# Patient Record
Sex: FEMALE | Race: WHITE
[De-identification: names, ages, dates, MRNs, and addresses within clinical notes are randomized per-mention and may not be internally consistent; named-entity substitution may affect disease eponyms.]

---

## 2017-02-06 ENCOUNTER — HOSPITAL ENCOUNTER (INPATIENT)
Dept: HOSPITAL 75 - ER | Age: 82
LOS: 4 days | Discharge: NURSING, REHAB OR LONG-TERM CARE | DRG: 690 | End: 2017-02-10
Attending: FAMILY MEDICINE | Admitting: FAMILY MEDICINE
Payer: MEDICARE

## 2017-02-06 VITALS — HEIGHT: 63 IN | WEIGHT: 112.03 LBS | BODY MASS INDEX: 19.85 KG/M2

## 2017-02-06 VITALS — SYSTOLIC BLOOD PRESSURE: 153 MMHG | DIASTOLIC BLOOD PRESSURE: 86 MMHG

## 2017-02-06 VITALS — DIASTOLIC BLOOD PRESSURE: 70 MMHG | SYSTOLIC BLOOD PRESSURE: 125 MMHG

## 2017-02-06 DIAGNOSIS — E83.42: ICD-10-CM

## 2017-02-06 DIAGNOSIS — I25.10: ICD-10-CM

## 2017-02-06 DIAGNOSIS — K21.9: ICD-10-CM

## 2017-02-06 DIAGNOSIS — I10: ICD-10-CM

## 2017-02-06 DIAGNOSIS — E03.9: ICD-10-CM

## 2017-02-06 DIAGNOSIS — Z99.81: ICD-10-CM

## 2017-02-06 DIAGNOSIS — N39.0: Primary | ICD-10-CM

## 2017-02-06 DIAGNOSIS — E87.6: ICD-10-CM

## 2017-02-06 DIAGNOSIS — G47.36: ICD-10-CM

## 2017-02-06 DIAGNOSIS — Z66: ICD-10-CM

## 2017-02-06 DIAGNOSIS — K52.9: ICD-10-CM

## 2017-02-06 DIAGNOSIS — M19.91: ICD-10-CM

## 2017-02-06 DIAGNOSIS — E78.00: ICD-10-CM

## 2017-02-06 DIAGNOSIS — R42: ICD-10-CM

## 2017-02-06 DIAGNOSIS — J44.9: ICD-10-CM

## 2017-02-06 DIAGNOSIS — Z95.5: ICD-10-CM

## 2017-02-06 DIAGNOSIS — Z87.891: ICD-10-CM

## 2017-02-06 LAB
ALBUMIN SERPL-MCNC: 3.1 G/DL (ref 3.2–4.5)
ALT SERPL-CCNC: 10 U/L (ref 0–55)
ANION GAP SERPL CALC-SCNC: 12 MMOL/L (ref 5–14)
AST SERPL-CCNC: 19 U/L (ref 5–34)
BASOPHILS # BLD AUTO: 0 10^3/UL (ref 0–0.1)
BASOPHILS NFR BLD AUTO: 0 % (ref 0–10)
BILIRUB SERPL-MCNC: 0.3 MG/DL (ref 0.1–1)
BILIRUB UR QL STRIP: NEGATIVE
BUN SERPL-MCNC: 14 MG/DL (ref 7–18)
BUN/CREAT SERPL: 22
CALCIUM SERPL-MCNC: 6.7 MG/DL (ref 8.5–10.1)
CHLORIDE SERPL-SCNC: 105 MMOL/L (ref 98–107)
CO2 SERPL-SCNC: 20 MMOL/L (ref 21–32)
CREAT SERPL-MCNC: 0.65 MG/DL (ref 0.6–1.3)
EOSINOPHIL # BLD AUTO: 0 10^3/UL (ref 0–0.3)
EOSINOPHIL NFR BLD AUTO: 0 % (ref 0–10)
ERYTHROCYTE [DISTWIDTH] IN BLOOD BY AUTOMATED COUNT: 13.1 % (ref 10–14.5)
GFR SERPLBLD BASED ON 1.73 SQ M-ARVRAT: > 60 ML/MIN
GLUCOSE SERPL-MCNC: 88 MG/DL (ref 70–105)
HYALINE CASTS #/AREA URNS LPF: (no result) /LPF
KETONES UR QL STRIP: (no result)
LEUKOCYTE ESTERASE UR QL STRIP: NEGATIVE
LYMPHOCYTES # BLD AUTO: 0.7 X 10^3 (ref 1–4)
LYMPHOCYTES NFR BLD AUTO: 13 % (ref 12–44)
MAGNESIUM SERPL-MCNC: 1.3 MG/DL (ref 1.8–2.4)
MCH RBC QN AUTO: 33 PG (ref 25–34)
MCHC RBC AUTO-ENTMCNC: 35 G/DL (ref 32–36)
MCV RBC AUTO: 93 FL (ref 80–99)
MONOCYTES # BLD AUTO: 0.9 X 10^3 (ref 0–1)
MONOCYTES NFR BLD AUTO: 16 % (ref 0–12)
NEUTROPHILS # BLD AUTO: 4 X 10^3 (ref 1.8–7.8)
NEUTROPHILS NFR BLD AUTO: 71 % (ref 42–75)
NITRITE UR QL STRIP: NEGATIVE
PH UR STRIP: 6 [PH] (ref 5–9)
PLATELET # BLD: 158 10^3/UL (ref 130–400)
PMV BLD AUTO: 11.7 FL (ref 7.4–10.4)
POTASSIUM SERPL-SCNC: 2.7 MMOL/L (ref 3.6–5)
PROT SERPL-MCNC: 5.4 G/DL (ref 6.4–8.2)
PROT UR QL STRIP: (no result)
RBC # BLD AUTO: 4.43 10^6/UL (ref 4.35–5.85)
SODIUM SERPL-SCNC: 137 MMOL/L (ref 135–145)
SP GR UR STRIP: 1.02 (ref 1.02–1.02)
TSH SERPL-ACNC: 0.03 UIU/ML (ref 0.35–4.94)
UROBILINOGEN UR-MCNC: NORMAL MG/DL
WBC # BLD AUTO: 5.5 10^3/UL (ref 4.3–11)

## 2017-02-06 PROCEDURE — 96375 TX/PRO/DX INJ NEW DRUG ADDON: CPT

## 2017-02-06 PROCEDURE — 84439 ASSAY OF FREE THYROXINE: CPT

## 2017-02-06 PROCEDURE — 87077 CULTURE AEROBIC IDENTIFY: CPT

## 2017-02-06 PROCEDURE — 85007 BL SMEAR W/DIFF WBC COUNT: CPT

## 2017-02-06 PROCEDURE — 87088 URINE BACTERIA CULTURE: CPT

## 2017-02-06 PROCEDURE — 96365 THER/PROPH/DIAG IV INF INIT: CPT

## 2017-02-06 PROCEDURE — 80053 COMPREHEN METABOLIC PANEL: CPT

## 2017-02-06 PROCEDURE — 81000 URINALYSIS NONAUTO W/SCOPE: CPT

## 2017-02-06 PROCEDURE — 36415 COLL VENOUS BLD VENIPUNCTURE: CPT

## 2017-02-06 PROCEDURE — 82607 VITAMIN B-12: CPT

## 2017-02-06 PROCEDURE — 94760 N-INVAS EAR/PLS OXIMETRY 1: CPT

## 2017-02-06 PROCEDURE — 85027 COMPLETE CBC AUTOMATED: CPT

## 2017-02-06 PROCEDURE — 96361 HYDRATE IV INFUSION ADD-ON: CPT

## 2017-02-06 PROCEDURE — 84443 ASSAY THYROID STIM HORMONE: CPT

## 2017-02-06 PROCEDURE — 80048 BASIC METABOLIC PNL TOTAL CA: CPT

## 2017-02-06 PROCEDURE — 83735 ASSAY OF MAGNESIUM: CPT

## 2017-02-06 PROCEDURE — 85025 COMPLETE CBC W/AUTO DIFF WBC: CPT

## 2017-02-06 PROCEDURE — 94640 AIRWAY INHALATION TREATMENT: CPT

## 2017-02-06 PROCEDURE — 93005 ELECTROCARDIOGRAM TRACING: CPT

## 2017-02-06 RX ADMIN — MAGNESIUM SULFATE IN DEXTROSE SCH MLS/HR: 10 INJECTION, SOLUTION INTRAVENOUS at 19:35

## 2017-02-06 RX ADMIN — MAGNESIUM SULFATE IN DEXTROSE SCH MLS/HR: 10 INJECTION, SOLUTION INTRAVENOUS at 21:29

## 2017-02-06 RX ADMIN — SODIUM CHLORIDE AND POTASSIUM CHLORIDE SCH MLS/HR: 9; 2.98 INJECTION, SOLUTION INTRAVENOUS at 19:35

## 2017-02-06 NOTE — ED GENERAL
General


Chief Complaint:  Abdominal/GI Problems


Stated Complaint:  NAUSEA


Nursing Triage Note:  


pt c/o of nausea and dizziness starting yesterday afternoon. reports nothing to 


eat or drink since then.  pt denies diarrhea, voming, or pain.  denies any 


respiratory symptoms.  brought in per ems


Nursing Sepsis Screen:  No Definite Risk


Source of Information:  Patient, EMS, Old Records


Exam Limitations:  No Limitations





History of Present Illness


Time Seen by Provider:  13:26


Initial Comments


This 85-year-old woman presents to emergency room via EMS with complaints of 

intense nausea and dizziness.  Symptoms have been present since yesterday 

afternoon.  EMS administered Zofran 8 mg but she complains of persistent 

nausea.  She has not been vomiting and denies diarrhea.  Her dizziness is 

exacerbated when standing.  Family reports that she had similar symptoms in the 

past during urinary tract infection.  She is oxygen dependent with COPD.  She 

denies abdominal pain.





Allergies and Home Medications


Allergies


Coded Allergies:  


     prednisone (Verified  Allergy, Unknown, 9/18/15)


     codeine (Verified  Adverse Reaction, Mild, N/V, SWEATS, 9/18/15)


Uncoded Allergies:  


     NARCOTICS (Adverse Reaction, Intermediate, 2/1/13)





Home Medications


Acetaminophen 650 Mg Tablet.er 650 MG PO DAILY PRN PRN PAIN (Reported) 


Albuterol Sulfate 8.5 Gm Hfa.aer.ad 2 PUFF IH Q4H PRN PRN SHORTNESS OF BREATH (

Reported) 


Atorvastatin Calcium 10 Mg Tablet 10 MG PO DAILY (Reported) 


Clopidogrel Bisulfate 75 Mg Tablet 75 MG PO DAILY (Reported) 


Enalapril Maleate 10 Mg Tablet 10 MG PO BID (Reported) 


Levothyroxine Sodium 125 Mcg Tablet 125 MG PO DAILY (Reported) 


Meclizine HCl 25 Mg Tablet 25 MG PO DAILY PRN PRN VERTIGO (Reported) 


Metoprolol Tartrate 25 Mg Tablet 25 MG PO BID (Reported) 


Montelukast Sodium 10 Mg Tablet 10 MG PO HS (Reported) 


Pantoprazole Sodium 40 Mg Tablet.dr 40 MG PO DAILY (Reported) 


Tolterodine Tartrate 1 Mg Tablet 1 MG PO BID (Reported) 


Trazodone HCl 50 Mg Tablet 50 MG PO HS PRN PRN SLEEP (Reported) 


Verapamil HCl 240 Mg Tablet.er 240 MG PO DAILY (Reported) 


Vit A/C/E/Zinc/Co 1 Cap Capsule 1 CAP PO BID (Reported) 





Constitutional:   no symptoms reported


EENTM:   no symptoms reported


Respiratory:   no symptoms reported


Cardiovascular:   no symptoms reported


Gastrointestinal:   see HPI


Genitourinary:   no symptoms reported


Musculoskeletal:   no symptoms reported


Skin:   no symptoms reported


Psychiatric/Neurological:   See HPI


Hematologic/Lymphatic:   No Symptoms Reported





Past Medical-Social-Family Hx


Patient Social History


Alcohol Use:  Occasionally Uses


Recreational Drug Use:  No


Smoking Status:  Former Smoker


Former Smoker/When Quit:  Jan 1, 1999


2nd Hand Smoke Exposure:  No


Recent Foreign Travel:  No


Contact w/Someone Who Travel:  No


Recent Infectious Disease Expo:  No


Recent Hopitalizations:  No





Immunizations Up To Date


Tetanus Booster (TDap):  Unknown


Date of Pneumonia Vaccine:  Oct 1, 2009


Date of Influenza Vaccine:  Oct 15, 2016





Seasonal Allergies


Seasonal Allergies:  No





Surgeries


HX Surgeries:  Yes (tumor removed high in the ear, hip)


Surgeries:  Coronary Stent, Eye Surgery, Gallbladder, Orthopedic, Tonsillectomy





Respiratory


Hx Respiratory Disorders:  Yes


Respiratory Disorders:  Pneumonia, COPD, Emphysema





Cardiovascular


Hx Cardiac Disorders:  Yes (CARDIAC STENT X1)


Cardiac Disorders:  Coronary Artery Disease, High Cholesterol, Hypertension, 

Valvular Heart Disease





Neurological


Hx Neurological Disorders:  Yes (chronic dizziness)





Reproductive System


Pregnant:  No


Hx Reproductive Disorders:  No





Genitourinary


Hx Genitourinary Disorders:  Yes


Genitourinary Disorders:  Kidney Infection, UTI-Chronic





Gastrointestinal


Hx Gastrointestinal Disorders:  Yes


Gastrointestinal Disorders:  Colitis, Gastroesophageal Reflux





Musculoskeletal


Hx Musculoskeletal Disorders:  Yes (LEFT HIP FX-2/1/2013, compression fractures)


Musculoskeletal Disorders:  Arthritis, Fractures





Endocrine


Hx Endocrine Disorders:  Yes


Endocrine Disorders:  Hypothyroidsim





HEENT


HX ENT Disorders:  Yes


HEENT Disorders:  Cataract





Cancer


Hx Cancer:  No





Psychosocial


Hx Psychiatric Problems:  No





Integumentary


HX Skin/Integumentary Disorder:  No





Blood Transfusions


Hx Blood Disorders:  No





Family Medical History


Significant Family History:  Cancer, Hypertension


Family Medial History:  


Neoplasm


  G8 SISTER





Physical Exam


Vital Signs





 Vital Sign - Last 12Hours








 2/6/17





 13:39


 


Temp 97.7


 


Pulse 75


 


Resp 18


 


B/P 175/83


 


Pulse Ox 88


 


O2 Delivery Room Air


 


O2 Flow Rate 3


 


FiO2 92





Capillary Refill : Less Than 3 Seconds


General Appearance:   WD/WN Mild Distress


HEENT:   PERRL/EOMI Normal ENT Inspection Pharynx Normal


Respiratory:   Lungs Clear No Accessory Muscle Use No Respiratory Distress 

Decreased Breath Sounds


Cardiovascular:   Regular Rate, Rhythm No Edema No Murmur


Gastrointestinal:   Normal Bowel Sounds Non Tender Soft


Extremity:   Normal Inspection No Pedal Edema


Neurologic/Psychiatric:   Alert Oriented x3 No Motor/Sensory Deficits Normal 

Mood/Affect CNs II-XII Norm as Tested Other (exam normal to the extent tested.  

Patient was too uncomfortable dizziness for extensive exam)


Skin:   Normal Color Warm/Dry





Progress/Results/Core Measures


Results/Orders


Lab Results





 Laboratory Tests








Test


  2/6/17


13:54 2/6/17


15:03 Range/Units


 


 


Alanine Aminotransferase


(ALT/SGPT) 10 


  


  0-55  U/L


 


 


Albumin 3.1 L  3.2-4.5  G/DL


 


Alkaline Phosphatase 64     U/L


 


Anion Gap 12   5-14  MMOL/L


 


Aspartate Amino Transf


(AST/SGOT) 19 


  


  5-34  U/L


 


 


BUN/Creatinine Ratio 22    


 


Basophils # (Auto)


  0.0 


  


  0.0-0.1


10^3/uL


 


Basophils (%) (Auto) 0   0-10  %


 


Blood Urea Nitrogen 14   7-18  MG/DL


 


Calcium Level 6.7 L  8.5-10.1  MG/DL


 


Carbon Dioxide Level 20 L  21-32  MMOL/L


 


Chloride Level 105     MMOL/L


 


Creatinine


  0.65 


  


  0.60-1.30


MG/DL


 


Eosinophils # (Auto)


  0.0 


  


  0.0-0.3


10^3/uL


 


Eosinophils (%) (Auto) 0   0-10  %


 


Estimat Glomerular Filtration


Rate > 60 


  


   


 


 


Free Thyroxine


  1.49 H


  


  0.70-1.48


NG/DL


 


Glucose Level 88     MG/DL


 


Hematocrit 41   35-52  %


 


Hemoglobin 14.4   11.5-16.0  G/DL


 


Lymphocytes # (Auto) 0.7 L  1.0-4.0  X 10^3


 


Lymphocytes (%) (Auto) 13   12-44  %


 


Magnesium Level 1.3 L  1.8-2.4  MG/DL


 


Mean Corpuscular Hemoglobin 33   25-34  PG


 


Mean Corpuscular Hemoglobin


Concent 35 


  


  32-36  G/DL


 


 


Mean Corpuscular Volume 93   80-99  FL


 


Mean Platelet Volume 11.7 H  7.4-10.4  FL


 


Monocytes # (Auto) 0.9   0.0-1.0  X 10^3


 


Monocytes (%) (Auto) 16 H  0-12  %


 


Neutrophils # (Auto) 4.0   1.8-7.8  X 10^3


 


Neutrophils (%) (Auto) 71   42-75  %


 


Platelet Count


  158 


  


  130-400


10^3/uL


 


Potassium Level 2.7 L  3.6-5.0  MMOL/L


 


Red Blood Count


  4.43 


  


  4.35-5.85


10^6/uL


 


Red Cell Distribution Width 13.1   10.0-14.5  %


 


Sodium Level 137   135-145  MMOL/L


 


Thyroid Stimulating Hormone


(TSH) 0.03 L


  


  0.35-4.94


UIU/ML


 


Total Bilirubin 0.3   0.1-1.0  MG/DL


 


Total Protein 5.4 L  6.4-8.2  G/DL


 


White Blood Count


  5.5 


  


  4.3-11.0


10^3/uL


 


Urine Bacteria  FEW H  /HPF


 


Urine Bilirubin  NEGATIVE  NEGATIVE  


 


Urine Casts  PRESENT   /LPF


 


Urine Clarity


  


  SLIGHTLY


CLOUDY  


 


 


Urine Color  YELLOW   


 


Urine Crystals  NONE   /LPF


 


Urine Culture Indicated  YES   


 


Urine Glucose (UA)  NEGATIVE  NEGATIVE  


 


Urine Hyaline Casts  25-50 H  /LPF


 


Urine Ketones  4+ H NEGATIVE  


 


Urine Leukocyte Esterase  NEGATIVE  NEGATIVE  


 


Urine Mucus  MODERATE H  /LPF


 


Urine Nitrite  NEGATIVE  NEGATIVE  


 


Urine Protein  3+ H NEGATIVE  


 


Urine RBC  2-5 H  /HPF


 


Urine RBC (Auto)  3+ H NEGATIVE  


 


Urine Specific Gravity  1.020  1.016-1.022  


 


Urine Squamous Epithelial


Cells 


  10-25 H


   /HPF


 


 


Urine Urobilinogen  NORMAL  NORMAL  MG/DL


 


Urine WBC  10-25 H  /HPF


 


Urine pH  6  5-9  








My Orders





 Orders-MALKA CABELLO MD


Promethazine Injection (Phenergan Injec (2/6/17 14:00)


Cbc With Automated Diff (2/6/17 13:48)


Comprehensive Metabolic Panel (2/6/17 13:48)


Magnesium (2/6/17 13:48)


Ua Culture If Indicated (2/6/17 13:48)


Saline Lock/Iv-Start (2/6/17 13:48)


Potassium Cl 10meq/50ml Ivpb (Kcl 10 Meq (2/6/17 15:00)


Thyroid Stimulating Hormone (2/6/17 15:20)


Free T4 (Free Thyroxine) (2/6/17 15:20)


Ns Iv 500 Ml (Sodium Chloride 0.9%) (2/6/17 15:49)


Urine Culture (2/6/17 15:03)


Ceftriaxone Injection (Rocephin Injectio (2/6/17 16:00)


Ekg Tracing (2/6/17 16:17)


O2 (2/6/17 16:17)





Medications Given in ED





Vital Signs/I&O





 Vital Sign - Last 12Hours








 2/6/17 2/6/17 2/6/17 2/6/17





 17:45 20:14 21:00 21:08


 


Temp  98.9  


 


Pulse  87  80


 


Resp  18  


 


B/P  125/70  


 


Pulse Ox  90 92 


 


O2 Delivery Nasal Cannula Nasal Cannula Nasal Cannula 


 


O2 Flow Rate 2.00 2.00 2.00 


 


    





 2/7/17 2/7/17 2/7/17 2/7/17





 00:00 00:13 00:16 01:00


 


Temp 99.7   


 


Pulse 89   79


 


Resp 24   


 


B/P 155/89   


 


Pulse Ox 93 93 93 


 


O2 Delivery Nasal Cannula   


 


O2 Flow Rate 2.00  3.00 


 


    





 2/7/17   





 03:45   


 


Temp 99.8   


 


Pulse 85   


 


Resp 22   


 


B/P 135/61   


 


Pulse Ox 95   


 


O2 Delivery Nasal Cannula   


 


O2 Flow Rate 2.00   








Blood Pressure Mean:  113


Progress Note #1:  


   Time:  15:18


Progress Note


Severe hypokalemia was noted on review of labs.  A dose of IV potassium has 

been ordered.  This has been ordered without additional fluids so as to avoid a 

delusional effect since patient already received 1 L of IV fluids.  Patient's 

nausea and dizziness are much improved after administration of Phenergan and 

fluids.  Patient will require admission for treatment of electrolyte imbalances.


Progress Note #2:  


   Time:  16:14


Progress Note


UTI was suspected based on UA results.  A gram of Rocephin was administered.





ECG


Initial ECG Impression Date:  Feb 6, 2017


Initial ECG Impression Time:  16:46


Initial ECG Rate:  83


Initial ECG Rhythm:  Normal Sinus


Initial ECG Intervals


Prolonged QT interval


Comment


Sinus rhythm with no ST elevation or depression.  Prolonged QT interval.  No 

axis deviation.





Departure


Communication


Time/Spoke to Admitting Phy:  16:00


Communication


Case reviewed with Dr. Hernández is agreeable to admission with electrolyte 

replacement.  Phenergan and Zofran will be continued for control of nausea and 

dizziness.





Impression


Impression:  


 Primary Impression:  


 Hypokalemia


 Additional Impressions:  


 Hypomagnesemia


 Urinary tract infection


 Qualified Code:  N39.0 - Urinary tract infection, site not specified


 Nausea


 Dizziness


 Thyroid dysfunction


Disposition:  09 ADMITTED AS INPATIENT


Condition:  Improved


Decision to Admit Reason:  Admit from ER (General)


Decision to Admit/Date:  Feb 6, 2017


Time/Decision to Admit Time:  15:30





Departure-Patient Inst.


Referrals:  


MOSES HERNÁNDEZ MD (PCP/Family)


Primary Care Physician








MALKA CABELLO MD Feb 6, 2017 15:20

## 2017-02-07 VITALS — SYSTOLIC BLOOD PRESSURE: 129 MMHG | DIASTOLIC BLOOD PRESSURE: 58 MMHG

## 2017-02-07 VITALS — DIASTOLIC BLOOD PRESSURE: 89 MMHG | SYSTOLIC BLOOD PRESSURE: 155 MMHG

## 2017-02-07 VITALS — SYSTOLIC BLOOD PRESSURE: 135 MMHG | DIASTOLIC BLOOD PRESSURE: 61 MMHG

## 2017-02-07 VITALS — SYSTOLIC BLOOD PRESSURE: 110 MMHG | DIASTOLIC BLOOD PRESSURE: 57 MMHG

## 2017-02-07 VITALS — SYSTOLIC BLOOD PRESSURE: 123 MMHG | DIASTOLIC BLOOD PRESSURE: 87 MMHG

## 2017-02-07 VITALS — DIASTOLIC BLOOD PRESSURE: 68 MMHG | SYSTOLIC BLOOD PRESSURE: 128 MMHG

## 2017-02-07 LAB
ANION GAP SERPL CALC-SCNC: 9 MMOL/L (ref 5–14)
BASOPHILS # BLD AUTO: 0 10^3/UL (ref 0–0.1)
BASOPHILS NFR BLD AUTO: 0 % (ref 0–10)
BASOPHILS NFR BLD MANUAL: 0 %
BUN SERPL-MCNC: 16 MG/DL (ref 7–18)
BUN/CREAT SERPL: 19
CALCIUM SERPL-MCNC: 8.1 MG/DL (ref 8.5–10.1)
CHLORIDE SERPL-SCNC: 103 MMOL/L (ref 98–107)
CO2 SERPL-SCNC: 23 MMOL/L (ref 21–32)
CREAT SERPL-MCNC: 0.84 MG/DL (ref 0.6–1.3)
EOSINOPHIL # BLD AUTO: 0 10^3/UL (ref 0–0.3)
EOSINOPHIL NFR BLD AUTO: 0 % (ref 0–10)
EOSINOPHIL NFR BLD MANUAL: 0 %
ERYTHROCYTE [DISTWIDTH] IN BLOOD BY AUTOMATED COUNT: 13.5 % (ref 10–14.5)
GFR SERPLBLD BASED ON 1.73 SQ M-ARVRAT: > 60 ML/MIN
GLUCOSE SERPL-MCNC: 95 MG/DL (ref 70–105)
LYMPHOCYTES # BLD AUTO: 0.8 X 10^3 (ref 1–4)
LYMPHOCYTES NFR BLD AUTO: 17 % (ref 12–44)
MAGNESIUM SERPL-MCNC: 2.1 MG/DL (ref 1.8–2.4)
MCH RBC QN AUTO: 32 PG (ref 25–34)
MCHC RBC AUTO-ENTMCNC: 34 G/DL (ref 32–36)
MCV RBC AUTO: 95 FL (ref 80–99)
MONOCYTES # BLD AUTO: 0.9 X 10^3 (ref 0–1)
MONOCYTES NFR BLD AUTO: 19 % (ref 0–12)
NEUTROPHILS # BLD AUTO: 2.9 X 10^3 (ref 1.8–7.8)
NEUTROPHILS NFR BLD AUTO: 64 % (ref 42–75)
NEUTS BAND NFR BLD MANUAL: 77 %
NEUTS BAND NFR BLD: 0 %
PLATELET # BLD: 154 10^3/UL (ref 130–400)
PMV BLD AUTO: 11.7 FL (ref 7.4–10.4)
POTASSIUM SERPL-SCNC: 3.7 MMOL/L (ref 3.6–5)
RBC # BLD AUTO: 4.26 10^6/UL (ref 4.35–5.85)
SODIUM SERPL-SCNC: 135 MMOL/L (ref 135–145)
VARIANT LYMPHS NFR BLD MANUAL: 6 %
VARIANT LYMPHS NFR BLD MANUAL: 7 %
WBC # BLD AUTO: 4.5 10^3/UL (ref 4.3–11)

## 2017-02-07 RX ADMIN — IPRATROPIUM BROMIDE AND ALBUTEROL SULFATE SCH ML: .5; 3 SOLUTION RESPIRATORY (INHALATION) at 10:31

## 2017-02-07 RX ADMIN — Medication PRN ML: at 20:20

## 2017-02-07 RX ADMIN — ACETAMINOPHEN PRN MG: 500 TABLET ORAL at 18:20

## 2017-02-07 RX ADMIN — METOPROLOL TARTRATE SCH MG: 25 TABLET, FILM COATED ORAL at 09:45

## 2017-02-07 RX ADMIN — ACETAMINOPHEN PRN MG: 500 TABLET ORAL at 09:44

## 2017-02-07 RX ADMIN — IPRATROPIUM BROMIDE AND ALBUTEROL SULFATE SCH ML: .5; 3 SOLUTION RESPIRATORY (INHALATION) at 19:17

## 2017-02-07 RX ADMIN — ACETAMINOPHEN PRN MG: 500 TABLET ORAL at 17:28

## 2017-02-07 RX ADMIN — PROMETHAZINE HYDROCHLORIDE PRN MG: 25 INJECTION INTRAMUSCULAR; INTRAVENOUS at 20:19

## 2017-02-07 RX ADMIN — CLOPIDOGREL BISULFATE SCH MG: 75 TABLET, FILM COATED ORAL at 09:47

## 2017-02-07 RX ADMIN — IPRATROPIUM BROMIDE AND ALBUTEROL SULFATE SCH ML: .5; 3 SOLUTION RESPIRATORY (INHALATION) at 14:03

## 2017-02-07 RX ADMIN — SODIUM CHLORIDE AND POTASSIUM CHLORIDE SCH MLS/HR: 9; 2.98 INJECTION, SOLUTION INTRAVENOUS at 08:05

## 2017-02-07 RX ADMIN — METOPROLOL TARTRATE SCH MG: 25 TABLET, FILM COATED ORAL at 20:20

## 2017-02-07 RX ADMIN — ENALAPRIL MALEATE SCH MG: 10 TABLET ORAL at 20:20

## 2017-02-07 RX ADMIN — ENALAPRIL MALEATE SCH MG: 10 TABLET ORAL at 09:44

## 2017-02-07 RX ADMIN — ONDANSETRON PRN MG: 2 INJECTION, SOLUTION INTRAMUSCULAR; INTRAVENOUS at 16:40

## 2017-02-07 RX ADMIN — IPRATROPIUM BROMIDE AND ALBUTEROL SULFATE SCH ML: .5; 3 SOLUTION RESPIRATORY (INHALATION) at 07:36

## 2017-02-07 RX ADMIN — ONDANSETRON PRN MG: 2 INJECTION, SOLUTION INTRAMUSCULAR; INTRAVENOUS at 09:45

## 2017-02-07 RX ADMIN — PANTOPRAZOLE SODIUM SCH MG: 40 TABLET, DELAYED RELEASE ORAL at 09:44

## 2017-02-07 RX ADMIN — ATORVASTATIN CALCIUM SCH MG: 10 TABLET, FILM COATED ORAL at 09:44

## 2017-02-07 RX ADMIN — TOLTERODINE TARTRATE SCH MG: 2 CAPSULE, EXTENDED RELEASE ORAL at 09:54

## 2017-02-07 RX ADMIN — MONTELUKAST SCH MG: 10 TABLET, FILM COATED ORAL at 20:20

## 2017-02-07 RX ADMIN — SODIUM CHLORIDE AND POTASSIUM CHLORIDE SCH MLS/HR: 9; 2.98 INJECTION, SOLUTION INTRAVENOUS at 04:15

## 2017-02-07 RX ADMIN — SODIUM CHLORIDE AND POTASSIUM CHLORIDE SCH MLS/HR: 9; 2.98 INJECTION, SOLUTION INTRAVENOUS at 19:15

## 2017-02-07 RX ADMIN — SODIUM CHLORIDE SCH MLS/HR: 900 INJECTION INTRAVENOUS at 08:04

## 2017-02-07 RX ADMIN — VERAPAMIL HYDROCHLORIDE SCH MG: 240 TABLET, FILM COATED, EXTENDED RELEASE ORAL at 09:54

## 2017-02-07 NOTE — PHYSICAL THERAPY EVALUATION
PT Evaluation-General


Medical Diagnosis


Admission Date


2017 at 16:31


Medical Diagnosis:  UTI/hypokalemia


Onset Date:  2017





Therapy Diagnosis


Therapy Diagnosis:  debility/weakness





Height/Weight


Height (Feet):  5


Height (Inches):  3.00


Weight (Pounds):  112


Weight (Ounces):  0.5





Precautions


Precautions/Isolations:  Standard Precautions





Referral


Physician:  Edgar


Reason for Referral:  Evaluation/Treatment





Medical History


Pertinent Medical History:  Arthritis, CAD, COPD, GERD, HTN, Hypothroidism


Additional Medical History


O2 dependent


Current History


ER via family with c/o nausea and dizziness


Reviewed History:  Yes





Social History


Home:  Apartment


Current Living Status:  Alone


Entry Into Home:  Level Entry





Prior/Core FIM


Prior Level of Function


              Functional [place] Measure


0=Not Assessed/NA   4=Minimal Assistance


1=Total Assistance   5=Supervision or Setup


2=Maximal Assistance   6=Modified [place]


3=Moderate Assistance   7=Complete [place]


Bed Mobility:  6


Transfers (B,C,W/C) (FIM):  6


Gait:  6


FWW at home





PT Evaluation-Current


Subjective


Patient initially declined PT, however, after much encouragement, patient 

agreed.





Pain





   Numeric Pain Scale:  0-No Pain


   Location:  No Pain Reported





Objective


Patient Orientation:  Normal For Age


Problem Solving:  Fair


Attachments:  Oxygen, IV





ROM/Strength


ROM Lower Extremities


bilateral LE WFL


Strenght Lower Extremities


bilateral LE WFL





Integumentary/Posture


Integumentary


refer to nursing notes


Bowel Incontinence:  No


Bladder Incontinence:  No


Posture


WNL





Neuromuscular


(Tone, Coordination, Reflexes)


grossly intact





Sensory


Hearing:  Functional


Sensation Right Lower Extremit:  Intact


Sensation Left Lower Extremity:  Intact





Transfers


              Functional [place] Measure


0=Not Assessed/NA   4=Minimal Assistance


1=Total Assistance   5=Supervision or Setup


2=Maximal Assistance   6=Modified [place]


3=Moderate Assistance   7=Complete [place]


Transfers (B, C, W/C) (FIM):  5


Scootin


Rollin


Supine to/from Sit:  5





Gait


Mode of Locomotion:  Walk


Anticipated Mode of Locomotion:  Walk


Gait (FIM):  1


Distance (FIM):  1=up to 49 ft


Distance:  5'


Gait Level of Assist:  5


Gait Persons Needed:  1


Gait Assistive Device:  FWW


Comments/Gait Description


functional; patient declined to ambulate farther distance at this time





Balance


Sitting Static:  Normal


Sitting Dynamic:  Normal


Standing Static:  Normal


 Standing Dynamic:  Normal





Assessment/Needs


85 y.o. female, will benefit from short term skilled PT to address functional 

mobility to improve performance and to return safely to home at [facility].


Rehab Potential:  Good





PT Long Term Goals


Long Term Goals


PT Long Term Goals Time Frame:  2017


Transfers (B,C,W/C) (FIM):  6


Gait (FIM):  6


Gait distance (FIM):  3=150 ft


Distance:  150'


Gait Level of Assist:  6


Gait Assistive Device:  FWW





PT Plan


Problem List


Problem List:  Activity Tolerance, Functional Strength





Treatment/Plan


Treatment Plan:  Continue Plan of Care


Treatment Plan:  Education, Functional Activity Atif, Functional Strength, Gait

, Safety, Therapeutic Exercise, Transfers


Treatment Duration:  2017


# of days/week


5-6


Visits Per Week:  5-6


Pt/Family Agrees w/Plan:  Yes





Safety Risks/Education


Patient Education:  Disease Process, Safety Issues


Teaching Recipient:  Patient


Teaching Methods:  Discussion


Response to Teaching:  Verbalize Understanding





Time/GCodes


Time In:  1455


Time Out:  1510


Total Billed Treatment Time:  15


Total Billed Treatment


1 visit


EVLowC 15 min








KAVITA PERDOMO PT 2017 15:19

## 2017-02-07 NOTE — HISTORY & PHYSICIAL
History of Present Illness


History of Present Illness


Reason for visit/HPI


PT IS AN 86 Y/O FEMALE WHO IS NEW TO ME, PER EMERGENCY DEPT PHYSICIAN, PT 

PLANNING TO TRANSFER TO MY CARE, HAS NOT YET ESTABLISHED WITH ME AS A PATIENT.


THE PATIENT REPORTEDLY HAD SIGNIFICANT DIZZINESS, SOME NAUSEA WITHOUT EMESIS, 

AND CONFUSION.


PT WAS BROUGHT TO HOSPITAL BY FAMILY, FOUND TO HAVE PROFOUND HYPOKALEMIA AND 

URINARY TRACT INFECTION.  PT WAS ADMITTED FROM THE EMERGENCY DEPT FOR IV FLUIDS 

AND IV ANTIBIOTICS.


Date of Admission


2017 at 16:31


I consulted on this patient on


17


 08:12


Attending Physician


Moses Hernández MD


Admitting Physician


Moses Hernández MD


Consult








Allergies and Home Medications


Allergies


Coded Allergies:  


     prednisone (Verified  Allergy, Unknown, 9/18/15)


     codeine (Verified  Adverse Reaction, Mild, N/V, SWEATS, 9/18/15)


Uncoded Allergies:  


     NARCOTICS (Adverse Reaction, Intermediate, 13)





Home Medications


Acetaminophen 650 Mg Tablet.er 650 MG PO DAILY PRN PRN PAIN (Reported) 


Albuterol Sulfate 8.5 Gm Hfa.aer.ad 2 PUFF IH Q4H PRN PRN SHORTNESS OF BREATH (

Reported) 


Atorvastatin Calcium 10 Mg Tablet 10 MG PO DAILY (Reported) 


Clopidogrel Bisulfate 75 Mg Tablet 75 MG PO DAILY (Reported) 


Enalapril Maleate 10 Mg Tablet 10 MG PO BID (Reported) 


Levothyroxine Sodium 125 Mcg Tablet 125 MG PO DAILY (Reported) 


Meclizine HCl 25 Mg Tablet 25 MG PO DAILY PRN PRN VERTIGO (Reported) 


Metoprolol Tartrate 25 Mg Tablet 25 MG PO BID (Reported) 


Montelukast Sodium 10 Mg Tablet 10 MG PO HS (Reported) 


Pantoprazole Sodium 40 Mg Tablet.dr 40 MG PO DAILY (Reported) 


Tolterodine Tartrate 1 Mg Tablet 1 MG PO BID (Reported) 


Trazodone HCl 50 Mg Tablet 50 MG PO HS PRN PRN SLEEP (Reported) 


Verapamil HCl 240 Mg Tablet.er 240 MG PO DAILY (Reported) 


Vit A/C/E/Zinc/Co 1 Cap Capsule 1 CAP PO BID (Reported) 





Past Medical-Social-Family Hx


Patient Social History


Living Status:  LIVES AT HOME ALONE


Employed/Student:  retired


Alcohol Use:  Occasionally Uses


Recreational Drug Use:  No


Smoking Status:  Former Smoker


Former smoker/When Quit:  1999


2nd Hand Smoke Exposure:  No


Physical Abuse Screen:  No


Sexual Abuse:  No


Recent Foreign Travel:  No


Contact w/other who traveled:  No


Recent Hopitalizations:  No


Recent Infectious Disease Expo:  No





Immunizations Up To Date


Tetanus Booster (TDap):  Unknown


Date of Pneumonia Vaccine:  Oct 1, 2009


Date of Influenza Vaccine:  Oct 15, 2016





Seasonal Allergies


Seasonal Allergies:  No





Surgeries


HX Surgeries:  Yes (tumor removed high in the ear, hip)


Surgeries:  Coronary Stent, Eye Surgery, Gallbladder, Orthopedic, Tonsillectomy





Respiratory


Hx Respiratory Disorders:  Yes





Cardiovascular


Hx Cardiovascular Disorders:  Yes (CARDIAC STENT X1)


Cardiac Disorders:  Coronary Artery Disease, High Cholesterol, Hypertension, 

Valvular Heart Disease





Neurological


Hx Neurological Disorders:  Yes (chronic dizziness)





Reproductive System


Pregnant:  No


Hx Reproductive Disorders:  No


Sexually Transmitted Disease:  No


HIV/AIDS:  No


Female Reproductive Disorders:  Denies





Genitourinary


Hx Genitourinary Disorders:  Yes


Genitourinary Disorders:  Kidney Infection, UTI-Chronic





Gastrointestinal


Hx Gastrointestinal Disorders:  Yes


Gastrointestinal Disorders:  Colitis, Gastroesophageal Reflux





Musculoskeletal


Hx Musculoskeletal Disorders:  Yes (LEFT HIP FX-2013, compression fractures)


Musculoskeletal Disorders:  Arthritis, Fractures





Endocrine


Hx Endocrine Disorders:  Yes


Endocrine Disorders:  Hypothyroidsim





HEENT


HX ENT Disorders:  Yes


HEENT Disorders:  Cataract





Cancer


Hx Cancer:  No





Psychosocial


Hx Psychiatric Problems:  No





Integumentary


HX Skin/Integumentary Disorder:  No





Blood Transfusions


Hx Blood Disorders:  No





Reviewed Nursing Assessment


Reviewed/Agree w Nursing PMH:  Yes





Family Medical History


Significant Family History:  Cancer, Hypertension


Family Hx:  


Neoplasm


  G8 SISTER





Constitutional:   fever malaise weaknessNo weight loss


EENTM:  No hoarseness, No mouth pain, No throat pain


Respiratory:   short of breath


Cardiovascular:  No chest pain, No edema


Gastrointestinal:   diarrhea nausea


Genitourinary:   frequency


Musculoskeletal:   back pain muscle weakness


Skin:  No lesions, No rash


Psychiatric/Neurological:   AnxietyDenies Depressed


All Other Systems Reviewed


Negative Unless Noted:  Yes





Physical Exam


Vital Signs





 Vital Sign - Last 12Hours








 17





 13:39


 


Temp 97.7


 


Pulse 75


 


Resp 18


 


B/P 175/83


 


Pulse Ox 88


 


O2 Delivery Room Air


 


O2 Flow Rate 3


 


FiO2 92





Capillary Refill : Less Than 3 Seconds


General Appearance:   WD/WN Mild Distress


Eyes:  Bilateral Eye EOMI, Bilateral Eye Normal Inspection, Bilateral Eye PERRL


HEENT:   PERRL/EOMI Pharynx Normal


Neck:   Full Range of Motion Supple


Respiratory:   Chest Non Tender Decreased Breath Sounds


Cardiovascular:   Regular Rate, Rhythm


Gastrointestinal:   Non Tender Soft Abnormal Bowel Sounds (INCREASED)No 

Distended


Rectal:   Deferred


Back:   Normal Inspection


Extremity:   Normal Capillary Refill No Pedal Edema


Neurologic/Psychiatric:   Alert Oriented x3 Other (FLAT AFFECT)


Skin:   Normal Color Warm/Dry


Lymphatic:   No Adenopathy





Assessment/Plan


Assessment and Plan


ABDOMINAL PAIN


DIARRHEA


NAUSEA


UTI


HYPOKALEMIA


HYPOMAGNESEMIA


WEAKNESS





ABDOMINAL PAIN - DIARRHEA - NAUSEA - REPLENISH WITH IV FLUIDS - MONITOR SYMPTOMS

, SUSPECT PT HAS CURRENT GI ILLNESS CAUSING GASTROENTERITIS, WILL MONITOR 

SYMPTOMS.





UTI - MONITOR CULTURE AND SENSITIVITY





HYPOKALEMIA AND HYPOMAGNESEMIA - REPLENISH WITH IV FLUIDS AND IV ELECTROLYTES





WEAKNESS - START THERAPY 


PT MAY NEED NURSING HOME FOR REHAB





Admission Diagnosis


ABDOMINAL PAIN


DIARRHEA


UTI


NAUSEA


HYPOKALEMIA


HYPOMAGNESEMIA


WEAKNESS





Clinical Quality Measures


DVT/VTE Risk/Contraindication:


Risk Factor Score Per Nursin


RFS Level Per Nursing on Admit:  2=Moderate








MOSES HERNÁNDEZ MD 2017 08:16

## 2017-02-08 VITALS — SYSTOLIC BLOOD PRESSURE: 90 MMHG | DIASTOLIC BLOOD PRESSURE: 57 MMHG

## 2017-02-08 VITALS — DIASTOLIC BLOOD PRESSURE: 76 MMHG | SYSTOLIC BLOOD PRESSURE: 153 MMHG

## 2017-02-08 VITALS — DIASTOLIC BLOOD PRESSURE: 56 MMHG | SYSTOLIC BLOOD PRESSURE: 124 MMHG

## 2017-02-08 VITALS — SYSTOLIC BLOOD PRESSURE: 137 MMHG | DIASTOLIC BLOOD PRESSURE: 83 MMHG

## 2017-02-08 VITALS — SYSTOLIC BLOOD PRESSURE: 95 MMHG | DIASTOLIC BLOOD PRESSURE: 51 MMHG

## 2017-02-08 RX ADMIN — Medication SCH EA: at 06:13

## 2017-02-08 RX ADMIN — ENALAPRIL MALEATE SCH MG: 10 TABLET ORAL at 08:46

## 2017-02-08 RX ADMIN — IPRATROPIUM BROMIDE AND ALBUTEROL SULFATE SCH ML: .5; 3 SOLUTION RESPIRATORY (INHALATION) at 11:00

## 2017-02-08 RX ADMIN — PROMETHAZINE HYDROCHLORIDE PRN MG: 25 INJECTION INTRAMUSCULAR; INTRAVENOUS at 20:15

## 2017-02-08 RX ADMIN — SODIUM CHLORIDE AND POTASSIUM CHLORIDE SCH MLS/HR: 9; 2.98 INJECTION, SOLUTION INTRAVENOUS at 18:48

## 2017-02-08 RX ADMIN — METOPROLOL TARTRATE SCH MG: 25 TABLET, FILM COATED ORAL at 20:15

## 2017-02-08 RX ADMIN — METRONIDAZOLE SCH MLS/HR: 5 INJECTION, SOLUTION INTRAVENOUS at 22:07

## 2017-02-08 RX ADMIN — SODIUM CHLORIDE AND POTASSIUM CHLORIDE SCH MLS/HR: 9; 2.98 INJECTION, SOLUTION INTRAVENOUS at 07:47

## 2017-02-08 RX ADMIN — VERAPAMIL HYDROCHLORIDE SCH MG: 240 TABLET, FILM COATED, EXTENDED RELEASE ORAL at 08:45

## 2017-02-08 RX ADMIN — SODIUM CHLORIDE SCH MLS/HR: 900 INJECTION INTRAVENOUS at 08:45

## 2017-02-08 RX ADMIN — ACETAMINOPHEN PRN MG: 500 TABLET ORAL at 13:33

## 2017-02-08 RX ADMIN — ATORVASTATIN CALCIUM SCH MG: 10 TABLET, FILM COATED ORAL at 08:45

## 2017-02-08 RX ADMIN — PANTOPRAZOLE SODIUM SCH MG: 40 TABLET, DELAYED RELEASE ORAL at 08:46

## 2017-02-08 RX ADMIN — IPRATROPIUM BROMIDE AND ALBUTEROL SULFATE SCH ML: .5; 3 SOLUTION RESPIRATORY (INHALATION) at 07:22

## 2017-02-08 RX ADMIN — ENOXAPARIN SODIUM SCH MG: 100 INJECTION SUBCUTANEOUS at 14:17

## 2017-02-08 RX ADMIN — IPRATROPIUM BROMIDE AND ALBUTEROL SULFATE SCH ML: .5; 3 SOLUTION RESPIRATORY (INHALATION) at 19:43

## 2017-02-08 RX ADMIN — ENALAPRIL MALEATE SCH MG: 10 TABLET ORAL at 20:15

## 2017-02-08 RX ADMIN — MONTELUKAST SCH MG: 10 TABLET, FILM COATED ORAL at 20:15

## 2017-02-08 RX ADMIN — METOPROLOL TARTRATE SCH MG: 25 TABLET, FILM COATED ORAL at 08:46

## 2017-02-08 RX ADMIN — CLOPIDOGREL BISULFATE SCH MG: 75 TABLET, FILM COATED ORAL at 08:46

## 2017-02-08 RX ADMIN — IPRATROPIUM BROMIDE AND ALBUTEROL SULFATE SCH ML: .5; 3 SOLUTION RESPIRATORY (INHALATION) at 14:36

## 2017-02-08 RX ADMIN — METRONIDAZOLE SCH MLS/HR: 5 INJECTION, SOLUTION INTRAVENOUS at 14:17

## 2017-02-08 RX ADMIN — LACTOBACILLUS TAB SCH TAB.CHEW: TAB at 16:03

## 2017-02-08 RX ADMIN — TOLTERODINE TARTRATE SCH MG: 2 CAPSULE, EXTENDED RELEASE ORAL at 08:58

## 2017-02-08 NOTE — PROGRESS NOTE (SOAP)
Subjective


Subjective/Events-last exam


PT REPORTS THAT SHE IS VERY FATIGUED THIS MORNING DUE TO DIARRHEA MULTIPLE 

TIMES LAST NIGHT.


HER ABDOMEN IS UNCOMFORTABLE TODAY.


Review of Systems


General:   Fatigue Malaise


HEENT:  No Head Aches


Pulmonary:  No Dyspnea, No Cough


Cardiovascular:  No: Chest Pain


Gastrointestinal:  : Abdominal Pain: DiarrheaNo: Nausea


Neurological:  : WeaknessNo: Confusion





Objective


Exam





 Vital Signs








  Date Time  Temp Pulse Resp B/P Pulse Ox O2 Delivery O2 Flow Rate FiO2


 


17 07:22     98  3.00 


 


17 04:00 100.0 79 22 153/76 95 Nasal Cannula 3.00 


 


17 01:00  66      


 


17 21:15      Nasal Cannula 3.00 


 


17 20:30 99.2 87 18 123/87 93 Nasal Cannula 3.00 


 


17 19:18     95  3.00 


 


17 19:00  73      


 


17 19:00 98.8       


 


17 18:20 100.2       


 


17 17:28 100.2       


 


17 17:15 100.2 66 18 129/58 92 Nasal Cannula 3.00 


 


17 16:00 100.2 66 18 129/58  Nasal Cannula 3.00 


 


17 14:05     93  3.00 


 


17 13:00  57      


 


17 12:00 99.6 58 18 110/57 95 Nasal Cannula  














 I & O 


 


 17





 07:00


 


Intake Total 3330 ml


 


Output Total 200 ml


 


Balance 3130 ml





Capillary Refill : Less Than 3 Seconds


General Appearance:   WD/WN


HEENT:   PERRL/EOMI


Neck:   Full Range of Motion Supple


Respiratory:   Chest Non Tender Lungs Clear Normal Breath Sounds


Cardiovascular:   Regular Rate, Rhythm


Gastrointestinal:   abnormal bowel sounds tenderness


Neurologic/Psychiatric:   Alert Oriented x3 No Motor/Sensory Deficits Normal 

Mood/Affect


Skin:   Warm/Dry


Lymphatic:   No Adenopathy





Results


Lab





 Microbiology


17 Urine Culture - Final, Complete





Assessment/Plan


Assessment/Plan


Assess & Plan/Chief Complaint


ABDOMINAL PAIN


DIARRHEA


NAUSEA


UTI


HYPOKALEMIA


HYPOMAGNESEMIA


WEAKNESS





ABDOMINAL PAIN - DIARRHEA - NAUSEA - REPLENISH WITH IV FLUIDS - MONITOR SYMPTOMS

, SUSPECT PT HAS CURRENT GI ILLNESS CAUSING GASTROENTERITIS, WILL MONITOR 

SYMPTOMS.





UTI - MONITOR CULTURE AND SENSITIVITY





HYPOKALEMIA AND HYPOMAGNESEMIA - REPLENISH WITH IV FLUIDS AND IV ELECTROLYTES





WEAKNESS - START THERAPY 


PT MAY NEED NURSING HOME FOR REHAB


Diagnosis/Problems:  





Clinical Quality Measures


DVT/VTE Risk/Contraindication:


Risk Factor Score Per Nursin


RFS Level Per Nursing on Admit:  2=Moderate








MOSES CHAIREZ MD 2017 11:38

## 2017-02-08 NOTE — PHYSICAL THERAPY PROGRESS NOTE
Therapy Progress Note


Pt declined tx due to feeling very tired and fatigued and asked to be seen 

tomorrow.





1 visit, no tx








JUSTINA SALDIVAR PTA Feb 8, 2017 15:57

## 2017-02-09 VITALS — DIASTOLIC BLOOD PRESSURE: 73 MMHG | SYSTOLIC BLOOD PRESSURE: 155 MMHG

## 2017-02-09 VITALS — SYSTOLIC BLOOD PRESSURE: 134 MMHG | DIASTOLIC BLOOD PRESSURE: 62 MMHG

## 2017-02-09 VITALS — SYSTOLIC BLOOD PRESSURE: 105 MMHG | DIASTOLIC BLOOD PRESSURE: 82 MMHG

## 2017-02-09 VITALS — DIASTOLIC BLOOD PRESSURE: 72 MMHG | SYSTOLIC BLOOD PRESSURE: 150 MMHG

## 2017-02-09 VITALS — DIASTOLIC BLOOD PRESSURE: 62 MMHG | SYSTOLIC BLOOD PRESSURE: 127 MMHG

## 2017-02-09 VITALS — DIASTOLIC BLOOD PRESSURE: 67 MMHG | SYSTOLIC BLOOD PRESSURE: 155 MMHG

## 2017-02-09 LAB
ALBUMIN SERPL-MCNC: 3.3 G/DL (ref 3.2–4.5)
ALT SERPL-CCNC: 11 U/L (ref 0–55)
ANION GAP SERPL CALC-SCNC: 5 MMOL/L (ref 5–14)
AST SERPL-CCNC: 19 U/L (ref 5–34)
BILIRUB SERPL-MCNC: 0.3 MG/DL (ref 0.1–1)
BUN SERPL-MCNC: 4 MG/DL (ref 7–18)
BUN/CREAT SERPL: 6
CALCIUM SERPL-MCNC: 8.1 MG/DL (ref 8.5–10.1)
CHLORIDE SERPL-SCNC: 110 MMOL/L (ref 98–107)
CO2 SERPL-SCNC: 24 MMOL/L (ref 21–32)
CREAT SERPL-MCNC: 0.68 MG/DL (ref 0.6–1.3)
ERYTHROCYTE [DISTWIDTH] IN BLOOD BY AUTOMATED COUNT: 13.9 % (ref 10–14.5)
GFR SERPLBLD BASED ON 1.73 SQ M-ARVRAT: > 60 ML/MIN
GLUCOSE SERPL-MCNC: 86 MG/DL (ref 70–105)
MCH RBC QN AUTO: 32 PG (ref 25–34)
MCHC RBC AUTO-ENTMCNC: 32 G/DL (ref 32–36)
MCV RBC AUTO: 100 FL (ref 80–99)
PLATELET # BLD: 118 10^3/UL (ref 130–400)
PMV BLD AUTO: 11.4 FL (ref 7.4–10.4)
POTASSIUM SERPL-SCNC: 5 MMOL/L (ref 3.6–5)
PROT SERPL-MCNC: 5.3 G/DL (ref 6.4–8.2)
RBC # BLD AUTO: 3.73 10^6/UL (ref 4.35–5.85)
SODIUM SERPL-SCNC: 139 MMOL/L (ref 135–145)
WBC # BLD AUTO: 3.1 10^3/UL (ref 4.3–11)

## 2017-02-09 RX ADMIN — LACTOBACILLUS TAB SCH TAB.CHEW: TAB at 15:50

## 2017-02-09 RX ADMIN — ENOXAPARIN SODIUM SCH MG: 100 INJECTION SUBCUTANEOUS at 11:18

## 2017-02-09 RX ADMIN — METRONIDAZOLE SCH MLS/HR: 5 INJECTION, SOLUTION INTRAVENOUS at 22:21

## 2017-02-09 RX ADMIN — PANTOPRAZOLE SODIUM SCH MG: 40 TABLET, DELAYED RELEASE ORAL at 08:41

## 2017-02-09 RX ADMIN — SODIUM CHLORIDE AND POTASSIUM CHLORIDE SCH MLS/HR: 9; 2.98 INJECTION, SOLUTION INTRAVENOUS at 08:01

## 2017-02-09 RX ADMIN — METRONIDAZOLE SCH MLS/HR: 5 INJECTION, SOLUTION INTRAVENOUS at 13:40

## 2017-02-09 RX ADMIN — IPRATROPIUM BROMIDE AND ALBUTEROL SULFATE SCH ML: .5; 3 SOLUTION RESPIRATORY (INHALATION) at 14:39

## 2017-02-09 RX ADMIN — IPRATROPIUM BROMIDE AND ALBUTEROL SULFATE SCH ML: .5; 3 SOLUTION RESPIRATORY (INHALATION) at 10:48

## 2017-02-09 RX ADMIN — SODIUM CHLORIDE AND POTASSIUM CHLORIDE SCH MLS/HR: 9; 2.98 INJECTION, SOLUTION INTRAVENOUS at 06:15

## 2017-02-09 RX ADMIN — SODIUM CHLORIDE AND POTASSIUM CHLORIDE SCH MLS/HR: 9; 2.98 INJECTION, SOLUTION INTRAVENOUS at 19:18

## 2017-02-09 RX ADMIN — ATORVASTATIN CALCIUM SCH MG: 10 TABLET, FILM COATED ORAL at 08:41

## 2017-02-09 RX ADMIN — LACTOBACILLUS TAB SCH TAB.CHEW: TAB at 05:37

## 2017-02-09 RX ADMIN — MONTELUKAST SCH MG: 10 TABLET, FILM COATED ORAL at 22:21

## 2017-02-09 RX ADMIN — Medication SCH EA: at 06:19

## 2017-02-09 RX ADMIN — TOLTERODINE TARTRATE SCH MG: 2 CAPSULE, EXTENDED RELEASE ORAL at 08:41

## 2017-02-09 RX ADMIN — METOPROLOL TARTRATE SCH MG: 25 TABLET, FILM COATED ORAL at 08:41

## 2017-02-09 RX ADMIN — METOPROLOL TARTRATE SCH MG: 25 TABLET, FILM COATED ORAL at 22:21

## 2017-02-09 RX ADMIN — LACTOBACILLUS TAB SCH TAB.CHEW: TAB at 11:18

## 2017-02-09 RX ADMIN — IPRATROPIUM BROMIDE AND ALBUTEROL SULFATE SCH ML: .5; 3 SOLUTION RESPIRATORY (INHALATION) at 08:14

## 2017-02-09 RX ADMIN — ENALAPRIL MALEATE SCH MG: 10 TABLET ORAL at 22:21

## 2017-02-09 RX ADMIN — ONDANSETRON PRN MG: 2 INJECTION, SOLUTION INTRAMUSCULAR; INTRAVENOUS at 05:38

## 2017-02-09 RX ADMIN — CLOPIDOGREL BISULFATE SCH MG: 75 TABLET, FILM COATED ORAL at 08:41

## 2017-02-09 RX ADMIN — METRONIDAZOLE SCH MLS/HR: 5 INJECTION, SOLUTION INTRAVENOUS at 05:38

## 2017-02-09 RX ADMIN — ENALAPRIL MALEATE SCH MG: 10 TABLET ORAL at 08:41

## 2017-02-09 RX ADMIN — VERAPAMIL HYDROCHLORIDE SCH MG: 240 TABLET, FILM COATED, EXTENDED RELEASE ORAL at 08:41

## 2017-02-09 RX ADMIN — IPRATROPIUM BROMIDE AND ALBUTEROL SULFATE SCH ML: .5; 3 SOLUTION RESPIRATORY (INHALATION) at 18:56

## 2017-02-09 NOTE — PROGRESS NOTE (SOAP)
Subjective


Subjective/Events-last exam


pt feeling better per her report - still fatigued, weak, and intermittently 

short of breath.


Review of Systems


General:  No Chills,  Fatigue


HEENT:  No Head Aches


Pulmonary:   Dyspnea Cough


Cardiovascular:  No: Chest Pain, Edema


Gastrointestinal:  No: Abdominal Pain, Nausea


Genitourinary:  No Dysuria


Musculoskeletal:  : back pain


Neurological:  : Confusion (intermittent): Weakness





Objective


Exam





 Vital Signs








  Date Time  Temp Pulse Resp B/P Pulse Ox O2 Delivery O2 Flow Rate FiO2


 


17 08:14     95  3.00 


 


17 07:00  77      


 


17 04:00 97.3 84 19 105/82 94 Nasal Cannula 4.00 


 


17 01:00  62      


 


17 00:00 97.5 82 19 127/62 92 Nasal Cannula 4.00 


 


17 21:00      Nasal Cannula 3.00 


 


17 20:00 98.2 72 18 124/56 92 Nasal Cannula 4.00 


 


17 19:43     90  3.00 


 


17 19:00  68      


 


17 16:00 100.7 66 18 90/57 90 Nasal Cannula 4.00 


 


17 14:36     90  3.00 


 


17 14:30 100.8       


 


17 13:33 99.8       


 


17 12:00 101.4 68 20 95/51 90 Nasal Cannula 3.00 


 


17 11:01     92  3.00 


 


17 09:10      Nasal Cannula 3.00 














 I & O 


 


 17





 07:00


 


Intake Total 2310 ml


 


Output Total 950 ml


 


Balance 1360 ml





Capillary Refill : Less Than 3 Seconds


General Appearance:   No Apparent Distress WD/WN


HEENT:   PERRL/EOMI Pharynx Normal


Neck:   Full Range of Motion Supple


Respiratory:   Chest Non Tender Decreased Breath Sounds


Cardiovascular:   Regular Rate, Rhythm


Gastrointestinal:   normal bowel sounds non tender soft no organomegaly no 

pulsatile mass


Extremity:   Normal Capillary Refill No Pedal Edema


Neurologic/Psychiatric:   Alert Oriented x3 Normal Mood/Affect


Skin:   Warm/Dry


Lymphatic:   No Adenopathy





Results


Lab


 Laboratory Tests


17 07:12: 


Alanine Aminotransferase (ALT/SGPT) 11, Albumin 3.3, Alkaline Phosphatase 58, 

Anion Gap 5, Aspartate Amino Transf (AST/SGOT) 19, BUN/Creatinine Ratio 6, 

Blood Urea Nitrogen 4L, Calcium Level 8.1L, Carbon Dioxide Level 24, Chloride 

Level 110H, Creatinine 0.68, Estimat Glomerular Filtration Rate > 60, Glucose 

Level 86, Hematocrit 37, Hemoglobin 12.0, Mean Corpuscular Hemoglobin 32, Mean 

Corpuscular Hemoglobin Concent 32, Mean Corpuscular Volume 100H, Mean Platelet 

Volume 11.4H, Platelet Count 118L, Potassium Level 5.0, Red Blood Count 3.73L, 

Red Cell Distribution Width 13.9, Sodium Level 139, Total Bilirubin 0.3, Total 

Protein 5.3L, White Blood Count 3.1L





 Microbiology


17 Urine Culture - Final, Complete





Assessment/Plan


Assessment/Plan


Assess & Plan/Chief Complaint


ABDOMINAL PAIN


DIARRHEA


NAUSEA


UTI


HYPOKALEMIA


HYPOMAGNESEMIA


WEAKNESS





ABDOMINAL PAIN - DIARRHEA - NAUSEA - REPLENISH WITH IV FLUIDS - MONITOR SYMPTOMS

, SUSPECT PT HAS CURRENT GI ILLNESS CAUSING GASTROENTERITIS, WILL MONITOR 

SYMPTOMS.





UTI - MONITOR CULTURE AND SENSITIVITY





HYPOKALEMIA AND HYPOMAGNESEMIA - REPLENISH WITH IV FLUIDS AND IV ELECTROLYTES





WEAKNESS - START THERAPY 


PT MAY NEED NURSING HOME FOR REHAB


Diagnosis/Problems:  





Clinical Quality Measures


DVT/VTE Risk/Contraindication:


Risk Factor Score Per Nursin


RFS Level Per Nursing on Admit:  2=Moderate








MOSES CHAIREZ MD 2017 09:00

## 2017-02-10 VITALS — SYSTOLIC BLOOD PRESSURE: 152 MMHG | DIASTOLIC BLOOD PRESSURE: 71 MMHG

## 2017-02-10 VITALS — SYSTOLIC BLOOD PRESSURE: 146 MMHG | DIASTOLIC BLOOD PRESSURE: 69 MMHG

## 2017-02-10 VITALS — DIASTOLIC BLOOD PRESSURE: 63 MMHG | SYSTOLIC BLOOD PRESSURE: 141 MMHG

## 2017-02-10 RX ADMIN — IPRATROPIUM BROMIDE AND ALBUTEROL SULFATE SCH ML: .5; 3 SOLUTION RESPIRATORY (INHALATION) at 06:39

## 2017-02-10 RX ADMIN — CLOPIDOGREL BISULFATE SCH MG: 75 TABLET, FILM COATED ORAL at 11:39

## 2017-02-10 RX ADMIN — ENALAPRIL MALEATE SCH MG: 10 TABLET ORAL at 11:39

## 2017-02-10 RX ADMIN — PANTOPRAZOLE SODIUM SCH MG: 40 TABLET, DELAYED RELEASE ORAL at 11:39

## 2017-02-10 RX ADMIN — METOPROLOL TARTRATE SCH MG: 25 TABLET, FILM COATED ORAL at 11:39

## 2017-02-10 RX ADMIN — ATORVASTATIN CALCIUM SCH MG: 10 TABLET, FILM COATED ORAL at 11:40

## 2017-02-10 RX ADMIN — Medication SCH EA: at 11:39

## 2017-02-10 RX ADMIN — LACTOBACILLUS TAB SCH TAB.CHEW: TAB at 05:08

## 2017-02-10 RX ADMIN — ONDANSETRON PRN MG: 2 INJECTION, SOLUTION INTRAMUSCULAR; INTRAVENOUS at 11:35

## 2017-02-10 RX ADMIN — SODIUM CHLORIDE AND POTASSIUM CHLORIDE SCH MLS/HR: 9; 2.98 INJECTION, SOLUTION INTRAVENOUS at 07:40

## 2017-02-10 RX ADMIN — ONDANSETRON PRN MG: 2 INJECTION, SOLUTION INTRAMUSCULAR; INTRAVENOUS at 05:04

## 2017-02-10 RX ADMIN — VERAPAMIL HYDROCHLORIDE SCH MG: 240 TABLET, FILM COATED, EXTENDED RELEASE ORAL at 11:37

## 2017-02-10 RX ADMIN — LACTOBACILLUS TAB SCH TAB.CHEW: TAB at 11:37

## 2017-02-10 RX ADMIN — METRONIDAZOLE SCH MLS/HR: 5 INJECTION, SOLUTION INTRAVENOUS at 05:09

## 2017-02-10 RX ADMIN — Medication PRN ML: at 11:36

## 2017-02-10 NOTE — DISCHARGE SUMMARY
Diagnosis/Chief Complaint


Date of Admission


2017 at 16:31


Date of Discharge





Discharge Date:  Feb 10, 2017


Discharge Time:  1400


Admission Diagnosis


Admission Diagnosis


ABDOMINAL PAIN


DIARRHEA


UTI


NAUSEA


HYPOKALEMIA


HYPOMAGNESEMIA


WEAKNESS





Discharge Diagnosis


ABDOMINAL PAIN


DIARRHEA


UTI


NAUSEA


HYPOKALEMIA


HYPOMAGNESEMIA


WEAKNESS





Reason Hospital Visit


PT IS AN 84 Y/O FEMALE WHO IS NEW TO ME, PER EMERGENCY DEPT PHYSICIAN, PT 

PLANNING TO TRANSFER TO MY CARE, HAS NOT YET ESTABLISHED WITH ME AS A PATIENT.


THE PATIENT REPORTEDLY HAD SIGNIFICANT DIZZINESS, SOME NAUSEA WITHOUT EMESIS, 

AND CONFUSION.


PT WAS BROUGHT TO HOSPITAL BY FAMILY, FOUND TO HAVE PROFOUND HYPOKALEMIA AND 

URINARY TRACT INFECTION.  PT WAS ADMITTED FROM THE EMERGENCY DEPT FOR IV FLUIDS 

AND IV ANTIBIOTICS.





Discharge Summary


Discharge Physical Examination


Allergies:  


Coded Allergies:  


     prednisone (Verified  Allergy, Unknown, 9/18/15)


     codeine (Verified  Adverse Reaction, Mild, N/V, SWEATS, 9/18/15)


Uncoded Allergies:  


     NARCOTICS (Adverse Reaction, Intermediate, 13)


Vitals & I&Os





General Appearance:  Alert, Oriented X3, Cooperative, No Acute Distress


HEENT:  Atraumatic, PERRLA


Respiratory:  Clear to Auscultation


Cardiovascular:  Regular Rate


Abdominal:  Normal Bowel Sounds, Soft


Extremities:  No Edema


Skin:  No Rashes


Neuro:  Cranial Nerves 3-12 NL


Psych/Mental Status:  Mental Status NL, Mood NL





Hospital Course


ABDOMINAL PAIN


DIARRHEA


NAUSEA


UTI


HYPOKALEMIA


HYPOMAGNESEMIA


WEAKNESS





ABDOMINAL PAIN - DIARRHEA - NAUSEA - REPLENISHED WITH IV FLUIDS - IMPROVED 

SYMPTOMS





UTI -FINISH TREATMENT





HYPOKALEMIA AND HYPOMAGNESEMIA - REPLENISHED WITH IV FLUIDS AND IV ELECTROLYTES





WEAKNESS - STARTED THERAPY


 


ARM FOR REHAB





Discharge


Condition at discharge


IMPROVING


Instructions to patient/family


Please see electonic discharge instructions given to patient.


Discharge Medications


Reviewed and agree with Discharge Medication list on patient's Discharge 

Instruction sheet





Clinical Quality Measures


DVT/VTE Risk/Contraindication:


Risk Factor Score Per Nursin


RFS Level Per Nursing on Admit:  2=Moderate








MOSES CHAIREZ MD Feb 10, 2017 08:18

## 2017-02-10 NOTE — DISCHARGE INST-SKILLED NURSING
Discharge Inst-Skilled NF


Patient Instructions


Patient Problems:  


HYPOTHYROID


RECENT GASTROENTERITIS


URINARY URGENCY


URINARY INCONTINENCE


COPD


NOCTURAL HYPOXEMIA WITH OXYGEN DEPENDENCE AT NIGHT





Consult/Follow Up/Orders


Follow Up Appt.:  


2 WEEKS WITH CONTRERAS Magdaleno NF Admit to:  [facility]


Certification (SNF)


I certify that SNF services are required to be given on an inpatient basis 

because of the above named patient's need for skilled nursing care on a 

continuing basis for the conditions(s) for which he/she was receiving inpatient 

hospital services prior to his/her transfer to the SNF.


Skilled Nursing Facility Order:  Nursing Services, Occupational Ther-Evaluate & 

Treat, Physical Therapy-Evaluate & Treat


Discharge Diet:  Other Diet (ADVANCE AS TOLERATED)





New & Resume Previous Orders


New & Resume Previous Orders


NOCTURNAL OXYGEN USE WITH HUMIDIFICATION - PLEASE START ASAP ON ADMISSION - MAY 

USE HOME OXYGEN SUPPLY


BEDSIDE COMMODE


[facility] 


Feb 10, 2017 


08:14


Medication List:





 Active Scripts


 Active


Levothyroxine Sodium 100 Mcg Tablet 100 Mcg PO DAILY


Floranex Tablet (L. Acidophilus/Bulgaricus) 1 Each Tablet 1 Tab.chew PO AC


Diphenoxylate-Atrop 2.5-0.025 (Diphenoxylate HCl/Atropine) 1 Each Tablet 1 Ea 

PO QID PRN


Acetaminophen 500 Mg Tablet 500 Mg PO Q4H PRN


 Reported


Montelukast Sodium 10 Mg Tablet 10 Mg PO HS


Tolterodine Tartrate 1 Mg Tablet 1 Mg PO BID


Meclizine HCl 25 Mg Tablet 25 Mg PO DAILY PRN


Preservision Areds Softgel (Vit A/C/E/Zinc/Co) 1 Cap Capsule 1 Cap PO BID


Acetaminophen ER (Acetaminophen) 650 Mg Tablet.er 650 Mg PO DAILY PRN


Metoprolol Tartrate 25 Mg Tablet 25 Mg PO BID


Trazodone HCl 50 Mg Tablet 50 Mg PO HS PRN


Clopidogrel (Clopidogrel Bisulfate) 75 Mg Tablet 75 Mg PO DAILY


Atorvastatin Calcium 10 Mg Tablet 10 Mg PO DAILY


Pantoprazole Sodium 40 Mg Tablet.dr 40 Mg PO DAILY


Levothyroxine Sodium 125 Mcg Tablet 125 Mg PO DAILY


Verapamil ER (Verapamil HCl) 240 Mg Tablet.er 240 Mg PO DAILY


Enalapril Maleate 10 Mg Tablet 10 Mg PO BID


Proair Hfa (Albuterol Sulfate) 8.5 Gm Hfa.aer.ad 2 Puff IH Q4H PRN


My orders:





 Orders-MOSES HERNÁNDEZ MD


Diphenoxylate/Atropine Tablet (Lomotil T (2/9/17 09:00)


Diphenoxylate/Atropine Tablet (Lomotil T (2/9/17 09:00)


Social Service (2/9/17 08:55)


Patient Visit (2/9/17 )


Functional Activities, Ea 15 (2/9/17 )


Tolterodine La Capsule (Detrol La Capsul (2/10/17 09:00)


Attending Discharge (2/10/17 08:13)








MOSES HERNÁNDEZ MD Feb 10, 2017 08:16

## 2017-02-27 ENCOUNTER — HOSPITAL ENCOUNTER (OUTPATIENT)
Dept: HOSPITAL 75 - HH | Age: 82
End: 2017-02-27
Attending: FAMILY MEDICINE
Payer: MEDICARE

## 2017-02-27 DIAGNOSIS — N39.0: Primary | ICD-10-CM

## 2017-02-27 LAB
BILIRUB UR QL STRIP: NEGATIVE
KETONES UR QL STRIP: NEGATIVE
LEUKOCYTE ESTERASE UR QL STRIP: (no result)
NITRITE UR QL STRIP: NEGATIVE
PH UR STRIP: 7 [PH] (ref 5–9)
PROT UR QL STRIP: (no result)
SP GR UR STRIP: 1.01 (ref 1.02–1.02)
UROBILINOGEN UR-MCNC: 1 MG/DL

## 2017-02-27 PROCEDURE — 81000 URINALYSIS NONAUTO W/SCOPE: CPT

## 2017-02-27 PROCEDURE — 87088 URINE BACTERIA CULTURE: CPT

## 2017-02-27 NOTE — XMS REPORT
Continuity of Care Document

 Created on: 02/10/2017



OMALLEY, COLLETTE Y

External Reference #: [id number]

: 1931

Sex: Female



Demographics







 Address  [address] 

[place]  [address]

 

 Home Phone  [phone]

 

 Preferred Language  English

 

 Marital Status  Unknown

 

 Temple Affiliation  Unknown

 

 Race  Unknown

 

 Ethnic Group  Unknown





Author







 Author  [facility]

 

 Organization  [facility]

 

 Address  Unknown

 

 Phone  Unavailable







Support







 Name  Relationship  Address  Phone

 

 MALKA CABELLO MD  Caregiver  [address]

[place]  [address] [phone]

 

 MOSES HERNÁNDEZ MD  Caregiver  Unknown  [phone]

 

 YADIRA MATHUR MD  Caregiver  [address]

[place]  [address] [phone]

 

 BOLA MCCONNELL  Next Of Kin  [address] [place]  [address] [phone]







Care Team Providers







 Care Team Member Name  Role  Phone

 

 MOSES HERNÁNDEZ MD  PCP  [phone]







Insurance Providers







 Payer Name  Policy Number  Subscriber Name  Relationship

 

 [employer]  [id number]  Omalley,Collette Y  18 Self / Same As Patient







Advance Directives







 Directive  Response  Recorded Date/Time

 

 Advance Directives  Yes  17 5:43pm

 

 Health Care Power of   Y Brian Mcconnell  17 5:43pm

 

 Organ Donor  No  17 5:43pm

 

 Resuscitation Status  DNR-Pt Request  17 5:43pm







Chief Complaint and Reason for Visit







 Chief Complaint  HYPOKALEMIA,HYPOMAGNESEMIA,UTI,DIZZINESS,THYROID

 

 Reason for Visit  Elevated brain natriuretic peptide (BNP) level

Hypomagnesemia

Intractable nausea and vomiting

Nausea

Right lower lobe pneumonia

Thyroid dysfunction

Urinary tract infection







Problems

Active Problems







 Medical Problem  Onset Date  Status

 

 Dizziness  Unknown  Acute

 

 Elevated brain natriuretic peptide (BNP) level  Unknown  Acute

 

 Generalized weakness  Unknown  Acute

 

 Hypokalemia  Unknown  Acute

 

 Hypomagnesemia  Unknown  Acute

 

 Intractable nausea and vomiting  Unknown  Acute

 

 Lightheadedness  Unknown  Acute

 

 Nausea  Unknown  Acute

 

 Right lower lobe pneumonia  Unknown  Acute

 

 Thyroid dysfunction  Unknown  Acute

 

 Urinary tract infection  Unknown  Acute







Medications

Current Home Medications







 Medication  Dose  Units  Route  Directions  Days/Qty  Instructions  Start Date

 

 Albuterol Sulfate 8.5 Gm  2  Puff  Inhalation  Every 4HRS as needed for 
Shortness Of Breath        09/18/15

 

 Enalapril Maleate 10 Mg  10  Mg  Oral  Twice A Day        09/18/15

 

 Verapamil Hcl 240 Mg  240  Mg  Oral  Daily        09/18/15

 

 Pantoprazole Sodium 40 Mg  40  Mg  Oral  Daily        09/18/15

 

 Atorvastatin Calcium 10 Mg  10  Mg  Oral  Daily        09/18/15

 

 Clopidogrel Bisulfate 75 Mg  75  Mg  Oral  Daily        09/18/15

 

 Trazodone Hcl 50 Mg  50  Mg  Oral  Bedtime as needed for Sleep        09/18/15

 

 Metoprolol Tartrate 25 Mg  25  Mg  Oral  Twice A Day        09/18/15

 

 Vit A/C/E/Zinc/Co 1 Cap  1  Cap  Oral  Twice A Day        09/18/15

 

 Meclizine Hcl 25 Mg  25  Mg  Oral  Daily as needed for Vertigo        16

 

 Tolterodine Tartrate 1 Mg  1  Mg  Oral  Twice A Day        16

 

 Montelukast Sodium 10 Mg  10  Mg  Oral  Bedtime        16

 

 Acetaminophen 500 Mg  500  Mg  Oral  Every 4HRS as needed for Fever  90     02/
10/17

 

 Diphenoxylate Hcl/Atropine 1 Each  1  Ea  Oral  Four Times Daily as needed for 
Diarrhea  30     02/10/17

 

 L. Acidophilus/Bulgaricus 1 Each  1  Tab.chew  Oral  Before Meals  90     02/10
/17

 

 Levothyroxine Sodium 100 Mcg  100  Mcg  Oral  Daily  30     02/10/17





Past Home Medications







 Medication  Directions  Ordered  Status

 

 Clarithromycin 500 Mg Tab,     09  Discontinued

 

 [Thyroid Pill] ,     09  Discontinued

 

 [Bp Pill] ,     09  Discontinued

 

 [Pravastatin] ,     09  Discontinued

 

 Albuterol 17 Gm Aerosol,     09  Discontinued

 

 Esomeprazole Magnesium 40 Mg Capsule.dr, 40 Mg Oral     09  Discontinued

 

 Hydrochlorothiazide 12.5 Mg Cap,     09  Discontinued

 

 Levothyroxine Sodium 125 Mcg Tablet, 125 Mcg Oral  Daily  09  
Discontinued

 

 Pravastatin Sodium 20 Mg Tablet, 20 Mg Oral  Daily  09  Discontinued

 

 Albuterol 17 Gm Aerosol,     09  Discontinued

 

 Trandolapril 4 Mg Tablet, 4 Mg Oral  Daily  09  Discontinued

 

 Verapamil Hcl 240 Mg Cap24h.pel, 240 Mg Oral  Daily  09  Discontinued

 

 Meclizine Hcl 25 Mg Tab, 25 Mg Oral  Three Times A Day as needed  09  
Discontinued

 

 Acetaminophen 500 Mg Tablet, 1000 Mg Oral  Every 6 Hours as needed  13  
Discontinued

 

 Aspirin 81 Mg Tabec, 81 Mg Oral  Daily  13  Discontinued

 

 Clopidogrel Bisulfate 75 Mg Tablet, 1 Each Oral  Daily  13  Discontinued

 

 Enalapril Maleate 10 Mg Tablet, 1 Tab Oral  Twice A Day  13  Discontinued

 

 Metoprolol Tartrate (Lopressor) 25 Mg Tablet, 1 Tab Oral  Twice A Day    Discontinued

 

 Potassium Chloride 10 Meq Tablet.sa, 10 Meq Oral  Daily  13  Discontinued

 

 Senna 1 Ea Tablet, 1 Ea Oral  Twice A Day  13  Discontinued

 

 Trazodone Hcl 50 Mg Tablet, 50 Mg Oral  Bedtime  13  Discontinued

 

 Levofloxacin 500 Mg Tablet, 500 Mg Oral  Daily  13  Discontinued

 

 Atorvastatin Calcium 10 Mg Tablet, 10 Mg Oral  Daily  13  Discontinued

 

 Pantoprazole Sod 40 Mg Tab, 40 Mg Oral  Daily  13  Discontinued

 

 Alprazolam 0.5 Mg Tab.rapdis, 1 Each Oral  Every 12 Hours as needed  13  
Discontinued

 

 Mirabegron 25 Mg Tab.er.24h, 25 Mg Oral  Daily  13  Discontinued

 

 Levothyroxine Sodium 125 Mcg Tablet, 125 Mg Oral  Daily  09/18/15  Discontinued

 

 Acetaminophen 650 Mg Tablet.er, 650 Mg Oral  Daily as needed for Pain  09/18/
15  Discontinued







Social History







 Social History Problem  Response  Recorded Date/Time

 

 Alcohol Use  Occasionally Uses  2015 3:00pm

 

 Recreational Drug Use  No  2015 3:00pm

 

 Recent Foreign Travel  No  2017 5:58pm

 

 Recent Infectious Disease Exposure  No  2017 5:58pm

 

 Hospitalization with Isolation  Denies  02/10/2017 2:21pm

 

 Sexually Transmitted Disease  No  2017 5:45pm

 

 HIV/AIDS  No  2017 5:45pm

 

 Smoking Status  Former Smoker  2017 5:56pm

 

 Do you dip or chew tobacco?  No  2015 3:00pm

 

 Recent Hopitalizations  No  2017 5:45pm

 

 Sexually Transmitted Disease  No  2017 5:45pm

 

 Hospitalization with Isolation  Denies  02/10/2017 2:21pm









 Query  Response  Start Date  Stop Date

 

 Smoking Status  Former Smoker     1999







Hospital Discharge Instructions

 

Patient Instructions

Physician Instructions

 

Patient Problems:  

HYPOTHYROID

RECENT GASTROENTERITIS

URINARY URGENCY

URINARY INCONTINENCE

COPD

NOCTURAL HYPOXEMIA WITH OXYGEN DEPENDENCE AT NIGHT

Follow Up Appt.:  

2 WEEKS WITH [facility]

Certification (SNF)

I certify that SNF services are required to be given on an inpatient basis 

because of the above named patient's need for skilled nursing care on a 

continuing basis for the conditions(s) for which he/she was receiving inpatient 

hospital services prior to his/her transfer to the SNF.

Skilled Nursing Facility Order:  Nursing Services, Occupational Ther-Evaluate & 

Treat, Physical Therapy-Evaluate & Treat

Discharge Diet:  Other Diet (ADVANCE AS TOLERATED)

New & Resume Previous Orders

NOCTURNAL OXYGEN USE WITH HUMIDIFICATION - PLEASE START ASAP ON ADMISSION - MAY 

USE HOME OXYGEN SUPPLY

BEDSIDE COMMODE

[facility] 

Feb 10, 2017 

08:14

 

 

Care Plan

Patient Problems: HYPOTHYROIDRECENT GASTROENTERITISURINARY URGENCYURINARY 

INCONTINENCECOPDNOCTURAL HYPOXEMIA WITH OXYGEN DEPENDENCE AT NIGHT

 



Plan of Care







 Discharge Date  02/10/17 2:20pm

 

 Disposition  05 XFER OTHER

 

 Instructions/Education Provided  Viral Gastroenteritis, Adult (DC)

 

 Prescriptions  See Medication Section

 

 Follow-up Orders  TSH

Free T4 (Free Thyrox

CBC with Auto Diff

CMP

 

 Referrals  [facility] (Unspecified) - 2 Weeks



Reason(s) for Referral:

Hypomagnesemia

Generalized weakness

Nausea

Hypokalemia

Thyroid dysfunction

Dizziness

 

 Care Plan and Goals  See Discharge Instructions Section







Functional Status







 Query  Response  Date Recorded

 

 Patient Orientation  Normal For Age

  2017 11:18am

 

 Patient Orientation  Person

Place

Time

Situation

Eyes Open

  February 10, 2017 2:21pm

 

 Comprehension Ability  Understands Concepts

  2017 5:45pm







Allergies, Adverse Reactions, Alerts







 Allergen  Type  Severity  Reaction  Status  Last Updated

 

 Codeine  Adverse Reaction  Mild  N/V, SWEATS  Active  09/18/15

 

 prednisone ([id number])  Allergy  Unknown     Active  09/18/15

 

 NARCOTICS  Adverse Reaction  Intermediate     Active  13







Immunizations

No immunization records.



Vital Signs

Acute Vital Signs





 Vital  Response  Date/Time

 

 Temperature (Fahrenheit)  98.1 degrees F (97.6 - 99.5)  02/10/2017 2:16pm

 

 Temperature (Calculated Celsius)  36.79062 degrees C (36.4 - 37.5)  02/10/2017 
8:00am

 

 Temperature Source  Tympanic  02/10/2017 2:16pm

 

 Pulse Rate (adult)  73 bpm (60 - 90)  02/10/2017 2:16pm

 

 Respiratory Rate  18 bpm (12 - 24)  02/10/2017 2:16pm

 

 O2 Sat by Pulse Oximetry  93 % (88 - 100)  02/10/2017 2:16pm

 

 Blood Pressure  146/69 mm Hg  02/10/2017 2:16pm

 

    Blood Pressure Mean  94 mm Hg  02/10/2017 8:00am

 

 Pain      

 

    Numeric Pain Scale  0-No Pain  02/10/2017 2:16pm

 

    Pain Intensity  AA  02/10/2017 1:37pm

 

 Height (Feet)  5 feet  2017 5:58pm

 

 Height (Inches)  3.00 inches  2017 5:58pm

 

 Height (Calculated Centimeters)  160.739759 cm  2017 5:58pm

 

 Weight (Pounds)  112 pounds  2017 5:58pm

 

 Weight (Ounces)  0.5 oz  2017 5:58pm

 

 Weight (Calculated Grams)  87226.52 gm  2017 5:58pm

 

 Weight (Calculated Kilograms)  50.240233 kilograms  2017 5:58pm

 

 Calculated BMI  19.9  2017 5:58pm

 

 Capillary Refill      

 

    Capillary Refill  Less Than 3 Seconds  2017 1:39pm







Results

Laboratory Results







 Test Name  Result  Units  Flags  Reference  Collection Date/Time  Result Date/
Time  Comments

 

 White Blood Count  3.1  10^3/uL  L  4.3-11.0  2017 7:12am  2017 7:
44am   

 

 Red Blood Count  3.73  10^6/uL  L  4.35-5.85  2017 7:12am  2017 7:
44am   

 

 Hemoglobin  12.0  G/DL     11.5-16.0  2017 7:12am  2017 7:44am   

 

 Hematocrit  37  %     35-52  2017 7:12am  2017 7:44am   

 

 Mean Corpuscular Volume  100  FL  H  80-99  2017 7:12am  2017 7:
44am   

 

 Mean Corpuscular Hemoglobin  32  PG     25-34  2017 7:12am  2017 7:
44am   

 

 Mean Corpuscular Hemoglobin Concent  32  G/DL     32-36  2017 7:12am  2017 7:44am   

 

 Red Cell Distribution Width  13.9  %     10.0-14.5  2017 7:122017 7:44am   

 

 Platelet Count  118  10^3/uL  L  130-400  2017 7:122017 7:44am
   

 

 Mean Platelet Volume  11.4  FL  H  7.4-10.4  2017 7:122017 7:
44am   

 

 Neutrophils (%) (Auto)  64  %     42-75  2017 6:2017 6:54am 
  

 

 Lymphocytes (%) (Auto)  17  %     12-44  2017 6:2017 6:54am 
  

 

 Monocytes (%) (Auto)  19  %  H  0-12  2017 6:2017 6:54am   

 

 Eosinophils (%) (Auto)  0  %     0-10  2017 6:2017 6:54am   

 

 Basophils (%) (Auto)  0  %     0-10  2017 6:2017 6:54am   

 

 Neutrophils # (Auto)  2.9  X 10^3     1.8-7.8  2017 6:2017 6:
54am   

 

 Lymphocytes # (Auto)  0.8  X 10^3  L  1.0-4.0  2017 6:2017 6:
54am   

 

 Monocytes # (Auto)  0.9  X 10^3     0.0-1.0  2017 6:2017 6:
54am   

 

 Eosinophils # (Auto)  0.0  10^3/uL     0.0-0.3  2017 6:2017 6
:54am   

 

 Basophils # (Auto)  0.0  10^3/uL     0.0-0.1  2017 6:2017 6:
54am   

 

 Neutrophils % (Manual)  77  %        2017 6:2017 7:23am   

 

 Band Neutrophils  0  %        2017 6:2017 7:23am   

 

 Lymphocytes % (Manual)  7  %        2017 6:2017 7:23am   

 

 Monocytes % (Manual)  10  %        2017 6:16am  2017 7:23am   

 

 Eosinophils % (Manual)  0  %        2017 6:16am  2017 7:23am   

 

 Basophils % (Manual)  0  %        2017 6:16am  2017 7:23am   

 

 Reactive Lymphocytes  6  %        2017 6:16am  2017 7:23am   

 

 Blood Morphology Comment  NORMAL           2017 6:16am  2017 7:
23am   

 

 Urine Color  YELLOW           2017 3:03pm  2017 3:51pm   

 

 Urine Clarity  SLIGHTLY CLOUDY           2017 3:03pm  2017 3:51pm 
  

 

 Urine pH  6        5-9  2017 3:03pm  2017 3:51pm   

 

 Urine Specific Gravity  1.020        1.016-1.022  2017 3:03pm  2017 3:51pm   

 

 Urine Protein  3+     *  NEGATIVE  2017 3:03pm  2017 3:51pm   

 

 Urine Glucose (UA)  NEGATIVE        NEGATIVE  2017 3:03pm  2017 3:
51pm   

 

 Urine RBC (Auto)  3+     *  NEGATIVE  2017 3:03pm  2017 3:51pm   

 

 Urine Ketones  4+     *  NEGATIVE  2017 3:03pm  2017 3:51pm   

 

 Urine Nitrite  NEGATIVE        NEGATIVE  2017 3:03pm  2017 3:51pm 
  

 

 Urine Bilirubin  NEGATIVE        NEGATIVE  2017 3:03pm  2017 3:
51pm   

 

 Urine Urobilinogen  NORMAL  MG/DL     NORMAL  2017 3:03pm  2017 3:
51pm   

 

 Urine Leukocyte Esterase  NEGATIVE        NEGATIVE  2017 3:03pm  2017 3:51pm   

 

 Urine RBC  2-5  /HPF  *     2017 3:03pm  2017 3:51pm   

 

 Urine WBC  10-25  /HPF  *     2017 3:03pm  2017 3:51pm   

 

 Urine Bacteria  FEW  /HPF  *     2017 3:03pm  2017 3:51pm   

 

 Urine Squamous Epithelial Cells  10-25  /HPF  *     2017 3:03pm  2017 3:51pm   

 

 Urine Crystals  NONE  /LPF        2017 3:03pm  2017 3:51pm   

 

 Urine Casts  PRESENT  /LPF        2017 3:03pm  2017 3:51pm   

 

 Urine Hyaline Casts  25-50  /LPF  *     2017 3:03pm  2017 3:51pm   

 

 Urine Mucus  MODERATE  /LPF  *     2017 3:03pm  2017 3:51pm   

 

 Urine Culture Indicated  YES           2017 3:03pm  2017 3:51pm   

 

 Sodium Level  139  MMOL/L     135-145  2017 7:12am  2017 8:01am   

 

 Potassium Level  5.0  MMOL/L     3.6-5.0  2017 7:12am  2017 8:01am
   

 

 Chloride Level  110  MMOL/L  H    2017 7:12am  2017 8:01am   

 

 Carbon Dioxide Level  24  MMOL/L     21-32  2017 7:12am  2017 8:
01am   

 

 Anion Gap  5  MMOL/L     5-14  2017 7:12am  2017 8:01am   

 

 Blood Urea Nitrogen  4  MG/DL  L  7-18  2017 7:12am  2017 8:01am   

 

 Creatinine  0.68  MG/DL     0.60-1.30  2017 7:12am  2017 8:01am   

 

 BUN/Creatinine Ratio  6           2017 7:12am  2017 8:01am   

 

 Estimat Glomerular Filtration Rate  > 60           2017 7:12am  2017 8:01am                    GFR INTERPRETIVE DATA  

  

         UNITS FOR ESTIMATED GFR (eGFR): mL/min/1.73 M2  

         REFERENCE RANGE FOR ESTIMATED GFR (eGFR)  

                                          eGFR  

         NORMAL eGFR                       >60  

         MODERATELY DECREASED eGFR          30-59  

         SEVERLY DECREASED eGFR             15-29  

         KIDNEY FAILURE                    <15 (OR DIALYSIS)  

 

 Glucose Level  86  MG/DL       2017 7:12am  2017 8:01am   

 

 Calcium Level  8.1  MG/DL  L  8.5-10.1  2017 7:12am  2017 8:01am   

 

 Magnesium Level  2.1  MG/DL     1.8-2.4  2017 6:16am  2017 7:10am 
  

 

 Total Bilirubin  0.3  MG/DL     0.1-1.0  2017 7:12am  2017 8:01am 
  

 

 Alkaline Phosphatase  58  U/L       2017 7:12am  2017 8:01am
   

 

 Aspartate Amino Transf (AST/SGOT)  19  U/L     5-34  2017 7:12am  2017 8:01am   

 

 Alanine Aminotransferase (ALT/SGPT)  11  U/L     0-55  2017 7:12am   8:01am   

 

 Total Protein  5.3  G/DL  L  6.4-8.2  2017 7:12am  2017 8:01am   

 

 Albumin  3.3  G/DL     3.2-4.5  2017 7:12am  2017 8:01am   

 

 Thyroid Stimulating Hormone (TSH)  0.03  UIU/ML  L  0.35-4.94  2017 1:
54pm  2017 4:02pm   

 

 Free Thyroxine  1.49  NG/DL  H  0.70-1.48  2017 1:54pm  2017 4:
02pm   

 

 Vitamin B12 Level  202  pg/mL     200-1000  2017 6:16am  2017 6:
45am  Test performed at [facility], CLIA# [id number]  

 [address] [place] [address]  







Procedures







 Procedure  Status  Date  Provider(s)

 

 Tracing only of electrocardiogram  Completed  17  MALKA CABELLO MD







Encounters







 Encounter  Location  Arrival/Admit Date  Discharge/Depart Date  Attending 
Provider

 

 Discharged Inpatient  [facility]  17 4:31pm  02/10/
17 2:20pm  MOSES HERNÁNDEZ MD











 Recent Diagnosis

 

 Elevated brain natriuretic peptide (BNP) level

 

 Hypomagnesemia

 

 Intractable nausea and vomiting

 

 Nausea

 

 Right lower lobe pneumonia

 

 Thyroid dysfunction

 

 Urinary tract infection

## 2018-04-28 ENCOUNTER — HOSPITAL ENCOUNTER (OUTPATIENT)
Dept: HOSPITAL 75 - ER | Age: 83
Setting detail: OBSERVATION
LOS: 1 days | Discharge: HOME | End: 2018-04-29
Attending: FAMILY MEDICINE | Admitting: INTERNAL MEDICINE
Payer: MEDICARE

## 2018-04-28 VITALS — SYSTOLIC BLOOD PRESSURE: 168 MMHG | DIASTOLIC BLOOD PRESSURE: 72 MMHG

## 2018-04-28 VITALS — SYSTOLIC BLOOD PRESSURE: 143 MMHG | DIASTOLIC BLOOD PRESSURE: 77 MMHG

## 2018-04-28 VITALS — BODY MASS INDEX: 24.54 KG/M2 | HEIGHT: 60 IN | WEIGHT: 125 LBS

## 2018-04-28 VITALS — DIASTOLIC BLOOD PRESSURE: 73 MMHG | SYSTOLIC BLOOD PRESSURE: 153 MMHG

## 2018-04-28 VITALS — SYSTOLIC BLOOD PRESSURE: 172 MMHG | DIASTOLIC BLOOD PRESSURE: 102 MMHG

## 2018-04-28 DIAGNOSIS — I25.10: ICD-10-CM

## 2018-04-28 DIAGNOSIS — K21.9: ICD-10-CM

## 2018-04-28 DIAGNOSIS — R00.2: ICD-10-CM

## 2018-04-28 DIAGNOSIS — Z79.899: ICD-10-CM

## 2018-04-28 DIAGNOSIS — E78.00: ICD-10-CM

## 2018-04-28 DIAGNOSIS — R07.89: Primary | ICD-10-CM

## 2018-04-28 DIAGNOSIS — I25.2: ICD-10-CM

## 2018-04-28 DIAGNOSIS — F03.90: ICD-10-CM

## 2018-04-28 DIAGNOSIS — R42: ICD-10-CM

## 2018-04-28 DIAGNOSIS — K44.9: ICD-10-CM

## 2018-04-28 DIAGNOSIS — Z95.5: ICD-10-CM

## 2018-04-28 DIAGNOSIS — E03.9: ICD-10-CM

## 2018-04-28 DIAGNOSIS — R11.0: ICD-10-CM

## 2018-04-28 DIAGNOSIS — J43.9: ICD-10-CM

## 2018-04-28 DIAGNOSIS — I10: ICD-10-CM

## 2018-04-28 LAB
ALBUMIN SERPL-MCNC: 4.2 GM/DL (ref 3.2–4.5)
ALP SERPL-CCNC: 81 U/L (ref 40–136)
ALT SERPL-CCNC: 15 U/L (ref 0–55)
AMYLASE SERPL-CCNC: 67 U/L (ref 25–125)
APTT BLD: 29 SEC (ref 24–35)
BASOPHILS # BLD AUTO: 0 10^3/UL (ref 0–0.1)
BASOPHILS NFR BLD AUTO: 0 % (ref 0–10)
BILIRUB SERPL-MCNC: 0.7 MG/DL (ref 0.1–1)
BUN/CREAT SERPL: 20
CALCIUM SERPL-MCNC: 9.5 MG/DL (ref 8.5–10.1)
CHLORIDE SERPL-SCNC: 101 MMOL/L (ref 98–107)
CK MB SERPL-MCNC: 1 NG/ML (ref ?–6.6)
CK SERPL-CCNC: 42 U/L (ref 29–168)
CO2 SERPL-SCNC: 24 MMOL/L (ref 21–32)
CREAT SERPL-MCNC: 0.75 MG/DL (ref 0.6–1.3)
EOSINOPHIL # BLD AUTO: 0.1 10^3/UL (ref 0–0.3)
EOSINOPHIL NFR BLD AUTO: 2 % (ref 0–10)
ERYTHROCYTE [DISTWIDTH] IN BLOOD BY AUTOMATED COUNT: 13.3 % (ref 10–14.5)
GFR SERPLBLD BASED ON 1.73 SQ M-ARVRAT: > 60 ML/MIN
GLUCOSE SERPL-MCNC: 105 MG/DL (ref 70–105)
HCT VFR BLD CALC: 40 % (ref 35–52)
HGB BLD-MCNC: 13.5 G/DL (ref 11.5–16)
INR PPP: 1 (ref 0.8–1.4)
LIPASE SERPL-CCNC: 30 U/L (ref 8–78)
LYMPHOCYTES # BLD AUTO: 2 X 10^3 (ref 1–4)
LYMPHOCYTES NFR BLD AUTO: 27 % (ref 12–44)
MAGNESIUM SERPL-MCNC: 2 MG/DL (ref 1.8–2.4)
MANUAL DIFFERENTIAL PERFORMED BLD QL: NO
MCH RBC QN AUTO: 34 PG (ref 25–34)
MCHC RBC AUTO-ENTMCNC: 34 G/DL (ref 32–36)
MCV RBC AUTO: 100 FL (ref 80–99)
MONOCYTES # BLD AUTO: 0.7 X 10^3 (ref 0–1)
MONOCYTES NFR BLD AUTO: 10 % (ref 0–12)
NEUTROPHILS # BLD AUTO: 4.6 X 10^3 (ref 1.8–7.8)
NEUTROPHILS NFR BLD AUTO: 61 % (ref 42–75)
PLATELET # BLD: 235 10^3/UL (ref 130–400)
PMV BLD AUTO: 10.9 FL (ref 7.4–10.4)
POTASSIUM SERPL-SCNC: 4.2 MMOL/L (ref 3.6–5)
PROT SERPL-MCNC: 7.4 GM/DL (ref 6.4–8.2)
PROTHROMBIN TIME: 13.3 SEC (ref 12.2–14.7)
RBC # BLD AUTO: 4.01 10^6/UL (ref 4.35–5.85)
SODIUM SERPL-SCNC: 136 MMOL/L (ref 135–145)
WBC # BLD AUTO: 7.6 10^3/UL (ref 4.3–11)

## 2018-04-28 PROCEDURE — 93041 RHYTHM ECG TRACING: CPT

## 2018-04-28 PROCEDURE — 93005 ELECTROCARDIOGRAM TRACING: CPT

## 2018-04-28 PROCEDURE — 96375 TX/PRO/DX INJ NEW DRUG ADDON: CPT

## 2018-04-28 PROCEDURE — 80320 DRUG SCREEN QUANTALCOHOLS: CPT

## 2018-04-28 PROCEDURE — 85610 PROTHROMBIN TIME: CPT

## 2018-04-28 PROCEDURE — 83880 ASSAY OF NATRIURETIC PEPTIDE: CPT

## 2018-04-28 PROCEDURE — 80053 COMPREHEN METABOLIC PANEL: CPT

## 2018-04-28 PROCEDURE — 83690 ASSAY OF LIPASE: CPT

## 2018-04-28 PROCEDURE — 83735 ASSAY OF MAGNESIUM: CPT

## 2018-04-28 PROCEDURE — 96376 TX/PRO/DX INJ SAME DRUG ADON: CPT

## 2018-04-28 PROCEDURE — 85730 THROMBOPLASTIN TIME PARTIAL: CPT

## 2018-04-28 PROCEDURE — 71045 X-RAY EXAM CHEST 1 VIEW: CPT

## 2018-04-28 PROCEDURE — 96374 THER/PROPH/DIAG INJ IV PUSH: CPT

## 2018-04-28 PROCEDURE — 84484 ASSAY OF TROPONIN QUANT: CPT

## 2018-04-28 PROCEDURE — 82550 ASSAY OF CK (CPK): CPT

## 2018-04-28 PROCEDURE — 82150 ASSAY OF AMYLASE: CPT

## 2018-04-28 PROCEDURE — 85025 COMPLETE CBC W/AUTO DIFF WBC: CPT

## 2018-04-28 PROCEDURE — 82553 CREATINE MB FRACTION: CPT

## 2018-04-28 PROCEDURE — 80061 LIPID PANEL: CPT

## 2018-04-28 PROCEDURE — 36415 COLL VENOUS BLD VENIPUNCTURE: CPT

## 2018-04-28 RX ADMIN — SUCRALFATE SCH GM: 1 TABLET ORAL at 17:27

## 2018-04-28 RX ADMIN — SUCRALFATE SCH GM: 1 TABLET ORAL at 21:32

## 2018-04-28 RX ADMIN — MECLIZINE HYDROCHLORIDE SCH MG: 25 TABLET ORAL at 17:27

## 2018-04-28 NOTE — DIAGNOSTIC IMAGING REPORT
Portable erect AP chest at 136 hours.



INDICATION: Chest pain.



FINDINGS: The cardiomegaly noted on the prior exam of 7/31/16 is

again evident and no different. There are chronic pulmonary

changes evident but there is no sign of failure, pneumonia or

pleural effusion to suggest an acute abnormality. However, I do

suspect that there is a hiatal hernia. If further evaluation is

desired, then either an esophagram or CT would be recommended.

The mediastinum is not widened. The osseous structures are

intact.



IMPRESSION:

1. There is cardiomegaly and chronic pulmonary disease, but there

is no evidence for an acute cardiopulmonary abnormality.

2. The findings do suggest a hiatal hernia. Additional

considerations as above. 



Dictated by: 



  Dictated on workstation # [id number]

## 2018-04-28 NOTE — XMS REPORT
Continuity of Care Document

 Created on: 2018



OMALLEY, COLLETTE Y

External Reference #: [id number]

: 1931

Sex: Female



Demographics







 Address  [address] [place]  [address]

 

 Home Phone  [phone] x

 

 Preferred Language  Unknown

 

 Marital Status  Unknown

 

 Christian Affiliation  Unknown

 

 Race  Unknown

 

 Ethnic Group  Unknown





Author







 Author  [facility]

 

 Address  Unknown

 

 Phone  Unavailable



              



Allergies

      





 Active            Description            Code            Type            
Severity            Reaction            Onset            Reported/Identified   
         Relationship to Patient            Clinical Status        

 

 Yes            NARCOTICS            NARCOTICS                         Moderate
            N/A                         2013                             
     

 

 Yes            codeine            [id number]            Drug Allergy          
  Mild            N/V, SWEATS                         2015               
                   

 

 Yes            prednisone            [id number]            Drug Allergy       
     Unknown            N/A                         2015                 
                 



                      



Medications

      



There is no data.                  



Problems

      





 Date Dx Coded            Attending            Type            Code            
Diagnosis            Diagnosed By        

 

 2011                         Ot            211.1            BENIGN 
NEOPLASM STOMACH                     

 

 2011                         Ot            530.11            REFLUX 
ESOPHAGITIS                     

 

 2011                         Ot            535.40            OTH 
SPECIFIED GASTRITIS,W/O MENTION OF H                     

 

 2011                         Ot            553.3            
DIAPHRAGMATIC HERNIA                     

 

 02/10/2013                         Ot            112.0            [facility]      
               

 

 02/10/2013                         Ot            244.9            
HYPOTHYROIDISM NOS                     

 

 02/10/2013                         Ot            263.9            PROTEIN-VLAD 
MALNUTR NOS                     

 

 02/10/2013                         Ot            272.4            
HYPERLIPIDEMIA NEC/NOS                     

 

 02/10/2013                         Ot            275.2            DIS 
MAGNESIUM METABOLISM                     

 

 02/10/2013                         Ot            276.1            
HYPOSMOLALITY                     

 

 02/10/2013                         Ot            276.8            
HYPOPOTASSEMIA                     

 

 02/10/2013                         Ot            300.00            ANXIETY 
STATE NOS                     

 

 02/10/2013                         Ot            305.1            TOBACCO USE 
DISORDER                     

 

 02/10/2013                         Ot            362.50            MACULAR 
DEGENERATION NOS                     

 

 02/10/2013                         Ot            365.9            GLAUCOMA NOS
                     

 

 02/10/2013                         Ot            401.9            HYPERTENSION 
NOS                     

 

 02/10/2013                         Ot            410.71            AC 
MYOCARDIAL INFARCT,SUBENDO INFARCT,IN                     

 

 02/10/2013                         Ot            414.01            CORONARY 
ATHEROSCLEROSIS OF NATIVE CORON                     

 

 02/10/2013                         Ot            492.8            EMPHYSEMA 
NEC                     

 

 02/10/2013                         Ot            518.84            ACUTE AND 
CHRONIC RESPIRATORY FAILURE                     

 

 02/10/2013                         Ot            530.81            ESOPHAGEAL 
REFLUX                     

 

 02/10/2013                         Ot            790.29            OTHER 
ABNORMAL GLUCOSE                     

 

 02/10/2013                         Ot            820.8            FX NECK OF 
FEMUR NOS-CL                     

 

 02/10/2013                         Ot            E000.8            OTHER 
EXTERNAL CAUSE STATUS                     

 

 02/10/2013                         Ot            E885.9            FALL FROM 
SLIPPING, TRIPPING, OR STUMBLI                     

 

 2013                         Ot            112.0            [facility]      
               

 

 2013                         Ot            272.4            
HYPERLIPIDEMIA NEC/NOS                     

 

 2013                         Ot            276.1            
HYPOSMOLALITY                     

 

 2013                         Ot            276.8            
HYPOPOTASSEMIA                     

 

 2013                         Ot            285.9            ANEMIA NOS  
                   

 

 2013                         Ot            300.00            ANXIETY 
STATE NOS                     

 

 2013                         Ot            401.9            HYPERTENSION 
NOS                     

 

 2013                         Ot            410.72            AC MYOCARD 
INFARCT,SUBENDO INFARCT,SUBSE                     

 

 2013                         Ot            414.01            CORONARY 
ATHEROSCLEROSIS OF NATIVE CORON                     

 

 2013                         Ot            492.8            EMPHYSEMA 
NEC                     

 

 2013                         Ot            530.81            ESOPHAGEAL 
REFLUX                     

 

 2013                         Ot            599.0            URIN TRACT 
INFECTION NOS                     

 

 2013                         Ot            V54.13            AFTERCARE 
HEALING TRAUMATIC FX HIP                     

 

 2013                         Ot            V57.1            PHYSICAL 
THERAPY NEC                     

 

 2013                         Ot            V57.21            ENCOUNTER 
FOR OCCUPATIONAL THERAPY                     

 

 2013                         Ot            V58.65            LONG-TERM(
CURRENT)USE OF STEROIDS                     

 

 2013            PETE MCBRIDE DO            Ot            414.01 
           CORONARY ATHEROSCLEROSIS OF NATIVE CORON                     

 

 2013            PETE MCBRIDE DO            Ot            496    
        CHR AIRWAY OBSTRUCT NEC                     

 

 2013            PETE MCBRIDE DO            Ot            831.00 
           DISLOC SHOULDER NOS-CLOS                     

 

 2013            PETE MCBRIDE DO            Ot            E888.9 
           FALL NOS                     

 

 2013            PETE MCBRIDE DO            Ot            V45.82 
           PERCUTANEOUS TRANSLUM CORON ANGIOPLASTY                      

 

 2015            YADIRA MATHUR MD            Ot            244.9       
     HYPOTHYROIDISM NOS                     

 

 2015            KAREEN DOLL, YADIRA KELLY            Ot            401.9       
     HYPERTENSION NOS                     

 

 2015            KAREEN DOLL, YADIRA KELLY            Ot            486         
   PNEUMONIA, ORGANISM NOS                     

 

 2015            YADIRA MATHUR MD            Ot            491.21      
      OBSTR CHRONIC BRONCHITIS, W (ACUTE) EXAC                     

 

 2015            KAREEN DOLL, YADIRA KELLY            Ot            V04.81      
      ND FOR PROPHYLACTIC VACCIN AND INOCULATI                     

 

 2015            KAREEN DOLL, YADIRA KELLY            Ot            V12.79      
      PERSONAL HISTORY OTH SPEC DIGESTIVE SYST                     

 

 2015            YADIRA MATHUR MD            Ot            V15.82      
      HISTORY OF TOBACCO USE                     

 

 2015            KAREEN DOLL, YADIRA KELLY            Ot            V45.82      
      PERCUTANEOUS TRANSLUM CORON ANGIOPLASTY                      

 

 02/15/2016                         Ot            786.50                       
           

 

 02/15/2016                         Ot            V15.82                       
           

 

 2016            JUMA RENE DO            Ot            J44.9       
     CHRONIC OBSTRUCTIVE PULMONARY DISEASE, U                     

 

 2016            JUMA RENE DO            Ot            R06.09      
      OTHER FORMS OF DYSPNEA                     

 

 2016            JUMA RENE DO            Ot            J44.9       
     CHRONIC OBSTRUCTIVE PULMONARY DISEASE, U                     

 

 2016            JUMA RENE DO            Ot            R06.09      
      OTHER FORMS OF DYSPNEA                     

 

 2016                         Ot            719.07            JOINT 
EFFUSION-ANKLE                     

 

 2016                         Ot            719.47            JOINT PAIN-
ANKLE                     

 

 2016                         Ot            959.7            LOWER LEG 
INJURY NOS                     

 

 2016                         Ot            E000.8            OTHER 
EXTERNAL CAUSE STATUS                     

 

 2016                         Ot            E849.0            ACCIDENT IN 
HOME                     

 

 2016                         Ot            E888.9            FALL NOS   
                  

 

 2016                         Ot            272.4            
HYPERLIPIDEMIA NEC/NOS                     

 

 2016                         Ot            414.01            CORONARY 
ATHEROSCLEROSIS OF NATIVE CORON                     

 

 2016                         Ot            V58.63            LONG-TERM(
CURRENT)USE OF ANTIPLATELET/AN                     

 

 2016            KALLI MORALES MD            Ot            272.4       
     HYPERLIPIDEMIA NEC/NOS                     

 

 2016            KALLI MORALES MD            Ot            401.9       
     HYPERTENSION NOS                     

 

 2016            KALLI MORALES MD            Ot            414.01      
      CORONARY ATHEROSCLEROSIS OF NATIVE CORON                     

 

 2016                         Ot            786.50            CHEST PAIN 
NOS                     

 

 2016                         Ot            V15.82            HISTORY OF 
TOBACCO USE                     

 

 2016            JUMA RENE DO            Ot            J44.9       
     CHRONIC OBSTRUCTIVE PULMONARY DISEASE, U                     

 

 2016            JUMA RENE DO            Ot            R06.09      
      OTHER FORMS OF DYSPNEA                     

 

 2016            YADIRA MATHUR MD            Ot            E03.9       
     HYPOTHYROIDISM, UNSPECIFIED                     

 

 2016            YADIRA MATHUR MD            Ot            H81.10      
      BENIGN PAROXYSMAL VERTIGO, UNSPECIFIED E                     

 

 2016            YADIRA MATHUR MD            Ot            I10         
   ESSENTIAL (PRIMARY) HYPERTENSION                     

 

 2016            YADIRA MATHUR MD            Ot            I25.10      
      ATHSCL HEART DISEASE OF NATIVE CORONARY                      

 

 2016            YADIRA MATHUR MD            Ot            I38         
   ENDOCARDITIS, VALVE UNSPECIFIED                     

 

 2016            YADIRA MATHUR MD R            Ot            J44.9       
     CHRONIC OBSTRUCTIVE PULMONARY DISEASE, U                     

 

 2016            YADIRA MATHUR MD R            Ot            K21.9       
     GASTRO-ESOPHAGEAL REFLUX DISEASE WITHOUT                     

 

 2016            YADIRA MATHUR MD R            Ot            M19.90      
      UNSPECIFIED OSTEOARTHRITIS, UNSPECIFIED                      

 

 2016            YADIRA MATHUR MD R            Ot            N39.0       
     URINARY TRACT INFECTION, SITE NOT SPECIF                     

 

 2016            YADIRA MATHUR MD R            Ot            R11.2       
     NAUSEA WITH VOMITING, UNSPECIFIED                     

 

 2016            YADIRA MATHUR MD R            Ot            Z66         
   DO NOT RESUSCITATE                     

 

 2016            YADIRA MATHUR MD R            Ot            Z87.891     
       PERSONAL HISTORY OF NICOTINE DEPENDENCE                     

 

 2016            YADIRA MATHUR MD R            Ot            Z95.5       
     PRESENCE OF CORONARY ANGIOPLASTY IMPLANT                     

 

 2016            YADIRA MATHUR MD R            Ot            Z99.81      
      DEPENDENCE ON SUPPLEMENTAL OXYGEN                     

 

 2016            YADIRA MATHUR MD R            Ot            E03.9       
     HYPOTHYROIDISM, UNSPECIFIED                     

 

 2016            YADIRA MATHUR MD R            Ot            H81.10      
      BENIGN PAROXYSMAL VERTIGO, UNSPECIFIED E                     

 

 2016            YADIRA MATHUR MD R            Ot            I10         
   ESSENTIAL (PRIMARY) HYPERTENSION                     

 

 2016            YADIRA MATHUR MD R            Ot            I25.10      
      ATHSCL HEART DISEASE OF NATIVE CORONARY                      

 

 2016            YADIRA MATHUR MD R            Ot            I38         
   ENDOCARDITIS, VALVE UNSPECIFIED                     

 

 2016            YADIRA MATHUR MD R            Ot            J44.9       
     CHRONIC OBSTRUCTIVE PULMONARY DISEASE, U                     

 

 2016            YADIRA MATHUR MD R            Ot            K21.9       
     GASTRO-ESOPHAGEAL REFLUX DISEASE WITHOUT                     

 

 2016            YADIRA MATHUR MD R            Ot            M19.90      
      UNSPECIFIED OSTEOARTHRITIS, UNSPECIFIED                      

 

 2016            YADIRA MATHUR MD R            Ot            N39.0       
     URINARY TRACT INFECTION, SITE NOT SPECIF                     

 

 2016            YADIRA MATHUR MD R            Ot            R11.2       
     NAUSEA WITH VOMITING, UNSPECIFIED                     

 

 2016            YADIRA MATHUR MD R            Ot            Z66         
   DO NOT RESUSCITATE                     

 

 2016            YADIRA MATHUR MD R            Ot            Z87.891     
       PERSONAL HISTORY OF NICOTINE DEPENDENCE                     

 

 2016            YADIRA MATHUR MD            Ot            Z95.5       
     PRESENCE OF CORONARY ANGIOPLASTY IMPLANT                     

 

 2016            YADIRA MATHUR MD            Ot            Z99.81      
      DEPENDENCE ON SUPPLEMENTAL OXYGEN                     

 

 2016            YADIRA MATHUR MD            Ot            E03.9       
     HYPOTHYROIDISM, UNSPECIFIED                     

 

 2016            YADIRA MATHUR MD            Ot            H83.01      
      LABYRINTHITIS, RIGHT EAR                     

 

 2016            YADIRA MATHUR MD            Ot            H83.09      
      LABYRINTHITIS, UNSPECIFIED EAR                     

 

 2016            YADIRA MATHUR MD            Ot            I10         
   ESSENTIAL (PRIMARY) HYPERTENSION                     

 

 2016            YADIRA MATHUR MD            Ot            I25.10      
      ATHSCL HEART DISEASE OF NATIVE CORONARY                      

 

 2016            YADIRA MATHUR MD            Ot            I38         
   ENDOCARDITIS, VALVE UNSPECIFIED                     

 

 2016            YADIRA MATHUR MD            Ot            J18.9       
     PNEUMONIA, UNSPECIFIED ORGANISM                     

 

 2016            YADIRA MATHUR MD            Ot            J44.9       
     CHRONIC OBSTRUCTIVE PULMONARY DISEASE, U                     

 

 2016            YADIRA MATHUR MD            Ot            K21.9       
     GASTRO-ESOPHAGEAL REFLUX DISEASE WITHOUT                     

 

 2016            YADIRA MATHUR MD            Ot            M19.90      
      UNSPECIFIED OSTEOARTHRITIS, UNSPECIFIED                      

 

 2016            YADIRA MATHUR MD            Ot            Z66         
   DO NOT RESUSCITATE                     

 

 2016            YADIRA MATHUR MD            Ot            Z87.891     
       PERSONAL HISTORY OF NICOTINE DEPENDENCE                     

 

 2016            YADIRA MATHUR MD            Ot            Z95.5       
     PRESENCE OF CORONARY ANGIOPLASTY IMPLANT                     

 

 2016            YADIRA MATHUR MD R            Ot            Z99.81      
      DEPENDENCE ON SUPPLEMENTAL OXYGEN                     

 

 2016                         Ot            719.07            JOINT 
EFFUSION-ANKLE                     

 

 2016                         Ot            719.47            JOINT PAIN-
ANKLE                     

 

 2016                         Ot            959.7            LOWER LEG 
INJURY NOS                     

 

 2016                         Ot            E000.8            OTHER 
EXTERNAL CAUSE STATUS                     

 

 2016                         Ot            E849.0            ACCIDENT IN 
HOME                     

 

 2016                         Ot            E888.9            FALL NOS   
                  

 

 2016                         Ot            272.4            
HYPERLIPIDEMIA NEC/NOS                     

 

 2016                         Ot            414.01            CORONARY 
ATHEROSCLEROSIS OF NATIVE CORON                     

 

 2016                         Ot            V58.63            LONG-TERM(
CURRENT)USE OF ANTIPLATELET/AN                     

 

 2016            KALLI MORALES MD            Ot            272.4       
     HYPERLIPIDEMIA NEC/NOS                     

 

 2016            ANDREW DOLL, KALLI MACK            Ot            401.9       
     HYPERTENSION NOS                     

 

 2016            KALLI MORALES MD            Ot            414.01      
      CORONARY ATHEROSCLEROSIS OF NATIVE CORON                     

 

 2016                         Ot            786.50            CHEST PAIN 
NOS                     

 

 2016                         Ot            V15.82            HISTORY OF 
TOBACCO USE                     

 

 2016            JUMA RENE DO            Ot            J44.9       
     CHRONIC OBSTRUCTIVE PULMONARY DISEASE, U                     

 

 2016            JUMA RENE DO            Ot            R06.09      
      OTHER FORMS OF DYSPNEA                     

 

 11/15/2016            ALON STOUT            Ot            
E78.2            MIXED HYPERLIPIDEMIA                     

 

 11/15/2016            ALON STOUT            Ot            
I10            ESSENTIAL (PRIMARY) HYPERTENSION                     

 

 11/15/2016            ALON STOUT            Ot            
I25.10            ATHSCL HEART DISEASE OF NATIVE CORONARY                      

 

 11/15/2016            ALON STOUT            Ot            
I65.23            OCCLUSION AND STENOSIS OF BILATERAL ROMO                     

 

 2016            ALON STOUT            Ot            
E78.2            MIXED HYPERLIPIDEMIA                     

 

 2016            ALON STOUT            Ot            
I10            ESSENTIAL (PRIMARY) HYPERTENSION                     

 

 2016            ALON STOUT            Ot            
I25.10            ATHSCL HEART DISEASE OF NATIVE CORONARY                      

 

 2016            ALON STOUT            Ot            
I65.23            OCCLUSION AND STENOSIS OF BILATERAL ROMO                     

 

 02/10/2017            MOSES CHAIREZ MD            Ot            E03.9     
       HYPOTHYROIDISM, UNSPECIFIED                     

 

 02/10/2017            MOSES CHAIREZ MD            Ot            E78.00    
        PURE HYPERCHOLESTEROLEMIA, UNSPECIFIED                     

 

 02/10/2017            MOSES CHAIREZ MD            Ot            E83.42    
        HYPOMAGNESEMIA                     

 

 02/10/2017            MOSES CHAIREZ MD            Ot            E87.6     
       HYPOKALEMIA                     

 

 02/10/2017            MOSES CHAIREZ MD            Ot            G47.36    
        SLEEP RELATED HYPOVENTILATION IN CONDITI                     

 

 02/10/2017            MOSES CHAIREZ MD            Ot            I10       
     ESSENTIAL (PRIMARY) HYPERTENSION                     

 

 02/10/2017            MOSES CHAIREZ MD            Ot            I25.10    
        ATHSCL HEART DISEASE OF NATIVE CORONARY                      

 

 02/10/2017            MOSES CHAIREZ MD, Ot            J44.9     
       CHRONIC OBSTRUCTIVE PULMONARY DISEASE, U                     

 

 02/10/2017            MOSES CHAIREZ MD, Ot            K21.9     
       GASTRO-ESOPHAGEAL REFLUX DISEASE WITHOUT                     

 

 02/10/2017            MOSES CHAIREZ MD, Ot            K52.9     
       NONINFECTIVE GASTROENTERITIS AND COLITIS                     

 

 02/10/2017            MOSES CHAIREZ MD, Ot            M19.91    
        PRIMARY OSTEOARTHRITIS, UNSPECIFIED SITE                     

 

 02/10/2017            MOSES CHAIREZ MD, Ot            N39.0     
       URINARY TRACT INFECTION, SITE NOT SPECIF                     

 

 02/10/2017            MOSES CHAIREZ MD, Ot            R42       
     DIZZINESS AND GIDDINESS                     

 

 02/10/2017            MOSES CHAIREZ MD, Ot            Z66       
     DO NOT RESUSCITATE                     

 

 02/10/2017            MOSES CHAIREZ MD, Ot            Z87.891   
         PERSONAL HISTORY OF NICOTINE DEPENDENCE                     

 

 02/10/2017            MOSES CHAIREZ MD, Ot            Z95.5     
       PRESENCE OF CORONARY ANGIOPLASTY IMPLANT                     

 

 02/10/2017            MOSES CHAIREZ MD, Ot            Z99.81    
        DEPENDENCE ON SUPPLEMENTAL OXYGEN                     

 

 2017            MOSES CHAIREZ MD, Ot            N39.0     
       URINARY TRACT INFECTION, SITE NOT SPECIF                     

 

 2017            MOSES CHAIREZ MD, Ot            N39.0     
       URINARY TRACT INFECTION, SITE NOT SPECIF                     

 

 2017            MOSES CHAIREZ MD, Ot            N39.0     
       URINARY TRACT INFECTION, SITE NOT SPECIF                     



                                                                               
                                                                               
                                                                               
                                                                               
                        



Procedures

      





 Code            Description            Performed By            Performed On   
     

 

             78.55                                  INTERNAL FIXATION-FEMUR    
                               2013        

 

             96.04                                  INSERT ENDOTRACHEAL TUBE   
                                2013        

 

             96.71                                  CONTINUOUS INVASIVE 
MECHANICAL VENTILATI                                   2013        

 

             00.66                                  PERCUTANEOUS TRANSLUMINAL 
CORONARY ANGIO                                   2013        

 

             36.07                                  INSRT OF DRUG-ELUTING CORON 
ARTERY STENT                                   2013        

 

             37.22                                  LEFT HEART CARDIAC CATH    
                               2013        

 

             88.53                                  LT HEART ANGIOCARDIOGRAM   
                                2013        

 

             88.56                                  CORONAR ARTERIOGR-2 CATH   
                                2013        



                                



Results

      





 Test            Result            Range        









 Serum or plasma thyroxine (T4) free measurement (mass/volume) - 16 18:10
         









 Serum or plasma thyroxine (T4) free measurement (mass/volume)            1.53 
ng/dL            0.70-1.48        









 Complete blood count (CBC) with automated white blood cell (WBC) differential 
- 16 18:15         









 Blood leukocytes automated count (number/volume)            7.9 10*3/uL       
     4.3-11.0        

 

 Blood erythrocytes automated count (number/volume)            4.11 10*6/uL    
        4.35-5.85        

 

 Venous blood hemoglobin measurement (mass/volume)            13.6 g/dL        
    11.5-16.0        

 

 Blood hematocrit (volume fraction)            40 %            35-52        

 

 Automated erythrocyte mean corpuscular volume            98 [foz_us]          
  80-99        

 

 Automated erythrocyte mean corpuscular hemoglobin (mass per erythrocyte)      
      33 pg            25-34        

 

 Automated erythrocyte mean corpuscular hemoglobin concentration measurement (
mass/volume)            34 g/dL            32-36        

 

 Automated erythrocyte distribution width ratio            12.4 %            
10.0-14.5        

 

 Automated blood platelet count (count/volume)            234 10*3/uL          
  130-400        

 

 Automated blood platelet mean volume measurement            11.4 [foz_us]     
       7.4-10.4        

 

 Automated blood neutrophils/100 leukocytes            49 %            42-75   
     

 

 Automated blood lymphocytes/100 leukocytes            38 %            12-44   
     

 

 Blood monocytes/100 leukocytes            9 %            0-12        

 

 Automated blood eosinophils/100 leukocytes            4 %            0-10     
   

 

 Automated blood basophils/100 leukocytes            0 %            0-10        

 

 Blood neutrophils automated count (number/volume)            3.9 10*3         
   1.8-7.8        

 

 Blood lymphocytes automated count (number/volume)            3.0 10*3         
   1.0-4.0        

 

 Blood monocytes automated count (number/volume)            0.7 10*3            
0.0-1.0        

 

 Automated eosinophil count            0.3 10*3/uL            0.0-0.3        

 

 Automated blood basophil count (count/volume)            0.0 10*3/uL          
  0.0-0.1        









 Comprehensive metabolic panel - 16 18:15         









 Serum or plasma sodium measurement (moles/volume)            135 mmol/L       
     135-145        

 

 Serum or plasma potassium measurement (moles/volume)            4.1 mmol/L    
        3.6-5.0        

 

 Serum or plasma chloride measurement (moles/volume)            103 mmol/L     
               

 

 Carbon dioxide            22 mmol/L            21-32        

 

 Serum or plasma anion gap determination (moles/volume)            10 mmol/L   
         5-14        

 

 Serum or plasma urea nitrogen measurement (mass/volume)            17 mg/dL   
         7-18        

 

 Serum or plasma creatinine measurement (mass/volume)            1.10 mg/dL    
        0.60-1.30        

 

 Serum or plasma urea nitrogen/creatinine mass ratio            15             
NRG        

 

 Serum or plasma creatinine measurement with calculation of estimated 
glomerular filtration rate            47             NRG        

 

 Serum or plasma glucose measurement (mass/volume)            111 mg/dL        
            

 

 Serum or plasma calcium measurement (mass/volume)            9.2 mg/dL        
    8.5-10.1        

 

 Serum or plasma total bilirubin measurement (mass/volume)            0.5 mg/dL
            0.1-1.0        

 

 Serum or plasma alkaline phosphatase measurement (enzymatic activity/volume)  
          96 U/L                    

 

 Serum or plasma aspartate aminotransferase measurement (enzymatic activity/
volume)            23 U/L            5-34        

 

 Serum or plasma alanine aminotransferase measurement (enzymatic activity/volume
)            24 U/L            0-55        

 

 Serum or plasma protein measurement (mass/volume)            6.9 g/dL         
   6.4-8.2        

 

 Serum or plasma albumin measurement (mass/volume)            4.3 g/dL         
   3.2-4.5        









 Magnesium - 16 18:15         









 Magnesium            2.2 mg/dL            1.8-2.4        









 Serum or plasma lithium measurement (moles/volume) - 16 18:15         









 BNP level            240.5 pg/mL            <100.0        









 Serum or plasma troponin i.cardiac measurement (mass/volume) - 16 18:15 
        









 Serum or plasma troponin i.cardiac measurement (mass/volume)            < ng/
mL            <0.30        









 Serum or plasma thyrotropin measurement by detection limit <=0.05 miu/l (units/
volume) - 16 18:15         









 Serum or plasma thyrotropin measurement by detection limit <=0.05 miu/l (units/
volume)            0.08 u[iU]/mL            0.35-4.94        









 Complete urinalysis with reflex to culture - 16 18:30         









 Urine color determination            YELLOW             NRG        

 

 Urine clarity determination            SLIGHTLY CLOUDY             NRG        

 

 Urine pH measurement by test strip            6             5-9        

 

 Specific gravity of urine by test strip            1.020             1.016-
1.022        

 

 Urine protein assay by test strip, semi-quantitative            1+             
NEGATIVE        

 

 Urine glucose detection by automated test strip            NEGATIVE           
  NEGATIVE        

 

 Erythrocytes detection in urine sediment by light microscopy            2+    
         NEGATIVE        

 

 Urine ketones detection by automated test strip            NEGATIVE           
  NEGATIVE        

 

 Urine nitrite detection by test strip            NEGATIVE             NEGATIVE
        

 

 Urine total bilirubin detection by test strip            NEGATIVE             
NEGATIVE        

 

 Urine urobilinogen measurement by automated test strip (mass/volume)          
  NORMAL             NORMAL        

 

 Urine leukocyte esterase detection by dipstick            3+             
NEGATIVE        

 

 Automated urine sediment erythrocyte count by microscopy (number/high power 
field)             [HPF]            NRG        

 

 Automated urine sediment leukocyte count by microscopy (number/high power field
)             [HPF]            NRG        

 

 Bacteria detection in urine sediment by light microscopy            FEW       
      NRG        

 

 Squamous epithelial cells detection in urine sediment by light microscopy     
       2-5             NRG        

 

 Crystals detection in urine sediment by light microscopy            NONE      
       NRG        

 

 Casts detection in urine sediment by light microscopy            NONE         
    NRG        

 

 Mucus detection in urine sediment by light microscopy            NEGATIVE     
        NRG        

 

 Complete urinalysis with reflex to culture            YES             NRG     
   









 Bacterial urine culture - 16 18:30         









 Bacterial urine culture            NG             NRG        









 Whole blood basic metabolic panel - 16 04:54         









 Serum or plasma sodium measurement (moles/volume)            135 mmol/L       
     135-145        

 

 Serum or plasma potassium measurement (moles/volume)            4.2 mmol/L    
        3.6-5.0        

 

 Serum or plasma chloride measurement (moles/volume)            104 mmol/L     
               

 

 Carbon dioxide            23 mmol/L            21-32        

 

 Serum or plasma anion gap determination (moles/volume)            8 mmol/L    
        5-14        

 

 Serum or plasma urea nitrogen measurement (mass/volume)            10 mg/dL   
         7-18        

 

 Serum or plasma creatinine measurement (mass/volume)            0.76 mg/dL    
        0.60-1.30        

 

 Serum or plasma urea nitrogen/creatinine mass ratio            13             
NRG        

 

 Serum or plasma creatinine measurement with calculation of estimated 
glomerular filtration rate            >             NRG        

 

 Serum or plasma glucose measurement (mass/volume)            109 mg/dL        
            

 

 Serum or plasma calcium measurement (mass/volume)            8.6 mg/dL        
    8.5-10.1        









 Serum or plasma lithium measurement (moles/volume) - 16 04:54         









 BNP level            426.4 pg/mL            <100.0        









 Complete blood count (CBC) with automated white blood cell (WBC) differential 
- 17 13:54         









 Blood leukocytes automated count (number/volume)            5.5 10*3/uL       
     4.3-11.0        

 

 Blood erythrocytes automated count (number/volume)            4.43 10*6/uL    
        4.35-5.85        

 

 Venous blood hemoglobin measurement (mass/volume)            14.4 g/dL        
    11.5-16.0        

 

 Blood hematocrit (volume fraction)            41 %            35-52        

 

 Automated erythrocyte mean corpuscular volume            93 [foz_us]          
  80-99        

 

 Automated erythrocyte mean corpuscular hemoglobin (mass per erythrocyte)      
      33 pg            25-34        

 

 Automated erythrocyte mean corpuscular hemoglobin concentration measurement (
mass/volume)            35 g/dL            32-36        

 

 Automated erythrocyte distribution width ratio            13.1 %            
10.0-14.5        

 

 Automated blood platelet count (count/volume)            158 10*3/uL          
  130-400        

 

 Automated blood platelet mean volume measurement            11.7 [foz_us]     
       7.4-10.4        

 

 Automated blood neutrophils/100 leukocytes            71 %            42-75   
     

 

 Automated blood lymphocytes/100 leukocytes            13 %            12-44   
     

 

 Blood monocytes/100 leukocytes            16 %            0-12        

 

 Automated blood eosinophils/100 leukocytes            0 %            0-10     
   

 

 Automated blood basophils/100 leukocytes            0 %            0-10        

 

 Blood neutrophils automated count (number/volume)            4.0 10*3         
   1.8-7.8        

 

 Blood lymphocytes automated count (number/volume)            0.7 10*3         
   1.0-4.0        

 

 Blood monocytes automated count (number/volume)            0.9 10*3            
0.0-1.0        

 

 Automated eosinophil count            0.0 10*3/uL            0.0-0.3        

 

 Automated blood basophil count (count/volume)            0.0 10*3/uL          
  0.0-0.1        









 Comprehensive metabolic panel - 17 13:54         









 Serum or plasma sodium measurement (moles/volume)            137 mmol/L       
     135-145        

 

 Serum or plasma potassium measurement (moles/volume)            2.7 mmol/L    
        3.6-5.0        

 

 Serum or plasma chloride measurement (moles/volume)            105 mmol/L     
               

 

 Carbon dioxide            20 mmol/L            21-32        

 

 Serum or plasma anion gap determination (moles/volume)            12 mmol/L   
         5-14        

 

 Serum or plasma urea nitrogen measurement (mass/volume)            14 mg/dL   
         7-18        

 

 Serum or plasma creatinine measurement (mass/volume)            0.65 mg/dL    
        0.60-1.30        

 

 Serum or plasma urea nitrogen/creatinine mass ratio            22             
NRG        

 

 Serum or plasma creatinine measurement with calculation of estimated 
glomerular filtration rate            >             NRG        

 

 Serum or plasma glucose measurement (mass/volume)            88 mg/dL         
           

 

 Serum or plasma calcium measurement (mass/volume)            6.7 mg/dL        
    8.5-10.1        

 

 Serum or plasma total bilirubin measurement (mass/volume)            0.3 mg/dL
            0.1-1.0        

 

 Serum or plasma alkaline phosphatase measurement (enzymatic activity/volume)  
          64 U/L                    

 

 Serum or plasma aspartate aminotransferase measurement (enzymatic activity/
volume)            19 U/L            5-34        

 

 Serum or plasma alanine aminotransferase measurement (enzymatic activity/volume
)            10 U/L            0-55        

 

 Serum or plasma protein measurement (mass/volume)            5.4 g/dL         
   6.4-8.2        

 

 Serum or plasma albumin measurement (mass/volume)            3.1 g/dL         
   3.2-4.5        









 Magnesium - 17 13:54         









 Magnesium            1.3 mg/dL            1.8-2.4        









 THYROID STIMULATING HORMONE - 17 13:54         









 THYROID STIMULATING HORMONE            0.03 u[iU]/mL            0.35-4.94     
   









 Serum or plasma thyroxine (T4) free measurement (mass/volume) - 17 13:54
         









 Serum or plasma thyroxine (T4) free measurement (mass/volume)            1.49 
ng/dL            0.70-1.48        









 Complete urinalysis with reflex to culture - 17 15:03         









 Urine color determination            YELLOW             NRG        

 

 Urine clarity determination            SLIGHTLY CLOUDY             NRG        

 

 Urine pH measurement by test strip            6             5-9        

 

 Specific gravity of urine by test strip            1.020             1.016-
1.022        

 

 Urine protein assay by test strip, semi-quantitative            3+             
NEGATIVE        

 

 Urine glucose detection by automated test strip            NEGATIVE           
  NEGATIVE        

 

 Erythrocytes detection in urine sediment by light microscopy            3+    
         NEGATIVE        

 

 Urine ketones detection by automated test strip            4+             
NEGATIVE        

 

 Urine nitrite detection by test strip            NEGATIVE             NEGATIVE
        

 

 Urine total bilirubin detection by test strip            NEGATIVE             
NEGATIVE        

 

 Urine urobilinogen measurement by automated test strip (mass/volume)          
  NORMAL             NORMAL        

 

 Urine leukocyte esterase detection by dipstick            NEGATIVE             
NEGATIVE        

 

 Automated urine sediment erythrocyte count by microscopy (number/high power 
field)             [HPF]            NRG        

 

 Automated urine sediment leukocyte count by microscopy (number/high power field
)             [HPF]            NRG        

 

 Bacteria detection in urine sediment by light microscopy            FEW       
      NRG        

 

 Squamous epithelial cells detection in urine sediment by light microscopy     
       10-25             NRG        

 

 Crystals detection in urine sediment by light microscopy            NONE      
       NRG        

 

 Casts detection in urine sediment by light microscopy            PRESENT      
       NRG        

 

 Mucus detection in urine sediment by light microscopy            MODERATE     
        NRG        

 

 Complete urinalysis with reflex to culture            YES             NRG     
   

 

 Hyaline casts detection in urine sediment by light microscopy            25-50
             NRG        









 Bacterial urine culture - 17 15:03         









 URINE CULTURE RESULTS            10,000/ML - 100,000/ML             NRG        









 Complete blood count (CBC) with automated white blood cell (WBC) differential 
- 17 06:16         









 Blood leukocytes automated count (number/volume)            4.5 10*3/uL       
     4.3-11.0        

 

 Blood erythrocytes automated count (number/volume)            4.26 10*6/uL    
        4.35-5.85        

 

 Venous blood hemoglobin measurement (mass/volume)            13.8 g/dL        
    11.5-16.0        

 

 Blood hematocrit (volume fraction)            40 %            35-52        

 

 Automated erythrocyte mean corpuscular volume            95 [foz_us]          
  80-99        

 

 Automated erythrocyte mean corpuscular hemoglobin (mass per erythrocyte)      
      32 pg            25-34        

 

 Automated erythrocyte mean corpuscular hemoglobin concentration measurement (
mass/volume)            34 g/dL            32-36        

 

 Automated erythrocyte distribution width ratio            13.5 %            
10.0-14.5        

 

 Automated blood platelet count (count/volume)            154 10*3/uL          
  130-400        

 

 Automated blood platelet mean volume measurement            11.7 [foz_us]     
       7.4-10.4        

 

 Automated blood neutrophils/100 leukocytes            64 %            42-75   
     

 

 Automated blood lymphocytes/100 leukocytes            17 %            12-44   
     

 

 Blood monocytes/100 leukocytes            19 %            0-12        

 

 Automated blood eosinophils/100 leukocytes            0 %            0-10     
   

 

 Automated blood basophils/100 leukocytes            0 %            0-10        

 

 Blood neutrophils automated count (number/volume)            2.9 10*3         
   1.8-7.8        

 

 Blood lymphocytes automated count (number/volume)            0.8 10*3         
   1.0-4.0        

 

 Blood monocytes automated count (number/volume)            0.9 10*3            
0.0-1.0        

 

 Automated eosinophil count            0.0 10*3/uL            0.0-0.3        

 

 Automated blood basophil count (count/volume)            0.0 10*3/uL          
  0.0-0.1        









 Whole blood basic metabolic panel - 17 06:16         









 Serum or plasma sodium measurement (moles/volume)            135 mmol/L       
     135-145        

 

 Serum or plasma potassium measurement (moles/volume)            3.7 mmol/L    
        3.6-5.0        

 

 Serum or plasma chloride measurement (moles/volume)            103 mmol/L     
               

 

 Carbon dioxide            23 mmol/L            21-32        

 

 Serum or plasma anion gap determination (moles/volume)            9 mmol/L    
        5-14        

 

 Serum or plasma urea nitrogen measurement (mass/volume)            16 mg/dL   
         7-18        

 

 Serum or plasma creatinine measurement (mass/volume)            0.84 mg/dL    
        0.60-1.30        

 

 Serum or plasma urea nitrogen/creatinine mass ratio            19             
NRG        

 

 Serum or plasma creatinine measurement with calculation of estimated 
glomerular filtration rate            >             NRG        

 

 Serum or plasma glucose measurement (mass/volume)            95 mg/dL         
           

 

 Serum or plasma calcium measurement (mass/volume)            8.1 mg/dL        
    8.5-10.1        









 Magnesium - 17 06:16         









 Magnesium            2.1 mg/dL            1.8-2.4        









 Blood manual differential performed detection - 17 06:16         









 Blood monocytes/100 leukocytes            10 %            NRG        

 

 Manual blood segmented neutrophils/100 leukocytes            77 %            
NRG        

 

 Blood band neutrophils/100 leukocytes            0 %            NRG        

 

 Manual blood lymphocytes/100 leukocytes            7 %            NRG        

 

 Manual eosinophils/100 leukocytes in nose            0 %            NRG        

 

 Manual blood basophils/100 leukocytes            0 %            NRG        

 

 Blood lymphocytes variant/100 leukocytes            6 %            NRG        

 

 Blood erythrocyte morphology finding identification            NORMAL         
    NRG        









 Cyanocobalamin measurement - 17 06:16         









 Vitamin B12            202 pg/mL            200-1000        









 Automated blood complete blood count (hemogram) panel - 17 07:12         









 Blood leukocytes automated count (number/volume)            3.1 10*3/uL       
     4.3-11.0        

 

 Blood erythrocytes automated count (number/volume)            3.73 10*6/uL    
        4.35-5.85        

 

 Venous blood hemoglobin measurement (mass/volume)            12.0 g/dL        
    11.5-16.0        

 

 Blood hematocrit (volume fraction)            37 %            35-52        

 

 Automated erythrocyte mean corpuscular volume            100 [foz_us]         
   80-99        

 

 Automated erythrocyte mean corpuscular hemoglobin (mass per erythrocyte)      
      32 pg            25-34        

 

 Automated erythrocyte mean corpuscular hemoglobin concentration measurement (
mass/volume)            32 g/dL            32-36        

 

 Automated erythrocyte distribution width ratio            13.9 %            
10.0-14.5        

 

 Automated blood platelet count (count/volume)            118 10*3/uL          
  130-400        

 

 Automated blood platelet mean volume measurement            11.4 [foz_us]     
       7.4-10.4        









 Comprehensive metabolic panel - 17 07:12         









 Serum or plasma sodium measurement (moles/volume)            139 mmol/L       
     135-145        

 

 Serum or plasma potassium measurement (moles/volume)            5.0 mmol/L    
        3.6-5.0        

 

 Serum or plasma chloride measurement (moles/volume)            110 mmol/L     
               

 

 Carbon dioxide            24 mmol/L            21-32        

 

 Serum or plasma anion gap determination (moles/volume)            5 mmol/L    
        5-14        

 

 Serum or plasma urea nitrogen measurement (mass/volume)            4 mg/dL    
        7-18        

 

 Serum or plasma creatinine measurement (mass/volume)            0.68 mg/dL    
        0.60-1.30        

 

 Serum or plasma urea nitrogen/creatinine mass ratio            6             
NRG        

 

 Serum or plasma creatinine measurement with calculation of estimated 
glomerular filtration rate            >             NRG        

 

 Serum or plasma glucose measurement (mass/volume)            86 mg/dL         
           

 

 Serum or plasma calcium measurement (mass/volume)            8.1 mg/dL        
    8.5-10.1        

 

 Serum or plasma total bilirubin measurement (mass/volume)            0.3 mg/dL
            0.1-1.0        

 

 Serum or plasma alkaline phosphatase measurement (enzymatic activity/volume)  
          58 U/L                    

 

 Serum or plasma aspartate aminotransferase measurement (enzymatic activity/
volume)            19 U/L            5-34        

 

 Serum or plasma alanine aminotransferase measurement (enzymatic activity/volume
)            11 U/L            0-55        

 

 Serum or plasma protein measurement (mass/volume)            5.3 g/dL         
   6.4-8.2        

 

 Serum or plasma albumin measurement (mass/volume)            3.3 g/dL         
   3.2-4.5        









 Complete urinalysis with reflex to culture - 17 16:00         









 Urine color determination            HUBERT             NRG        

 

 Urine clarity determination            SLIGHTLY CLOUDY             NRG        

 

 Urine pH measurement by test strip            7             5-9        

 

 Specific gravity of urine by test strip            1.010             1.016-
1.022        

 

 Urine protein assay by test strip, semi-quantitative            1+             
NEGATIVE        

 

 Urine glucose detection by automated test strip            NEGATIVE           
  NEGATIVE        

 

 Erythrocytes detection in urine sediment by light microscopy            
NEGATIVE             NEGATIVE        

 

 Urine ketones detection by automated test strip            NEGATIVE           
  NEGATIVE        

 

 Urine nitrite detection by test strip            NEGATIVE             NEGATIVE
        

 

 Urine total bilirubin detection by test strip            NEGATIVE             
NEGATIVE        

 

 Urine urobilinogen measurement by automated test strip (mass/volume)          
  1 mg/dL            NORMAL        

 

 Urine leukocyte esterase detection by dipstick            2+             
NEGATIVE        

 

 Automated urine sediment erythrocyte count by microscopy (number/high power 
field)            RARE             NRG        

 

 Automated urine sediment leukocyte count by microscopy (number/high power field
)             [HPF]            NRG        

 

 Bacteria detection in urine sediment by light microscopy            TRACE     
        NRG        

 

 Squamous epithelial cells detection in urine sediment by light microscopy     
       10-25             NRG        

 

 Crystals detection in urine sediment by light microscopy            NONE      
       NRG        

 

 Casts detection in urine sediment by light microscopy            NONE         
    NRG        

 

 Mucus detection in urine sediment by light microscopy            MODERATE     
        NRG        

 

 Complete urinalysis with reflex to culture            YES             NRG     
   









 Bacterial urine culture - 17 16:00         









 URINE CULTURE RESULTS            MORE THAN 3 ISOLATES             NRG        



                                                                    



Encounters

      





 ACCT No.            Visit Date/Time            Discharge            Status    
        Pt. Type            Provider            Facility            Loc./Unit  
          Complaint        

 

 [id number]            2013 15:52:00            2013 23:59:59          
  CLS            Outpatient            TARSHA STANLEY DDS                   
                            

 

 [id number]            2017 09:23:27            2017 23:59:59            
CLS            Outpatient                                                      
      

 

 [id number]            2017 16:00:00            2017 23:59:59    
        CLS            Outpatient            MOSES CAHIREZ MD            
[facility]                     

 

 [id number]            2017 16:31:00            02/10/2017 14:20:00    
        DIS            Inpatient            MOSES CHAIREZ MD            [facility]            4TH            HYPOKALEMIA,
HYPOMAGNESEMIA,UTI,DIZZINESS,THYROID        

 

 [id number]            2016 13:47:00            2016 23:59:59    
        CLS            Outpatient            STEPHANIE PENDLETON, ALON DAY    
        [facility]            CARD            CAD,CAROTID 
ARTERY STENOSIS,HLP,HTN        

 

 [id number]            2016 21:30:00            2016 11:30:00    
        DIS            Inpatient            YADIRA MATHUR MD            [facility]            4TH            UTI, INTRACTABLE N/V, 
GENERALIZED WEAKNESS        

 

 [id number]            2016 12:49:00            2016 23:59:59    
        CLS            Outpatient            JUMA RENE DO            [facility]            RT            COPD,DYSPNEA ON EFFORT   
     

 

 [id number]            2015 14:25:00            2015 13:06:00    
        DIS            Inpatient            YADIRA MATHUR MD            [facility]            SURGICAL            RLL PNEUMONIA 
HYPOKALEMIA        

 

 [id number]            2015 14:15:00            2015 23:59:59    
        CLS            Preadmit            ANDREIA DOLL, BLAKE REA            [facility]            RAD            HOARSNESS        

 

 [id number]            2013 09:32:00            2013 23:59:59    
        CLS            Outpatient            KALLI MORALES MD            [facility]            LAB            CAD,HYPERTENSION,
HYPERLIPIDEMIA        

 

 [id number]            2013 13:47:00            2013 23:59:59    
        CLS            Outpatient                                              
              

 

 [id number]            2013 15:30:00            2013 15:45:00    
        DIS            Inpatient            PETE MCBRIDE DO            
[facility]            SURGICAL            DISLOCATION R 
SHOULDER        

 

 [id number]            2016 22:09:00                                   
   Document Registration                                                       
     

 

 [id number]            2015 14:34:00                                   
   Document Registration                                                       
     

 

 [id number]            2013 10:18:00                                   
   Document Registration                                                       
     

 

 [id number]            02/10/2013 13:15:00                                   
   Document Registration                                                       
     

 

 [id number]            2013 16:22:00                                   
   Document Registration                                                       
     

 

 [id number]            2012 15:49:00                                   
   Document Registration                                                       
     

 

 [id number]            2011 09:31:00                                   
   Document Registration                                                       
     

 

 [id number]            2015 13:08:55                         [place]            
Document Registration

## 2018-04-28 NOTE — XMS REPORT
CCD document using C-CDA

 Created on: 2018



Omalley, Collette Y

External Reference #: [id number]

: 1931

Sex: Female



Demographics







 Address  [address]

[place]  [address]

 

 Home Phone  [phone]

 

 Preferred Language  English

 

 Marital Status  Unknown

 

 Cheondoism Affiliation  Unknown

 

 Race  White

 

 Ethnic Group  Not  or 





Author







 Author  Nora Hernández

 

 Organization  Nora Hernández MD, [facility]

 

 Address  [address] [place]  [address]



 

 Phone  [phone]







Care Team Providers







 Care Team Member Name  Role  Phone

 

  PP  Unavailable

 

  CCM  Unavailable



                                            



Summary Purpose

          Interface Exchange                                                   
                 



Insurance Providers

                      





 Payer name                    Policy type / Coverage type                    
Covered party ID                    Effective Begin Date                    
Effective End Date                

 

 [employer] CLAIMS                    Commercial Insurance                    
[id number]                    Unknown                    Unknown                



                                                                              



Family history

                      



Mother            





 Diagnosis                    Age At Onset                

 

 Hypertension                    Unknown                

 

 Arthritis                    Unknown                



            



Father            





 Diagnosis                    Age At Onset                

 

 Accident                    Unknown                



                                                                               
         



Social History

                      





 Social History Element                    Codes                    Description
                    Effective Dates                

 

 Marital status                    Unknown                              
          2017                

 

 Number of children                    Unknown                    3            
        2017                

 

 Employment                    Unknown                    Retired              
      2017                

 

 Tobacco history                    SNOMED CT: 2539585                    Quit 
over 10 years ago

Quit in 2017                

 

 Alcohol history                    SNOMED CT: 396151                    
Currently drinks alcohol

 7 drinks/week                    2017                



                                                                               
                                                 



Allergies, Adverse Reactions, Alerts

          Allergies, Adverse Reactions, Alerts data not found                  
                                                  



Past Medical History

                      





 Illness                    Codes                    Condition Status          
          Onset Date                    Resolved Date                

 

                     Abnormal weight loss                                      
ICD-9: 783.21

ICD-10: R63.4                    Active                    2018          
          Unknown                

 

                     Atrophy of thyroid (acquired)                             
         ICD-9: 244.8

ICD-10: E03.4                    Active                    2017          
          Unknown                

 

                     Essential (primary) hypertension                          
            ICD-9: 401.1

ICD-10: I10                    Active                    2017            
        Unknown                

 

                     Dysuria                                      ICD-9: 788.1

ICD-10: R30.0                    Active                    2017          
          Unknown                

 

                     Encounter for immunization                                
      ICD-9: V03.89

ICD-10: Z23                    Active                    2017            
        Unknown                

 

                     Mixed hyperlipidemia                                      
ICD-9: 272.2

ICD-10: E78.2                    Active                    2017          
          Unknown                

 

                     Hypertension                                      Unknown 
                   Active                    2017                    
Unknown                

 

                     Essential (primary) hypertension                          
            ICD-9: 401.9

ICD-10: I10                    Active                    2017            
        Unknown                

 

                     Chronic obstructive pulmonary disease, unspecified        
                              ICD-9: 496

ICD-10: J44.9                    Active                    2017          
          Unknown                

 

                     Hypokalemia                                      ICD-9: 
276.8

ICD-10: E87.6                    Active                    2017          
          Unknown                

 

                     Hypomagnesemia                                      ICD-9: 
275.2

ICD-10: E83.42                    Active                    2017         
           Unknown                

 

                     Hypothyroidism, unspecified                               
       ICD-9: 244.9

ICD-10: E03.9                    Active                    2017          
          Unknown                

 

                     Muscle weakness (generalized)                             
         ICD-9: 728.87

ICD-10: M62.81                    Active                    2017         
           Unknown                

 

                     Vitamin B12 deficiency anemia, unspecified                
                      ICD-9: 281.1

ICD-10: D51.9                    Active                    2017          
          Unknown                



                                                                               
                                                                               
                                                            



Problems

                      





 Condition                    Codes                    Effective Dates         
           Condition Status                

 

                     Abnormal weight loss                                      
ICD-9: 783.21

ICD-10: R63.4                    2018                    Active          
      

 

                     Atrophy of thyroid (acquired)                             
         ICD-9: 244.8

ICD-10: E03.4                    2017                    Active          
      

 

                     Essential (primary) hypertension                          
            ICD-9: 401.1

ICD-10: I10                    2017                    Active            
    

 

                     Dysuria                                      ICD-9: 788.1

ICD-10: R30.0                    2017                    Active          
      

 

                     Encounter for immunization                                
      ICD-9: V03.89

ICD-10: Z23                    2017                    Active            
    

 

                     Mixed hyperlipidemia                                      
ICD-9: 272.2

ICD-10: E78.2                    2017                    Active          
      

 

                     Hypertension                                      Unknown 
                   2017                    Active                

 

                     Essential (primary) hypertension                          
            ICD-9: 401.9

ICD-10: I10                    2017                    Active            
    

 

                     Chronic obstructive pulmonary disease, unspecified        
                              ICD-9: 496

ICD-10: J44.9                    2017                    Active          
      

 

                     Hypokalemia                                      ICD-9: 
276.8

ICD-10: E87.6                    2017                    Active          
      

 

                     Hypomagnesemia                                      ICD-9: 
275.2

ICD-10: E83.42                    2017                    Active         
       

 

                     Hypothyroidism, unspecified                               
       ICD-9: 244.9

ICD-10: E03.9                    2017                    Active          
      

 

                     Muscle weakness (generalized)                             
         ICD-9: 728.87

ICD-10: M62.81                    2017                    Active         
       

 

                     Vitamin B12 deficiency anemia, unspecified                
                      ICD-9: 281.1

ICD-10: D51.9                    2017                    Active          
      



                                                                               
                                                                               
                                                            



Medications

                      





 Medication                    Codes                    Instructions           
         Start Date                    Stop Date                    Status     
               Fill Instructions                

 

                     trazodone 50 mg tablet                                    
  RxNorm: 480022                    TAKE 1 TABLET BY MOUTH ONCE DAILY AT 
BEDTIME                    2018            
        Active                    Generic For:DESYREL 50 MG TABLET  2018 9
:28:33 AM                

 

                     levothyroxine 125 mcg tablet                              
        RxNorm: 837542                    1 Tablet(s) PO daily                 
   2017                    Active          
                          

 

                     Ventolin HFA 90 mcg/actuation aerosol inhaler             
                         RxNorm: 382968                    1-2 Puff(s) INH Q4 
PRN                    2017                    No Stop Date              
      Active                                    

 

                     trazodone 50 mg tablet                                    
  RxNorm: 223895                    TAKE 1 TABLET BY MOUTH ONCE DAILY AT 
BEDTIME                    2017            
        Inactive                    Generic For:DESYREL 50 MG TABLET  2017 9:09:05 AM                

 

                     Lipitor 10 mg tablet                                      
RxNorm: 863103                    1/2 Tablet(s) daily                    2018                    Active                   
                 

 

                     tolterodine 1 mg tablet                                   
   RxNorm: 127162                    1 Tablet(s) PO BID                    2017                    Inactive                
                    

 

                     Floranex 1 million cell tablet                            
          RxNorm:                     1 Tablet(s) PO AC                    2017                    Inactive                
                    

 

                     Lipitor 10 mg tablet                                      
RxNorm: 465270                    1 Tablet(s) PO QHS                    2017                    Inactive                 
                   

 

                     trazodone 50 mg tablet                                    
  RxNorm: 096265                    Take 1 Tablet (50 mg) by mouth daily at 
bedtime Patient needs to make an appt before any more refills will be given..  
                  2017                    
Inactive                    Generic For:DESYREL 50 MG TABLET  2017 8:57:
41 AM                

 

                     levothyroxine 125 mcg tablet                              
        RxNorm: 165011                    1 Tablet(s) PO daily                 
   2017                    Inactive        
                            

 

                     Floranex 1 million cell tablet                            
          RxNorm:                     1 Tablet(s) PO AC                    2017                    Inactive                
                    

 

                     Floranex 1 million cell tablet                            
          RxNorm:                     1 Tablet(s) PO AC                    2017                    Inactive                
                    

 

                     clopidogrel 75 mg tablet                                  
    RxNorm: 823091                    Tablet(s) PO daily                    2017                    Inactive              
                      

 

                     metoprolol tartrate 25 mg tablet                          
            RxNorm: 226224                    1 Tablet(s) PO BID               
     2017                    Inactive      
                              

 

                     pantoprazole 40 mg tablet,delayed release                 
                     RxNorm: 384046                    1 Tablet(s) PO daily    
                2017                    
Inactive                                    

 

                     enalapril maleate 10 mg tablet                            
          RxNorm: 380847                    1 Tablet(s) PO BID                 
   2017                    Inactive        
                            

 

                     montelukast 10 mg tablet                                  
    RxNorm: 659134                    1 Tablet(s) PO daily                    2017                    Inactive             
                       

 

                     promethazine 12.5 mg tablet                               
       RxNorm: 475776                    1 Tablet(s) PO Q8 PRN                 
   2017                    No Stop Date                    Active        
                            

 

                     verapamil  mg 24 hr capsule,extended release        
                              RxNorm: 414552                    1 Capsule(s) PO 
daily                    2017              
      Inactive                                    

 

                     trazodone 50 mg tablet                                    
  RxNorm: 035291                    1 Tablet(s) PO QHS                    2017                    Inactive                 
                   

 

                     levothyroxine 125 mcg tablet                              
        RxNorm: 652717                    1 Tablet(s) PO daily                 
   2017                    Inactive        
                            

 

                     Zofran ODT 4 mg disintegrating tablet                     
                 RxNorm: 558914                    1 Tablet(s) PO Q6 as needed 
                   2017                    
Inactive                                    

 

                     Lomotil 2.5 mg-0.025 mg tablet                            
          RxNorm: 9842600                    1 Tablet(s) PO Q6 as needed       
             2017                    
Inactive                                    

 

                     tolterodine 1 mg tablet                                   
   RxNorm: 157341                    1 Tablet(s) PO BID                    2017                    Inactive                
                    

 

                     Lipitor 10 mg tablet                                      
RxNorm: 688030                    1 Tablet(s) PO QHS                    2017                    Inactive                 
                   

 

                     Lomotil 2.5 mg-0.025 mg tablet                            
          RxNorm: 6587756                    1 Tablet(s) PO Q6 as needed       
             2017                    
Inactive                                    

 

                     Zofran ODT 4 mg disintegrating tablet                     
                 RxNorm: 755133                    1 Tablet(s) PO BID as needed
                    2017                    
Inactive                                    

 

                     Lipitor 10 mg tablet                                      
RxNorm: 649114                    1 Tablet(s) PO QHS                    2017                    Inactive                 
                   

 

                     tolterodine 1 mg tablet                                   
   RxNorm: 411360                    1 Tablet(s) PO BID                    2017                    Inactive                
                    

 

                     Vitamin B-12 1,000 mcg tablet                             
         RxNorm: 690180                    1 Tablet(s) PO daily                
    No Start Date                                         Active               
                     

 

                     meclizine 12.5 mg tablet                                  
    RxNorm: 698006                    1 Tablet(s) PO as needed dizziness       
             No Start Date                                         Active      
                              

 

                     Ventolin HFA 90 mcg/actuation aerosol inhaler             
                         RxNorm: 383992                    1-2 Puff(s) INH Q4 
PRN                    No Start Date                    2017             
       Inactive                                    



                                                                               
                                                                               
                                                                               
                                                                               
                                                                        



Medication Administered

          No Medication Administered data                                      
                              



Immunizations

                      





 Vaccine                    Codes                    Date                    
Status                

 

 Influenza                    CVX: 141                    2017           
         completed                



                                                                              



Assessments

                      





 Condition                    Codes                    Effective Dates         
       

 

 Atrophy of thyroid (acquired)                    ICD-10: E03.4

ICD-9: 244.8                    2018                

 

 Abnormal weight loss                    ICD-10: R63.4

ICD-9: 783.21                    2018                

 

 Essential (primary) hypertension                    ICD-10: I10

ICD-9: 401.1                    2018                

 

 Mixed hyperlipidemia                    ICD-10: E78.2

ICD-9: 272.2                    2017                

 

 Dysuria                    ICD-10: R30.0

ICD-9: 788.1                    2017                

 

 Encounter for immunization                    ICD-10: Z23

ICD-9: V03.89                    2017                

 

 Essential (primary) hypertension                    ICD-10: I10

ICD-9: 401.9                    2017                

 

 Hypomagnesemia                    ICD-10: E83.42

ICD-9: 275.2                    2017                

 

 Chronic obstructive pulmonary disease, unspecified                    ICD-10: 
J44.9

ICD-9: 496                    2017                

 

 Hypothyroidism, unspecified                    ICD-10: E03.9

ICD-9: 244.9                    2017                

 

 Hypokalemia                    ICD-10: E87.6

ICD-9: 276.8                    2017                

 

 Muscle weakness (generalized)                    ICD-10: M62.81

ICD-9: 728.87                    2017                

 

 Vitamin B12 deficiency anemia, unspecified                    ICD-10: D51.9

ICD-9: 281.1                    2017                



                                                                    



Reason For Visit

                      





 Reason For Visit                    Effective Dates                    Notes  
              

 

 hypothyroid                    2018                                    

 

 hypothyroid                    2017                                    

 

 dizziness                    2017                                    

 

 dizziness                    2017                                    



                                                                              



Results

                      





 Observation                    Observation Code                    Item       
             Item Code                    Result                    Date       
         

 

 Tsh                    Ord6                    TSH (3rd IS)                   
                     0.05 uIU/mL                    2018                

 

 Free T4                    Kxd570                    FREE T4                  
                      1.74 ng/dL                    2018                

 

 Comp Metabolic                    Fpe744                    NA                
                        138 mEq/L                    2018                

 

 Comp Metabolic                    Euy943                    K                 
                       4.2 mEq/L                    2018                

 

 Comp Metabolic                    Whu541                    CL                
                        100 mEq/L                    2018                

 

 Comp Metabolic                    Kmk842                    CO2               
                         29.0 mEq/L                    2018              
  

 

 Comp Metabolic                    Ekj913                    ANION GAP         
                               13                     2018                

 

 Comp Metabolic                    Vcm784                    GLUCOSE           
                             92 mg/dL                    2018            
    

 

 Comp Metabolic                    Qbi439                    Creat             
                           0.7 mg/dL                    2018             
   

 

 Comp Metabolic                    Sqj659                    eGFR              
                          81 ml/min/1.73m2                    2018       
         

 

 Comp Metabolic                    Rhq266                    BUN               
                         11 mg/dL                    2018                

 

 Comp Metabolic                    Ydo719                    B/C Ratio         
                               15.3 Ratio                    2018        
        

 

 Comp Metabolic                    Lnl712                    CALCIUM           
                             8.9 mg/dL                    2018           
     

 

 Comp Metabolic                    Rgn691                    ALK PHOS          
                              74 U/L                    2018             
   

 

 Comp Metabolic                    Zwo722                    AST(SGOT)         
                               12 U/L                    2018            
    

 

 Comp Metabolic                    Xqh911                    ALT(SGPT)         
                               8 U/L                    2018             
   

 

 Comp Metabolic                    Pbv493                    BILI T            
                            0.4 mg/dL                    2018            
    

 

 Comp Metabolic                    Ebe450                    ALBUMIN           
                             3.8 g/dL                    2018            
    

 

 Comp Metabolic                    Oxb775                    TPRO              
                          6.1 g/dL                    2018                

 

 Comp Metabolic                    Xmx503                    GLOB              
                          2.3 g/dL                    2018                

 

 Comp Metabolic                    Qfi304                    A/G Ratio         
                               1.7 Ratio                    2018         
       

 

 Comp Metabolic                    Uif767                    Osmo              
                          275 mOsmo                    2018              
  

 

 Cbc With Differential                    Ord2                    WBC          
                              7.08 K/ul                    08/10/2017          
      

 

 Cbc With Differential                    Ord2                    RBC          
                              4.23 M/ul                    08/10/2017          
      

 

 Cbc With Differential                    Ord2                    HGB          
                              14.0 g/dl                    08/10/2017          
      

 

 Cbc With Differential                    Ord2                    HCT          
                              40.2 %                    08/10/2017             
   

 

 Cbc With Differential                    Ord2                    Neut%        
                                52.4 %                    08/10/2017           
     

 

 Cbc With Differential                    Ord2                    MCV          
                              95.0 fl                    08/10/2017            
    

 

 Cbc With Differential                    Ord2                    Lymph%       
                                 32.5 %                    08/10/2017          
      

 

 Cbc With Differential                    Ord2                    MCH          
                              33.1 pg                    08/10/2017            
    

 

 Cbc With Differential                    Ord2                    Mono%        
                                10.3 %                    08/10/2017           
     

 

 Cbc With Differential                    Ord2                    Eos%         
                               4.7 %                    08/10/2017             
   

 

 Cbc With Differential                    Ord2                    MCHC         
                               34.8 pg                    08/10/2017           
     

 

 Cbc With Differential                    Ord2                    Baso%        
                                0.1 %                    08/10/2017            
    

 

 Cbc With Differential                    Ord2                    PLT          
                              249 K/ul                    08/10/2017           
     

 

 Cbc With Differential                    Ord2                    Neut ABS#    
                                    3.71 K/ul                    08/10/2017    
            

 

 Cbc With Differential                    Ord2                    RDW          
                              13.1 %                    08/10/2017             
   

 

 Cbc With Differential                    Ord2                    Lymph ABS#   
                                     2.30 K/ul                    08/10/2017   
             

 

 Cbc With Differential                    Ord2                    Mono ABS#    
                                    0.7 K/ul                    08/10/2017     
           

 

 Cbc With Differential                    Ord2                    Eos ABS#     
                                   0.3 K/ul                    08/10/2017      
          

 

 Cbc With Differential                    Ord2                    Baso ABS#    
                                    0.0 K/ul                    08/10/2017     
           

 

 Comp Metabolic                    Xwl595                    NA                
                        133 mEq/L                    08/10/2017                

 

 Comp Metabolic                    Zex448                    K                 
                       4.1 mEq/L                    08/10/2017                

 

 Comp Metabolic                    Iyo926                    CL                
                        95 mEq/L                    08/10/2017                

 

 Comp Metabolic                    Gew351                    CO2               
                         28.0 mEq/L                    08/10/2017              
  

 

 Comp Metabolic                    Sjm485                    ANION GAP         
                               14                     08/10/2017                

 

 Comp Metabolic                    Vtw412                    GLUCOSE           
                             85 mg/dL                    08/10/2017            
    

 

 Comp Metabolic                    Xtt831                    Creat             
                           0.7 mg/dL                    08/10/2017             
   

 

 Comp Metabolic                    Lkq601                    eGFR              
                          82 ml/min/1.73m2                    08/10/2017       
         

 

 Comp Metabolic                    Fyh740                    BUN               
                         8 mg/dL                    08/10/2017                

 

 Comp Metabolic                    Hgh360                    B/C Ratio         
                               11.1 Ratio                    08/10/2017        
        

 

 Comp Metabolic                    Ybk232                    CALCIUM           
                             8.9 mg/dL                    08/10/2017           
     

 

 Comp Metabolic                    Cub088                    ALK PHOS          
                              66 U/L                    08/10/2017             
   

 

 Comp Metabolic                    Axo747                    AST(SGOT)         
                               14 U/L                    08/10/2017            
    

 

 Comp Metabolic                    Xqk951                    ALT(SGPT)         
                               8 U/L                    08/10/2017             
   

 

 Comp Metabolic                    Nty157                    BILI T            
                            0.6 mg/dL                    08/10/2017            
    

 

 Comp Metabolic                    Wpl861                    ALBUMIN           
                             4.0 g/dL                    08/10/2017            
    

 

 Comp Metabolic                    Scx940                    TPRO              
                          6.3 g/dL                    08/10/2017                

 

 Comp Metabolic                    Ess828                    GLOB              
                          2.4 g/dL                    08/10/2017                

 

 Comp Metabolic                    Enl415                    A/G Ratio         
                               1.7 Ratio                    08/10/2017         
       

 

 Comp Metabolic                    Duh466                    Osmo              
                          264 mOsmo                    08/10/2017              
  

 

 Lipid                    Ord30                    CHOL                        
                126 mg/dL                    08/10/2017                

 

 Lipid                    Ord30                    HDL                         
               68.0 mg/dl                    08/10/2017                

 

 Lipid                    Ord30                    TRIG                        
                121 mg/dL                    08/10/2017                

 

 Lipid                    Ord30                    LDL                         
               34 mg/dL                    08/10/2017                

 

 Lipid                    Ord30                    C/HDL                       
                 1.9 Ratio                    08/10/2017                

 

 Free T4                    Jzu347                    FREE T4                  
                      1.52 ng/dL                    08/10/2017                

 

 Tsh                    Ord6                    hTSH II                        
                0.23 uIU/mL                    08/10/2017                

 

 Comp Metabolic                    Ika123                    NA                
                        136 mEq/L                    2017                

 

 Comp Metabolic                    Wzv695                    K                 
                       3.7 mEq/L                    2017                

 

 Comp Metabolic                    Pcu098                    CL                
                        97 mEq/L                    2017                

 

 Comp Metabolic                    Gfz898                    CO2               
                         31.0 mEq/L                    2017              
  

 

 Comp Metabolic                    Qzn280                    ANION GAP         
                               12                     2017                

 

 Comp Metabolic                    Qij222                    GLUCOSE           
                             99 mg/dL                    2017            
    

 

 Comp Metabolic                    Xdo717                    Creat             
                           0.9 mg/dL                    2017             
   

 

 Comp Metabolic                    Xcu734                    eGFR              
                          67 ml/min/1.73m2                    2017       
         

 

 Comp Metabolic                    Ojg282                    BUN               
                         11 mg/dL                    2017                

 

 Comp Metabolic                    Igi206                    B/C Ratio         
                               12.9 Ratio                    2017        
        

 

 Comp Metabolic                    Knb417                    CALCIUM           
                             8.8 mg/dL                    2017           
     

 

 Comp Metabolic                    Pps416                    ALK PHOS          
                              57 U/L                    2017             
   

 

 Comp Metabolic                    Xel364                    AST(SGOT)         
                               14 U/L                    2017            
    

 

 Comp Metabolic                    Tev032                    ALT(SGPT)         
                               9 U/L                    2017             
   

 

 Comp Metabolic                    Hro411                    BILI T            
                            0.6 mg/dL                    2017            
    

 

 Comp Metabolic                    Ses711                    ALBUMIN           
                             3.6 g/dL                    2017            
    

 

 Comp Metabolic                    Ram077                    TPRO              
                          5.9 g/dL                    2017                

 

 Comp Metabolic                    Cdq866                    GLOB              
                          2.3 g/dL                    2017                

 

 Comp Metabolic                    Zdc943                    A/G Ratio         
                               1.5 Ratio                    2017         
       

 

 Comp Metabolic                    Wfr632                    Osmo              
                          271 mOsmo                    2017              
  

 

 Tsh                    Ord6                    hTSH II                        
                2.20 uIU/mL                    2017                

 

 B12                    Sus333                    B12                          
              >1500.00 pg/ml                    2017                

 

 Cbc With Differential                    Ord2                    WBC          
                              9.12 K/ul                    2017          
      

 

 Cbc With Differential                    Ord2                    RBC          
                              4.00 M/ul                    2017          
      

 

 Cbc With Differential                    Ord2                    HGB          
                              13.2 g/dl                    2017          
      

 

 Cbc With Differential                    Ord2                    HCT          
                              39.4 %                    2017             
   

 

 Cbc With Differential                    Ord2                    Neut%        
                                62.9 %                    2017           
     

 

 Cbc With Differential                    Ord2                    MCV          
                              98.5 fl                    2017            
    

 

 Cbc With Differential                    Ord2                    Lymph%       
                                 23.4 %                    2017          
      

 

 Cbc With Differential                    Ord2                    MCH          
                              33.0 pg                    2017            
    

 

 Cbc With Differential                    Ord2                    Mono%        
                                10.9 %                    2017           
     

 

 Cbc With Differential                    Ord2                    MCHC         
                               33.5 pg                    2017           
     

 

 Cbc With Differential                    Ord2                    Eos%         
                               2.7 %                    2017             
   

 

 Cbc With Differential                    Ord2                    PLT          
                              276 K/ul                    2017           
     

 

 Cbc With Differential                    Ord2                    Baso%        
                                0.1 %                    2017            
    

 

 Cbc With Differential                    Ord2                    Neut ABS#    
                                    5.74 K/ul                    2017    
            

 

 Cbc With Differential                    Ord2                    RDW          
                              13.4 %                    2017             
   

 

 Cbc With Differential                    Ord2                    Lymph ABS#   
                                     2.13 K/ul                    2017   
             

 

 Cbc With Differential                    Ord2                    Mono ABS#    
                                    1.0 K/ul                    2017     
           

 

 Cbc With Differential                    Ord2                    Eos ABS#     
                                   0.3 K/ul                    2017      
          

 

 Cbc With Differential                    Ord2                    Baso ABS#    
                                    0.0 K/ul                    2017     
           

 

 Magnesium                    Ord90                    Mag                     
                   1.7 mg/dL                    2017                

 

 Urine Culture                    Ucult                    Complete            
                            NO Growth Day 2                     10/22/2016     
           

 

 Urine Culture                    Ucult                    Preliminary         
                               NO Growth Day 1                     10/22/2016  
              

 

 Urinalysis                    Ord28                    U-Color                
                        Orange-- Color May Affect Dipstick Results             
        10/20/2016                

 

 Urinalysis                    Ord28                    U-Clarity              
                          Cloudy                     10/20/2016                

 

 Urinalysis                    Ord28                    U-Gluc                 
                       250 mg/dL                     10/20/2016                

 

 Urinalysis                    Ord28                    U-Bili                 
                       Large                     10/20/2016                

 

 Urinalysis                    Ord28                    U-Ketone               
                         40 mg/dL                     10/20/2016                

 

 Urinalysis                    Ord28                    U-SG                   
                     <=1.005                     10/20/2016                

 

 Urinalysis                    Ord28                    U-Blood                
                        Negative                     10/20/2016                

 

 Urinalysis                    Ord28                    U-pH                   
                     5.0                     10/20/2016                

 

 Urinalysis                    Ord28                    U-Protein              
                          >=300 mg/dL                     10/20/2016           
     

 

 Urinalysis                    Ord28                    U-Urobilin             
                           >=8.0 E.U./dL E.U./dL                    10/20/2016 
               

 

 Urinalysis                    Ord28                    U-Nitrites             
                           Positive                     10/20/2016             
   

 

 Urinalysis                    Ord28                    U-Leuk                 
                       Large                     10/20/2016                

 

 Urinalysis                    Ord28                    U-Bact                 
                       1+                     10/20/2016                

 

 Urinalysis                    Ord28                    U-Squamous Epi         
                               0-5 per/HPF                    10/20/2016       
         

 

 Urinalysis                    Ord28                    U-Crystal              
                          None per/HPF                    10/20/2016           
     

 

 Urinalysis                    Ord28                    U-Mucus                
                        None                     10/20/2016                

 

 Urinalysis                    Ord28                    U-Renal tubular epi    
                                    None                     10/20/2016        
        

 

 Urinalysis                    Ord28                    U-RBC                  
                      5-10 per/HPF                    10/20/2016                

 

 Urinalysis                    Ord28                    U-Transitional epi     
                                   None per/HPF                    10/20/2016  
              

 

 Urinalysis                    Ord28                    U-WBC                  
                      TNTC per/HPF                    10/20/2016                

 

 Urinalysis                    Ord28                    U-Cast                 
                       None per/HPF                    10/20/2016              
  

 

 Urinalysis                    Ord28                    U-VOL                  
                      VOLUME INSUFFICIENT (<10mL) RESULTS MAY BE AFFECTED      
               10/20/2016                

 

 Urinalysis                    Ord28                    U-Yeast                
                        NEGATIVE                     10/20/2016                

 

 Urinalysis                    Ord28                    U-Com                  
                      Culture to follow                     10/20/2016         
       



                                                                               
                                                                               
                                                            



Review of Systems

                      





 System                    Result                    Effective Dates           
     

 

 Constitutional                    recent illness                    2018
                

 

 Constitutional                    No anorexia                    2018   
             

 

 Constitutional                    No chills                    2018     
           

 

 Constitutional                    No diaphoresis                    2018
                

 

 Constitutional                    No fever                    2018      
          

 

 Constitutional                    No insomnia                    2018   
             

 

 Constitutional                    No malaise                    2018    
            

 

 Eyes                    No eye discharge                    2018        
        

 

 Eyes                    No eye erythema                    2018         
       

 

 Ears/Nose/Throat/Neck                    No headache                    2018                

 

 Cardiovascular                    No chest pain/pressure                                    

 

 Respiratory                    No productive sputum                    2018                

 

 Respiratory                    No cigarette smoking                    2018                

 

 Respiratory                    cough                    2018            
    

 

 Respiratory                    dyspnea on exertion                    2018                

 

 Respiratory                    No dyspnea                    2018       
         

 

 Gastrointestinal                    No abdominal pain                    2018                

 

 Gastrointestinal                    No constipation                    2018                

 

 Gastrointestinal                    No diarrhea                    2018 
               

 

 Gastrointestinal                    nausea                    2018      
          

 

 Genitourinary/Nephrology                    No dysuria                                    

 

 Musculoskeletal                    back pain                    2018    
            

 

 Musculoskeletal                    No joint complaint                    2018                

 

 Psychiatric                    No anxiety                    2018       
         

 

 Psychiatric                    No depression                    2018    
            

 

 Endocrine                    No dry or coarse skin                    2018                

 

 Constitutional                    weight loss                    2018   
             

 

 Constitutional                    No fatigue                    2018    
            

 

 Dermatologic                    No rash                    2018         
       

 

 Dermatologic                    No sores                    2018        
        

 

 Constitutional                    No anorexia                    2017   
             

 

 Constitutional                    No night sweats                    2017                

 

 Constitutional                    No chills                    2017     
           

 

 Constitutional                    No diaphoresis                    2017
                

 

 Constitutional                    fatigue                    2017       
         

 

 Constitutional                    No fever                    2017      
          

 

 Constitutional                    No insomnia                    2017   
             

 

 Constitutional                    No malaise                    2017    
            

 

 Eyes                    No eye discharge                    2017        
        

 

 Eyes                    No eye erythema                    2017         
       

 

 Ears/Nose/Throat/Neck                    dizziness                    2017                

 

 Ears/Nose/Throat/Neck                    No headache                    2017                

 

 Cardiovascular                    No chest pain/pressure                                    

 

 Respiratory                    No productive sputum                    2017                

 

 Respiratory                    No cigarette smoking                    2017                

 

 Respiratory                    dyspnea on exertion                    2017                

 

 Respiratory                    No dyspnea                    2017       
         

 

 Gastrointestinal                    No abdominal pain                    2017                

 

 Gastrointestinal                    No constipation                    2017                

 

 Gastrointestinal                    No diarrhea                    2017 
               

 

 Genitourinary/Nephrology                    dysuria                    2017                

 

 Musculoskeletal                    back pain                    2017    
            

 

 Musculoskeletal                    No joint complaint                    2017                

 

 Dermatologic                    No rash                    2017         
       

 

 Neurologic                    No alteration of consciousness                  
  2017                

 

 Psychiatric                    No anxiety                    2017       
         

 

 Psychiatric                    No depression                    2017    
            

 

 Constitutional                    No recent illness                    2017                

 

 Neurologic                    memory loss                    2017       
         

 

 Constitutional                    No anorexia                    2017   
             

 

 Constitutional                    No night sweats                    2017                

 

 Constitutional                    No chills                    2017     
           

 

 Constitutional                    No diaphoresis                    2017
                

 

 Constitutional                    fatigue                    2017       
         

 

 Constitutional                    No fever                    2017      
          

 

 Constitutional                    No insomnia                    2017   
             

 

 Constitutional                    No malaise                    2017    
            

 

 Eyes                    No eye discharge                    2017        
        

 

 Eyes                    No eye erythema                    2017         
       

 

 Ears/Nose/Throat/Neck                    dizziness                    2017                

 

 Ears/Nose/Throat/Neck                    No headache                    2017                

 

 Cardiovascular                    No chest pain/pressure                                    

 

 Respiratory                    No productive sputum                    2017                

 

 Respiratory                    No cigarette smoking                    2017                

 

 Respiratory                    cough                    2017            
    

 

 Respiratory                    dyspnea on exertion                    2017                

 

 Respiratory                    No dyspnea                    2017       
         

 

 Gastrointestinal                    No abdominal pain                    2017                

 

 Gastrointestinal                    No constipation                    2017                

 

 Gastrointestinal                    No diarrhea                    2017 
               

 

 Gastrointestinal                    nausea                    2017      
          

 

 Genitourinary/Nephrology                    No dysuria                                    

 

 Musculoskeletal                    back pain                    2017    
            

 

 Musculoskeletal                    No joint complaint                    2017                

 

 Dermatologic                    No rash                    2017         
       

 

 Neurologic                    No alteration of consciousness                  
  2017                

 

 Psychiatric                    No anxiety                    2017       
         

 

 Psychiatric                    No depression                    2017    
            

 

 Constitutional                    recent illness                    2017
                

 

 Constitutional                    No anorexia                    2017   
             

 

 Constitutional                    No night sweats                    2017                

 

 Constitutional                    No chills                    2017     
           

 

 Constitutional                    No diaphoresis                    2017
                

 

 Constitutional                    fatigue                    2017       
         

 

 Constitutional                    No fever                    2017      
          

 

 Constitutional                    No insomnia                    2017   
             

 

 Constitutional                    No malaise                    2017    
            

 

 Constitutional                    No weight loss                    2017
                

 

 Constitutional                    No weight gain                    2017
                

 

 Eyes                    No eye discharge                    2017        
        

 

 Eyes                    No eye erythema                    2017         
       

 

 Ears/Nose/Throat/Neck                    dizziness                    2017                

 

 Ears/Nose/Throat/Neck                    No headache                    2017                

 

 Cardiovascular                    No chest pain/pressure                                    

 

 Respiratory                    No productive sputum                    2017                

 

 Respiratory                    cough                    2017            
    

 

 Respiratory                    No cigarette smoking                    2017                

 

 Respiratory                    No dyspnea                    2017       
         

 

 Respiratory                    dyspnea on exertion                    2017                

 

 Gastrointestinal                    No abdominal pain                    2017                

 

 Gastrointestinal                    No constipation                    2017                

 

 Gastrointestinal                    No diarrhea                    2017 
               

 

 Gastrointestinal                    nausea                    2017      
          

 

 Genitourinary/Nephrology                    No dysuria                                    

 

 Musculoskeletal                    No joint complaint                    2017                

 

 Dermatologic                    No rash                    2017         
       

 

 Neurologic                    No alteration of consciousness                  
  2017                

 

 Psychiatric                    No anxiety                    2017       
         

 

 Musculoskeletal                    back pain                    2017    
            

 

 Psychiatric                    No depression                    2017    
            

 

 Endocrine                    No dry or coarse skin                    2017                



                                                                    



Physical Exam

                      





 Exam Name                    System Name                    Item Name         
           Status                    Result                    Effective Dates 
                   Notes                

 

 Full Exam - General                     Constitutional                     
general appearance                    Overall:                    well 
developed                    2018                    None                

 

 Full Exam - General                     Constitutional                     
general appearance                    Overall:                    in no acute 
distress                    2018                    None                

 

 Full Exam - General                     Constitutional                     
general appearance                    Overall:                    well 
nourished                    2018                    None                

 

 Full Exam - General                     Eyes                    conjunctiva
/eyelids                    Overall:                    conjunctiva clear      
              2018                    None                

 

 Full Exam - General                     Eyes                    pupils and 
irises                    Overall:                    pupils equal, round, 
reactive to light and accomodation                    2018               
     None                

 

 Full Exam - General                     Ears/Nose/Throat                  
  otoscopic exam                    Overall:                    external 
auditory canals clear                    2018                    None    
            

 

 Full Exam - General                     Ears/Nose/Throat                  
  otoscopic exam                    Overall:                    tympanic 
membranes clear                    2018                    None          
      

 

 Full Exam - General                     Ears/Nose/Throat                  
  lips/teeth/gingiva                    Teeth:                    wears 
dentures                    2018                    None                

 

 Full Exam - General                     Ears/Nose/Throat                  
  oral cavity/pharynx/larynx                     Overall:                    
oral mucosa clear                    2018                    None        
        

 

 Full Exam - General                     Respiratory                    
auscultation                    Overall:                    breath sounds clear 
bilaterally                    2018                    None              
  

 

 Full Exam - General                     Respiratory                    
respiratory effort/rhythm                    Overall:                    no 
retractions                    2018                    None              
  

 

 Full Exam - General                     Respiratory                    
respiratory effort/rhythm                    Overall:                    normal 
rate                    2018                    None                

 

 Full Exam - General                     Cardiovascular                    
extremities                    Overall:                    no clubbing         
           2018                    None                

 

 Full Exam - General                     Cardiovascular                    
auscultation of heart                    Overall:                    regular 
rate                    2018                    None                

 

 Full Exam - General                     Cardiovascular                    
auscultation of heart                    Overall:                    normal 
heart sounds                    2018                    None             
   

 

 Full Exam - General                     Abdomen                    
abdominal exam                    Overall:                    no tenderness    
                2018                    None                

 

 Full Exam - General                     Abdomen                    
abdominal exam                    Overall:                    normal bowel 
sounds                    2018                    None                

 

 Full Exam - General                     Lymphatic                    neck 
nodes                    Overall:                    anterior cervical chain 
benign                    2018                    None                

 

 Full Exam - General                     Lymphatic                    neck 
nodes                    Overall:                    posterior cervical chain 
benign                    2018                    None                

 

 Full Exam - General                     Musculoskeletal                    
gait and station                    Station:                    kyphosis       
             2018                    None                

 

 Full Exam - General                     Integument                    
inspection of skin                    Overall:                    few scattered 
moles, no gross abnormalities                    2018                    
None                

 

 Full Exam - General                     Neurologic                    
cranial nerves                    Overall:                    crainial nerves 2 
- 12 grossly intact                    2018                    None      
          

 

 Full Exam - General                     Psychiatric                    
orientation/consciousness                    Overall:                    
oriented to person, place and time                    2018               
     None                

 

 Full Exam - General                     Constitutional                     
general appearance                    Overall:                    well 
developed                    2017                    None                

 

 Full Exam - General                     Constitutional                     
general appearance                    Overall:                    in no acute 
distress                    2017                    None                

 

 Full Exam - General                     Constitutional                     
general appearance                    Overall:                    well 
nourished                    2017                    None                

 

 Full Exam - General                     Eyes                    conjunctiva
/eyelids                    Overall:                    conjunctiva clear      
              2017                    None                

 

 Full Exam - General                     Eyes                    pupils and 
irises                    Overall:                    pupils equal, round, 
reactive to light and accomodation                    2017               
     None                

 

 Full Exam - General                     Ears/Nose/Throat                  
  otoscopic exam                    Overall:                    external 
auditory canals clear                    2017                    None    
            

 

 Full Exam - General                     Ears/Nose/Throat                  
  otoscopic exam                    Overall:                    tympanic 
membranes clear                    2017                    None          
      

 

 Full Exam - General                     Ears/Nose/Throat                  
  lips/teeth/gingiva                    Teeth:                    wears 
dentures                    2017                    None                

 

 Full Exam - General                     Ears/Nose/Throat                  
  oral cavity/pharynx/larynx                     Overall:                    
oral mucosa clear                    2017                    None        
        

 

 Full Exam - General                     Respiratory                    
auscultation                    Overall:                    breath sounds clear 
bilaterally                    2017                    None              
  

 

 Full Exam - General                     Respiratory                    
respiratory effort/rhythm                    Overall:                    no 
retractions                    2017                    None              
  

 

 Full Exam - General                     Respiratory                    
respiratory effort/rhythm                    Overall:                    normal 
rate                    2017                    None                

 

 Full Exam - General                     Cardiovascular                    
extremities                    Overall:                    no clubbing         
           2017                    None                

 

 Full Exam - General                     Cardiovascular                    
auscultation of heart                    Overall:                    regular 
rate                    2017                    None                

 

 Full Exam - General                     Cardiovascular                    
auscultation of heart                    Overall:                    normal 
heart sounds                    2017                    None             
   

 

 Full Exam - General                     Abdomen                    
abdominal exam                    Overall:                    no tenderness    
                2017                    None                

 

 Full Exam - General                     Abdomen                    
abdominal exam                    Overall:                    normal bowel 
sounds                    2017                    None                

 

 Full Exam - General                     Lymphatic                    neck 
nodes                    Overall:                    anterior cervical chain 
benign                    2017                    None                

 

 Full Exam - General                     Lymphatic                    neck 
nodes                    Overall:                    posterior cervical chain 
benign                    2017                    None                

 

 Full Exam - General                     Musculoskeletal                    
gait and station                    Station:                    kyphosis       
             2017                    None                

 

 Full Exam - General                     Neurologic                    
cranial nerves                    Overall:                    crainial nerves 2 
- 12 grossly intact                    2017                    None      
          

 

 Full Exam - General                     Psychiatric                    
orientation/consciousness                    Overall:                    
oriented to person, place and time                    2017               
     None                

 

 Full Exam - General                     Constitutional                     
general appearance                    Overall:                    well 
developed                    2017                    None                

 

 Full Exam - General                     Constitutional                     
general appearance                    Overall:                    in no acute 
distress                    2017                    None                

 

 Full Exam - General                     Constitutional                     
general appearance                    Overall:                    well 
nourished                    2017                    None                

 

 Full Exam - General                     Eyes                    conjunctiva
/eyelids                    Overall:                    conjunctiva clear      
              2017                    None                

 

 Full Exam - General                     Eyes                    pupils and 
irises                    Overall:                    pupils equal, round, 
reactive to light and accomodation                    2017               
     None                

 

 Full Exam - General                     Ears/Nose/Throat                  
  otoscopic exam                    Overall:                    external 
auditory canals clear                    2017                    None    
            

 

 Full Exam - General                     Ears/Nose/Throat                  
  otoscopic exam                    Overall:                    tympanic 
membranes clear                    2017                    None          
      

 

 Full Exam - General                     Ears/Nose/Throat                  
  lips/teeth/gingiva                    Teeth:                    wears 
dentures                    2017                    None                

 

 Full Exam - General                     Ears/Nose/Throat                  
  oral cavity/pharynx/larynx                     Overall:                    
oral mucosa clear                    2017                    None        
        

 

 Full Exam - General                     Respiratory                    
auscultation                    Overall:                    breath sounds clear 
bilaterally                    2017                    None              
  

 

 Full Exam - General                     Respiratory                    
respiratory effort/rhythm                    Overall:                    no 
retractions                    2017                    None              
  

 

 Full Exam - General                     Respiratory                    
respiratory effort/rhythm                    Overall:                    normal 
rate                    2017                    None                

 

 Full Exam - General                     Cardiovascular                    
extremities                    Overall:                    no clubbing         
           2017                    None                

 

 Full Exam - General                     Cardiovascular                    
auscultation of heart                    Overall:                    regular 
rate                    2017                    None                

 

 Full Exam - General                     Cardiovascular                    
auscultation of heart                    Overall:                    normal 
heart sounds                    2017                    None             
   

 

 Full Exam - General                     Abdomen                    
abdominal exam                    Overall:                    no tenderness    
                2017                    None                

 

 Full Exam - General                     Abdomen                    
abdominal exam                    Overall:                    normal bowel 
sounds                    2017                    None                

 

 Full Exam - General                     Lymphatic                    neck 
nodes                    Overall:                    anterior cervical chain 
benign                    2017                    None                

 

 Full Exam - General                     Lymphatic                    neck 
nodes                    Overall:                    posterior cervical chain 
benign                    2017                    None                

 

 Full Exam - General                     Neurologic                    
cranial nerves                    Overall:                    crainial nerves 2 
- 12 grossly intact                    2017                    None      
          

 

 Full Exam - General                     Psychiatric                    
orientation/consciousness                    Overall:                    
oriented to person, place and time                    2017               
     None                

 

 Full Exam - General                     Musculoskeletal                    
gait and station                    Station:                    kyphosis       
             2017                    None                

 

 Full Exam - General                     Constitutional                     
general appearance                    Overall:                    well 
developed                    2017                    None                

 

 Full Exam - General                     Constitutional                     
general appearance                    Overall:                    in no acute 
distress                    2017                    None                

 

 Full Exam - General                     Constitutional                     
general appearance                    Overall:                    well 
nourished                    2017                    None                

 

 Full Exam - General                     Psychiatric                    
orientation/consciousness                    Overall:                    
oriented to person, place and time                    2017               
     None                

 

 Full Exam - General                     Neurologic                    
cranial nerves                    Overall:                    crainial nerves 2 
- 12 grossly intact                    2017                    None      
          

 

 Full Exam - General                     Integument                    
inspection of skin                    Overall:                    few scattered 
moles, no gross abnormalities                    2017                    
None                

 

 Full Exam - General                     Musculoskeletal                    
gait and station                    Station:                    kyphosis       
             2017                    None                

 

 Full Exam - General                     Lymphatic                    neck 
nodes                    Overall:                    posterior cervical chain 
benign                    2017                    None                

 

 Full Exam - General                     Lymphatic                    neck 
nodes                    Overall:                    anterior cervical chain 
benign                    2017                    None                

 

 Full Exam - General                     Abdomen                    
abdominal exam                    Overall:                    normal bowel 
sounds                    2017                    None                

 

 Full Exam - General                     Abdomen                    
abdominal exam                    Overall:                    no tenderness    
                2017                    None                

 

 Full Exam - General                     Cardiovascular                    
auscultation of heart                    Overall:                    normal 
heart sounds                    2017                    None             
   

 

 Full Exam - General                     Cardiovascular                    
auscultation of heart                    Overall:                    regular 
rate                    2017                    None                

 

 Full Exam - General                     Cardiovascular                    
extremities                    Overall:                    no clubbing         
           2017                    None                

 

 Full Exam - General                     Respiratory                    
auscultation                    Overall:                    breath sounds clear 
bilaterally                    2017                    None              
  

 

 Full Exam - General                     Respiratory                    
respiratory effort/rhythm                    Overall:                    no 
retractions                    2017                    None              
  

 

 Full Exam - General                     Respiratory                    
respiratory effort/rhythm                    Overall:                    normal 
rate                    2017                    None                

 

 Full Exam - General                     Ears/Nose/Throat                  
  otoscopic exam                    Overall:                    external 
auditory canals clear                    2017                    None    
            

 

 Full Exam - General                     Ears/Nose/Throat                  
  otoscopic exam                    Overall:                    tympanic 
membranes clear                    2017                    None          
      

 

 Full Exam - General                     Ears/Nose/Throat                  
  oral cavity/pharynx/larynx                     Overall:                    
oral mucosa clear                    2017                    None        
        

 

 Full Exam - General                     Ears/Nose/Throat                  
  lips/teeth/gingiva                    Teeth:                    wears 
dentures                    2017                    None                

 

 Full Exam - General                     Eyes                    conjunctiva
/eyelids                    Overall:                    conjunctiva clear      
              2017                    None                

 

 Full Exam - General                     Eyes                    pupils and 
irises                    Overall:                    pupils equal, round, 
reactive to light and accomodation                    2017               
     None                



                                                                              



Procedures

                      





 Procedure                    Codes                    Date                

 

                     URINALYSIS NONAUTO W/O SCOPE                              
        CPT-4: 87056                    2017                

 

                     FLU VACC PRSV FREE INC ANTIG                              
        CPT-4: 32267                    2017                

 

                     IMMUNIZATION ADMIN                                      CPT
-4: 72512                    2017                

 

                     THER/PROPH/DIAG INJ SC/IM                                 
     CPT-4: 17929                    2017                

 

                     VITAMIN B12 INJECTION                                      
CPT-4:                     2017                



                                                                               
                                       



Vital Signs

                      





 Date                    Vital                









 2018                                        Blood Pressure 1: 120/74 Code
: 8480-6                                                          BMI: 20.3 Code
: 58231-0                                                          Heart Rate 1
: 66 bpm                                                          Height: 5'2" 
                                                         SpO2: 93%             
                                             Weight: 111 lbs                   
                

 

 2017                                        Blood Pressure 1: 130/78 Code
: 8480-6                                                          BMI: 21.2 Code
: 54490-6                                                          Heart Rate 1
: 52 bpm                                                          Height: 5'2" 
                                                         SpO2: 92%             
                                             Weight: 116 lbs                   
                

 

 2017                                        Blood Pressure 1: 138/80 Code
: 8480-6                                                          BMI: 21.4 Code
: 12408-7                                                          Heart Rate 1
: 65 bpm                                                          Height: 5'2" 
                                                         SpO2: 92%             
                                             Weight: 117 lbs                   
                









 2017                                        Blood Pressure 1: 118/68 Code
: 8480-6                                                          BMI: 21.4 Code
: 16697-5                                                          Heart Rate 1
: 62 bpm                                                          Height: 5'2" 
                                                         SpO2: 92%             
                                             Temperature: 37.7 (C) / 99.8 (F)  
                                                        Weight: 117 lbs        
                           



                                                                               
                                       



Functional Status

          No Functional Status data                                            
              



History of Present Illness

                      





 Symptom Name                    Status                    Result              
      Effective Date                    Notes                

 

 hypothyroid                    Onset and Resolution                    ongoing
                    2018                    None                

 

 hypothyroid                    Alleviating Factors                    
medication                    2018                    None                

 

 hypertension                    Quality                    primary 
hypertension                    2018                    None             
   

 

 hypertension                    Onset and Resolution                    
ongoing                    2018                    None                

 

 hypertension                    Onset of Symptom                    during 
adulthood                    2018                    None                

 

 hypertension                    Blood Pressure Values                    not 
checking blood pressure at home                    2018                  
  None                

 

 hypertension                    Alleviating Factors                    
medication                    2018                    None                

 

 hypertension                    Pertinent Findings                    
decreased energy                    2018                    None         
       

 

 hypertension                    Pertinent Findings                    Denies 
dizziness                    2018                    None                

 

 hypertension                    Pertinent Findings                    dyspnea 
                   2018                    "not more than usual"- has 
oxygen at home- uses as needed                

 

 hypertension                    Pertinent Findings                    Denies 
edema                    2018                    None                

 

 hypertension                    Pertinent Findings                    Denies 
anxiety                    2018                    None                

 

 hypertension                    Onset and Resolution                    
ongoing                    2017                    None                

 

 hypertension                    Onset of Symptom                    during 
adulthood                    2017                    None                

 

 hypertension                    Blood Pressure Values                    not 
checking blood pressure at home                    2017                  
  None                

 

 hypertension                    Alleviating Factors                    
medication                    2017                    None                

 

 hypertension                    Pertinent Findings                    
decreased energy                    2017                    None         
       

 

 hypertension                    Pertinent Findings                    Denies 
dizziness                    2017                    None                

 

 hypertension                    Pertinent Findings                    dyspnea 
                   2017                    "not more than usual"- has 
oxygen at home- uses as needed                 

 

 hypertension                    Pertinent Findings                    Denies 
edema                    2017                    None                

 

 hypertension                    Quality                    primary 
hypertension                    2017                    None             
   

 

 hypothyroid                    Onset and Resolution                    ongoing
                    2017                    None                

 

 hypothyroid                    Alleviating Factors                    
medication                    2017                    None                

 

 dysuria                    Quality                    acute                    
2017                    None                

 

 dysuria                    Quality                    intermittent            
        2017                    None                

 

 dysuria                    Onset of Symptom                    ~2-3 weeks ago 
                   2017                    None                

 

 dysuria                    Pertinent Findings                    bladder pain 
                   2017                    None                

 

 dizziness                    Quality                    chronic               
     2017                    None                

 

 dizziness                    Quality                    imbalance             
       2017                    None                

 

 dizziness                    Quality                    intermittent          
          2017                    None                

 

 dizziness                    Quality                    sense of motion       
             2017                    None                

 

 dizziness                    Quality                    sense of room spinning
                    2017                    None                

 

 dizziness                    Quality                    spinning              
      2017                    None                

 

 dizziness                    Onset and Resolution                    ongoing  
                  2017                    None                

 

 dizziness                    Significant Family History                    
hypertension                    2017                    None             
   

 

 dizziness                    Pertinent Findings                    
lightheadedness                    2017                    None          
      

 

 dizziness                    Pertinent Findings                    problems 
with coordination                    2017                    None        
        

 

 weakness                    Quality                    upper extremities      
              2017                    None                

 

 weakness                    Quality                    lower extremities      
              2017                    None                

 

 weakness                    Quality                    decreased energy       
             2017                    None                

 

 weakness                    Quality                    generalized fatigue    
                2017                    None                

 

 weakness                    Quality                    muscle weakness        
            2017                    None                

 

 weakness                    Alleviating Factors                    rest       
             2017                    None                

 

 weakness                    Exacerbating Factors                    exertion  
                  2017                    None                

 

 weakness                    Exacerbating Factors                    activity  
                  2017                    None                

 

 weakness                    Pertinent Findings                    gait 
abnormality                    2017                    None              
  

 

 weakness                    Pertinent Findings                    respiratory 
difficulties                    2017                    (has emphysema)  
               

 

 weakness                    Pertinent Findings                    sensory 
changes                    2017                    (intermittent dizziness
)                 

 

 hypertension                    Onset and Resolution                    
ongoing                    2017                    None                

 

 hypertension                    Onset of Symptom                    during 
adulthood                    2017                    None                

 

 hypertension                    Alleviating Factors                    
medication                    2017                    None                

 

 hypertension                    Pertinent Findings                    
decreased energy                    2017                    --improved   
              

 

 hypertension                    Pertinent Findings                    
dizziness                    2017                    --intermittent      
           

 

 hypertension                    Pertinent Findings                    dyspnea 
                   2017                    --intermittent- has emphysema 
                

 

 hypertension                    Pertinent Findings                    Denies 
edema                    2017                    None                

 

 hypertension                    Pertinent Findings                    muscle 
weakness                    2017                    None                

 

 hypertension                    Blood Pressure Values                    not 
checking blood pressure at home                    2017                  
  None                

 

 weakness                    Quality                    improving              
      2017                    None                

 

 weakness                    Onset and Resolution                    ongoing   
                 2017                    None                

 

 dizziness                    Quality                    chronic               
     2017                    None                

 

 dizziness                    Quality                    imbalance             
       2017                    None                

 

 dizziness                    Quality                    intermittent          
          2017                    None                

 

 dizziness                    Quality                    sense of motion       
             2017                    None                

 

 dizziness                    Quality                    sense of room spinning
                    2017                    None                

 

 dizziness                    Quality                    spinning              
      2017                    None                

 

 dizziness                    Onset and Resolution                    ongoing  
                  2017                    None                

 

 dizziness                    Pertinent Findings                    
lightheadedness                    2017                    None          
      

 

 dizziness                    Pertinent Findings                    problems 
with coordination                    2017                    None        
        

 

 dizziness                    Significant Family History                    
hypertension                    2017                    None             
   

 

 dizziness                    Significant Medical Conditions                    
illness                    2017                    None                

 

 weakness                    Quality                    upper extremities      
              2017                    None                

 

 weakness                    Quality                    lower extremities      
              2017                    None                

 

 weakness                    Quality                    generalized fatigue    
                2017                    None                

 

 weakness                    Quality                    decreased energy       
             2017                    None                

 

 weakness                    Quality                    muscle weakness        
            2017                    None                

 

 weakness                    Onset and Resolution                    sudden in 
onset                    2017                    after hospital stays    
            

 

 weakness                    Alleviating Factors                    rest       
             2017                    None                

 

 weakness                    Exacerbating Factors                    exertion  
                  2017                    None                

 

 weakness                    Exacerbating Factors                    activity  
                  2017                    None                

 

 weakness                    Exacerbating Factors                    stress    
                2017                    None                

 

 weakness                    Pertinent Findings                    gait 
abnormality                    2017                    None              
  

 

 weakness                    Pertinent Findings                    respiratory 
difficulties                    2017                    None             
   

 

 weakness                    Pertinent Findings                    sensory 
changes                    2017                    dizziness             
   

 

 hypertension                    Onset and Resolution                    
gradual in onset                    2017                    None         
       

 

 hypertension                    Onset and Resolution                    
ongoing                    2017                    None                

 

 hypertension                    Onset of Symptom                    during 
adulthood                    2017                    None                

 

 hypertension                    Alleviating Factors                    
medication                    2017                    None                

 

 hypertension                    Pertinent Findings                    
dizziness                    2017                    None                

 

 hypertension                    Pertinent Findings                    dyspnea 
                   2017                    None                

 

 hypertension                    Pertinent Findings                    Denies 
edema                    2017                    None                

 

 hypertension                    Pertinent Findings                    
decreased energy                    2017                    None         
       

 

 hypertension                    Pertinent Findings                    lethargy
                    2017                    None                

 

 hypertension                    Pertinent Findings                    muscle 
weakness                    2017                    None                

 

 hyperlipidemia                    Onset of Symptom                    during 
adulthood                    2017                    None                

 

 hyperlipidemia                    Frequency of Episodes                    
unchanged                    2017                    None                

 

 hyperlipidemia                    Onset and Resolution                    
ongoing                    2017                    None                

 

 hyperlipidemia                    Pertinent Findings                    Denies 
edema                    2017                    None                

 

 hyperlipidemia                    Pertinent Findings                    fever 
                   2017                    None                



                                                                    



Advance Directives

          No Advance Directive data                                            
                        



Encounters

                      





 Encounter                    Performer                    Location            
        Codes                    Date                

 

                     [phone] EST. PATIENT, LEVEL IV

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

Diagnosis: Essential (primary) hypertension[ICD10: I10]

Diagnosis: Abnormal weight loss[ICD10: R63.4]                                  
    [facility]                    
CPT-4: 79293                    2018                

 

                     [phone] EST. PATIENT, LEVEL IV

Diagnosis: Essential (primary) hypertension[ICD10: I10]

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

Diagnosis: Mixed hyperlipidemia[ICD10: E78.2]

Diagnosis: Dysuria[ICD10: R30.0]

Diagnosis: Encounter for immunization[ICD10: Z23]                              
        [facility] Hern[facility]dez [facility], [facility]               
     CPT-4: 86508                    2017                

 

                     [phone] EST. PATIENT, LEVEL IV

Diagnosis: Essential (primary) hypertension[ICD10: I10]

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                         
             Nora Hernández MD, [facility]          
          CPT-4: 75527                    2017                

 

                     ([id number] EST. PATIENT, LEVEL IV

Diagnosis: Essential (primary) hypertension[ICD10: I10]

Diagnosis: Muscle weakness (generalized)[ICD10: M62.81]

Diagnosis: Hypokalemia[ICD10: E87.6]

Diagnosis: Vitamin B12 deficiency anemia, unspecified[ICD10: D51.9]

Diagnosis: Hypomagnesemia[ICD10: E83.42]

Diagnosis: Hypothyroidism, unspecified[ICD10: E03.9]

Diagnosis: Chronic obstructive pulmonary disease, unspecified[ICD10: J44.9]    
                                  Flor Hernández MD, [facility]                    CPT-4: [id number]                    2017 
               



                                                                               
                                                                     



Plan of Care

                      





 Planned Activity                    Notes                    Codes            
        Status                    Date                

 

 Visit Plan:                     Hypertension - well controlled - continue with 
current medications, continue with no added salt diet. Pt has been encouraged 
to exercise daily. The pt has been advised to call the office if there are any 
acute concerns about change in blood pressure readings at home. Hypothyroidism 
- pt with chronic hypothyroidism, continue with current medication, will 
monitor pt to signs or symptoms of lack of adequate supplementation. Pt is to 
continue with current dose of medication unless directed otherwise. Check labs 
at regular intervals wither q 3 months or q 6 months based on previous levels 
of control. Underweight - check weight at home, monitor weight weekly and call 
over the next month - if continues to loose weight - may need to consider pt to 
have appetite stimulant.

                                                             2018        
        

 

 Patient Education: Patient Medication Summary                                 
                            Completed                    2018            
    

 

 Appointment: [facility] 

WPtel:[phone] [address] [facility]

                                                             (15 min) 
Moderate                    2018                

 

 Visit Plan:                     Hypertension - well controlled - continue with 
current medications, continue with no added salt diet. Pt has been encouraged 
to exercise daily. The pt has been advised to call the office if there are any 
acute concerns about change in blood pressure readings at home. Hyperlipidemia 
- pt has been counseled about appropriate diet, exercise, and need for low fat 
food choices. Due to increase in muscle weakness/aches, and low LDL of 34 - I 
have recommended a decrease of the lipitor dose to 5mg daily. Hypothyroidism - 
pt with chronic hypothyroidism, continue with current medication, will monitor 
pt to signs or symptoms of lack of adequate supplementation. Pt is to continue 
with current dose of medication unless directed otherwise. Check labs at 
regular intervals wither q 3 months or q 6 months based on previous levels of 
control. Dysuria - due to vaginal dryness, pt to use wet wipes and aloe toilet 
paper, if not improving, she is to let the office know. UA was negative for 
infection

                                                             2017        
        

 

 Visit Plan:                     Hypertension - well controlled - continue with 
current medications, continue with no added salt diet. Pt has been encouraged 
to exercise daily. The pt has been advised to call the office if there are any 
acute concerns about change in blood pressure readings at home. Hyperlipidemia 
- pt has been counseled about appropriate diet, exercise, and need for low fat 
food choices. Due to increase in muscle weakness/aches, and low LDL of 34 - I 
have recommended a decrease of the lipitor dose to 5mg daily. Hypothyroidism - 
pt with chronic hypothyroidism, continue with current medication, will monitor 
pt to signs or symptoms of lack of adequate supplementation. Pt is to continue 
with current dose of medication unless directed otherwise. Check labs at 
regular intervals wither q 3 months or q 6 months based on previous levels of 
control. Dysuria - due to vaginal dryness, pt to use wet wipes and aloe toilet 
paper, if not improving, she is to let the office know. UA was negative for 
infection

                                                             2017        
        

 

 Appointment: Nora Hernández 

WPtel:[phone] [address] [facility]

[place]                                                             (15 min) 
Moderate                    2017                

 

 Patient Education: Patient Medication Summary                                 
                            Completed                    2017            
    

 

 Appointment: Nora Hernández 

WPtel:[phone]

 [address] [facility]

[place]                                                             (15 min) 
Moderate                    2017                

 

 Visit Plan:                     Hypertension - well controlled - continue with 
current medications, continue with no added salt diet. Pt has been encouraged 
to exercise daily. The pt has been advised to call the office if there are any 
acute concerns about change in blood pressure readings at home. Hypothyroidism 
- pt with chronic hypothyroidism, continue with current medication, will 
monitor pt to signs or symptoms of lack of adequate supplementation. Pt is to 
continue with current dose of medication unless directed otherwise. Check labs 
at regular intervals wither q 3 months or q 6 months based on previous levels 
of control.

                                                             2017        
        

 

 Appointment: Nora Hernández 

WPtel:[phone] [address] [facility]

                                                             (15 min) 
Moderate                    2017                

 

 Patient Education: Patient Medication Summary                                 
                            Completed                    2017            
    

 

 Patient Education: Hypertension                                               
              Completed                    2017                

 

 Visit Plan:                     Hypertension - well controlled - continue with 
current medications, continue with no added salt diet. Pt has been encouraged 
to exercise daily. The pt has been advised to call the office if there are any 
acute concerns about change in blood pressure readings at home. Low potassium-
magnesium-check labs today B12 deficiency-injection today in the office-check 
labs Generalized weakness-continue home health Hypothyroidism-not sure if dose 
was adjusted from the hospital-check dose and call the office

                                                             2017        
        

 

 Visit Plan:                     Hypertension - well controlled - continue with 
current medications, continue with no added salt diet. Pt has been encouraged 
to exercise daily. The pt has been advised to call the office if there are any 
acute concerns about change in blood pressure readings at home. Low potassium-
magnesium-check labs today B12 deficiency-injection today in the office-check 
labs Generalized weakness-continue home health Hypothyroidism-not sure if dose 
was adjusted from the hospital-check dose and call the office

                                                             2017        
        

 

 Appointment: Flor Pavon 

WPtel:[phone]

 [address] [facility]

                                                             New Patient     
               2017                

 

 Patient Education: Patient Medication Summary                                 
                            Completed                    2017            
    



                                                                               
                                                                               
                              



Instructions

                      





 Comment                

 

                     fasting labs before next appt. Hypertension - well 
controlled - continue with current medications, continue with no added salt 
diet.  Pt has been encouraged to exercise daily.

The pt has been advised to call the office if there are any acute concerns 
about change in blood pressure readings at home.



Hypothyroidism - pt with chronic hypothyroidism, continue with current 
medication, will monitor pt to signs or symptoms of lack of adequate 
supplementation.  Pt is to continue with current dose of medication unless 
directed otherwise.  Check labs at regular intervals wither q 3 months or q 6 
months based on previous levels of control.

                                  

 

                     . Hypertension - well controlled - continue with current 
medications, continue with no added salt diet.  Pt has been encouraged to 
exercise daily.

The pt has been advised to call the office if there are any acute concerns 
about change in blood pressure readings at home.



Hypothyroidism - pt with chronic hypothyroidism, continue with current 
medication, will monitor pt to signs or symptoms of lack of adequate 
supplementation.  Pt is to continue with current dose of medication unless 
directed otherwise.  Check labs at regular intervals wither q 3 months or q 6 
months based on previous levels of control.



Underweight - check weight at home, monitor weight weekly and call over the 
next month - if continues to loose weight - may need to consider pt to have 
appetite stimulant.                                  

 

                     decrease the atorvastatin to 1/2 pill daily.

 . Hypertension - well controlled - continue with current medications, continue 
with no added salt diet.  Pt has been encouraged to exercise daily.

The pt has been advised to call the office if there are any acute concerns 
about change in blood pressure readings at home.



Hyperlipidemia -  pt has been counseled about appropriate diet, exercise, and 
need for low fat food choices. Due to increase in muscle weakness/aches, and 
low LDL of 34 - I have recommended a decrease of the lipitor dose to 5mg daily.
  



Hypothyroidism - pt with chronic hypothyroidism, continue with current 
medication, will monitor pt to signs or symptoms of lack of adequate 
supplementation.  Pt is to continue with current dose of medication unless 
directed otherwise.  Check labs at regular intervals wither q 3 months or q 6 
months based on previous levels of control.







Dysuria - due to vaginal dryness, pt to use wet wipes and aloe toilet paper, if 
not improving, she is to let the office know.

UA was negative for infection

                                  

 

                     decrease the atorvastatin to 1/2 pill daily.

 . Hypertension - well controlled - continue with current medications, continue 
with no added salt diet.  Pt has been encouraged to exercise daily.

The pt has been advised to call the office if there are any acute concerns 
about change in blood pressure readings at home.



Hyperlipidemia -  pt has been counseled about appropriate diet, exercise, and 
need for low fat food choices. Due to increase in muscle weakness/aches, and 
low LDL of 34 - I have recommended a decrease of the lipitor dose to 5mg daily.
  



Hypothyroidism - pt with chronic hypothyroidism, continue with current 
medication, will monitor pt to signs or symptoms of lack of adequate 
supplementation.  Pt is to continue with current dose of medication unless 
directed otherwise.  Check labs at regular intervals wither q 3 months or q 6 
months based on previous levels of control.







Dysuria - due to vaginal dryness, pt to use wet wipes and aloe toilet paper, if 
not improving, she is to let the office know.

UA was negative for infection

                                  

 

                     CHECK THYROID DOSE AND LET US KNOW WHAT DOSE SHE IS TAKING 



B12 injection today in the office 



CHECK LABS 



PROMETHAZINE AS NEEDED FOR NAUSEA



 . Hypertension - well controlled - continue with current medications, continue 
with no added salt diet.  Pt has been encouraged to exercise daily.

The pt has been advised to call the office if there are any acute concerns 
about change in blood pressure readings at home.



Low potassium-magnesium-check labs today 



B12 deficiency-injection today in the office-check labs 



Generalized weakness-continue home health 



Hypothyroidism-not sure if dose was adjusted from the hospital-check dose and 
call the office  

                                  

 

                     CHECK THYROID DOSE AND LET US KNOW WHAT DOSE SHE IS TAKING 



B12 injection today in the office 



CHECK LABS 



PROMETHAZINE AS NEEDED FOR NAUSEA



 . Hypertension - well controlled - continue with current medications, continue 
with no added salt diet.  Pt has been encouraged to exercise daily.

The pt has been advised to call the office if there are any acute concerns 
about change in blood pressure readings at home.



Low potassium-magnesium-check labs today 



B12 deficiency-injection today in the office-check labs 



Generalized weakness-continue home health 



Hypothyroidism-not sure if dose was adjusted from the hospital-check dose and 
call the office

## 2018-04-28 NOTE — XMS REPORT
Continuity of Care Document

 Created on: 2018



OMALLEY, COLLETTE Y

External Reference #: [id number]

: 1931

Sex: Female



Demographics







 Address  [address] [place]  [address]

 

 Home Phone  [phone] x

 

 Preferred Language  Unknown

 

 Marital Status  Unknown

 

 Orthodox Affiliation  Unknown

 

 Race  Unknown

 

 Ethnic Group  Unknown





Author







 Author  [facility]

 

 Address  Unknown

 

 Phone  Unavailable



              



Allergies

      





 Active            Description            Code            Type            
Severity            Reaction            Onset            Reported/Identified   
         Relationship to Patient            Clinical Status        

 

 Yes            NARCOTICS            NARCOTICS                         Moderate
            N/A                         2013                             
     

 

 Yes            codeine            [id number]            Drug Allergy          
  Mild            N/V, SWEATS                         2015               
                   

 

 Yes            prednisone            [id number]            Drug Allergy       
     Unknown            N/A                         2015                 
                 



                      



Medications

      



There is no data.                  



Problems

      





 Date Dx Coded            Attending            Type            Code            
Diagnosis            Diagnosed By        

 

 2011                         Ot            211.1            BENIGN 
NEOPLASM STOMACH                     

 

 2011                         Ot            530.11            REFLUX 
ESOPHAGITIS                     

 

 2011                         Ot            535.40            OTH 
SPECIFIED GASTRITIS,W/O MENTION OF H                     

 

 2011                         Ot            553.3            
DIAPHRAGMATIC HERNIA                     

 

 02/10/2013                         Ot            112.0            [facility]      
               

 

 02/10/2013                         Ot            244.9            
HYPOTHYROIDISM NOS                     

 

 02/10/2013                         Ot            263.9            PROTEIN-VLAD 
MALNUTR NOS                     

 

 02/10/2013                         Ot            272.4            
HYPERLIPIDEMIA NEC/NOS                     

 

 02/10/2013                         Ot            275.2            DIS 
MAGNESIUM METABOLISM                     

 

 02/10/2013                         Ot            276.1            
HYPOSMOLALITY                     

 

 02/10/2013                         Ot            276.8            
HYPOPOTASSEMIA                     

 

 02/10/2013                         Ot            300.00            ANXIETY 
STATE NOS                     

 

 02/10/2013                         Ot            305.1            TOBACCO USE 
DISORDER                     

 

 02/10/2013                         Ot            362.50            MACULAR 
DEGENERATION NOS                     

 

 02/10/2013                         Ot            365.9            GLAUCOMA NOS
                     

 

 02/10/2013                         Ot            401.9            HYPERTENSION 
NOS                     

 

 02/10/2013                         Ot            410.71            AC 
MYOCARDIAL INFARCT,SUBENDO INFARCT,IN                     

 

 02/10/2013                         Ot            414.01            CORONARY 
ATHEROSCLEROSIS OF NATIVE CORON                     

 

 02/10/2013                         Ot            492.8            EMPHYSEMA 
NEC                     

 

 02/10/2013                         Ot            518.84            ACUTE AND 
CHRONIC RESPIRATORY FAILURE                     

 

 02/10/2013                         Ot            530.81            ESOPHAGEAL 
REFLUX                     

 

 02/10/2013                         Ot            790.29            OTHER 
ABNORMAL GLUCOSE                     

 

 02/10/2013                         Ot            820.8            FX NECK OF 
FEMUR NOS-CL                     

 

 02/10/2013                         Ot            E000.8            OTHER 
EXTERNAL CAUSE STATUS                     

 

 02/10/2013                         Ot            E885.9            FALL FROM 
SLIPPING, TRIPPING, OR STUMBLI                     

 

 2013                         Ot            112.0            [facility]      
               

 

 2013                         Ot            272.4            
HYPERLIPIDEMIA NEC/NOS                     

 

 2013                         Ot            276.1            
HYPOSMOLALITY                     

 

 2013                         Ot            276.8            
HYPOPOTASSEMIA                     

 

 2013                         Ot            285.9            ANEMIA NOS  
                   

 

 2013                         Ot            300.00            ANXIETY 
STATE NOS                     

 

 2013                         Ot            401.9            HYPERTENSION 
NOS                     

 

 2013                         Ot            410.72            AC MYOCARD 
INFARCT,SUBENDO INFARCT,SUBSE                     

 

 2013                         Ot            414.01            CORONARY 
ATHEROSCLEROSIS OF NATIVE CORON                     

 

 2013                         Ot            492.8            EMPHYSEMA 
NEC                     

 

 2013                         Ot            530.81            ESOPHAGEAL 
REFLUX                     

 

 2013                         Ot            599.0            URIN TRACT 
INFECTION NOS                     

 

 2013                         Ot            V54.13            AFTERCARE 
HEALING TRAUMATIC FX HIP                     

 

 2013                         Ot            V57.1            PHYSICAL 
THERAPY NEC                     

 

 2013                         Ot            V57.21            ENCOUNTER 
FOR OCCUPATIONAL THERAPY                     

 

 2013                         Ot            V58.65            LONG-TERM(
CURRENT)USE OF STEROIDS                     

 

 2013            PETE MCBRIDE DO            Ot            414.01 
           CORONARY ATHEROSCLEROSIS OF NATIVE CORON                     

 

 2013            PETE MCBRIDE DO            Ot            496    
        CHR AIRWAY OBSTRUCT NEC                     

 

 2013            PETE MCBRIDE DO            Ot            831.00 
           DISLOC SHOULDER NOS-CLOS                     

 

 2013            PETE MCBRIDE DO            Ot            E888.9 
           FALL NOS                     

 

 2013            PETE MCBRIDE DO            Ot            V45.82 
           PERCUTANEOUS TRANSLUM CORON ANGIOPLASTY                      

 

 2015            YADIRA MATHUR MD            Ot            244.9       
     HYPOTHYROIDISM NOS                     

 

 2015            KAREEN DOLL, YADIRA KELLY            Ot            401.9       
     HYPERTENSION NOS                     

 

 2015            KAREEN DOLL, YADIRA KELLY            Ot            486         
   PNEUMONIA, ORGANISM NOS                     

 

 2015            YADIRA MATHUR MD            Ot            491.21      
      OBSTR CHRONIC BRONCHITIS, W (ACUTE) EXAC                     

 

 2015            KAREEN DOLL, YADIRA KELLY            Ot            V04.81      
      ND FOR PROPHYLACTIC VACCIN AND INOCULATI                     

 

 2015            KAREEN DOLL, YADIRA KELLY            Ot            V12.79      
      PERSONAL HISTORY OTH SPEC DIGESTIVE SYST                     

 

 2015            YADIRA MATHUR MD            Ot            V15.82      
      HISTORY OF TOBACCO USE                     

 

 2015            KAREEN DOLL, YADIRA KELLY            Ot            V45.82      
      PERCUTANEOUS TRANSLUM CORON ANGIOPLASTY                      

 

 02/15/2016                         Ot            786.50                       
           

 

 02/15/2016                         Ot            V15.82                       
           

 

 2016            JUMA RENE DO            Ot            J44.9       
     CHRONIC OBSTRUCTIVE PULMONARY DISEASE, U                     

 

 2016            JUMA RENE DO            Ot            R06.09      
      OTHER FORMS OF DYSPNEA                     

 

 2016            JUMA RENE DO            Ot            J44.9       
     CHRONIC OBSTRUCTIVE PULMONARY DISEASE, U                     

 

 2016            JUMA RENE DO            Ot            R06.09      
      OTHER FORMS OF DYSPNEA                     

 

 2016                         Ot            719.07            JOINT 
EFFUSION-ANKLE                     

 

 2016                         Ot            719.47            JOINT PAIN-
ANKLE                     

 

 2016                         Ot            959.7            LOWER LEG 
INJURY NOS                     

 

 2016                         Ot            E000.8            OTHER 
EXTERNAL CAUSE STATUS                     

 

 2016                         Ot            E849.0            ACCIDENT IN 
HOME                     

 

 2016                         Ot            E888.9            FALL NOS   
                  

 

 2016                         Ot            272.4            
HYPERLIPIDEMIA NEC/NOS                     

 

 2016                         Ot            414.01            CORONARY 
ATHEROSCLEROSIS OF NATIVE CORON                     

 

 2016                         Ot            V58.63            LONG-TERM(
CURRENT)USE OF ANTIPLATELET/AN                     

 

 2016            KALLI MORALES MD            Ot            272.4       
     HYPERLIPIDEMIA NEC/NOS                     

 

 2016            KALLI MORALES MD            Ot            401.9       
     HYPERTENSION NOS                     

 

 2016            KALLI MORALES MD            Ot            414.01      
      CORONARY ATHEROSCLEROSIS OF NATIVE CORON                     

 

 2016                         Ot            786.50            CHEST PAIN 
NOS                     

 

 2016                         Ot            V15.82            HISTORY OF 
TOBACCO USE                     

 

 2016            JUMA RENE DO            Ot            J44.9       
     CHRONIC OBSTRUCTIVE PULMONARY DISEASE, U                     

 

 2016            JUMA RENE DO            Ot            R06.09      
      OTHER FORMS OF DYSPNEA                     

 

 2016            YADIRA MATHUR MD            Ot            E03.9       
     HYPOTHYROIDISM, UNSPECIFIED                     

 

 2016            YADIRA MATHUR MD            Ot            H81.10      
      BENIGN PAROXYSMAL VERTIGO, UNSPECIFIED E                     

 

 2016            YADIRA MATHUR MD            Ot            I10         
   ESSENTIAL (PRIMARY) HYPERTENSION                     

 

 2016            YADIRA MATHUR MD            Ot            I25.10      
      ATHSCL HEART DISEASE OF NATIVE CORONARY                      

 

 2016            YADIRA MATHUR MD            Ot            I38         
   ENDOCARDITIS, VALVE UNSPECIFIED                     

 

 2016            YADIRA MATHUR MD R            Ot            J44.9       
     CHRONIC OBSTRUCTIVE PULMONARY DISEASE, U                     

 

 2016            YADIRA MATHUR MD R            Ot            K21.9       
     GASTRO-ESOPHAGEAL REFLUX DISEASE WITHOUT                     

 

 2016            YADIRA MATHUR MD R            Ot            M19.90      
      UNSPECIFIED OSTEOARTHRITIS, UNSPECIFIED                      

 

 2016            YADIRA MATHUR MD R            Ot            N39.0       
     URINARY TRACT INFECTION, SITE NOT SPECIF                     

 

 2016            YADIRA MATHUR MD R            Ot            R11.2       
     NAUSEA WITH VOMITING, UNSPECIFIED                     

 

 2016            YADIRA MATHUR MD R            Ot            Z66         
   DO NOT RESUSCITATE                     

 

 2016            YADIRA MATHUR MD R            Ot            Z87.891     
       PERSONAL HISTORY OF NICOTINE DEPENDENCE                     

 

 2016            YADIRA MATHUR MD R            Ot            Z95.5       
     PRESENCE OF CORONARY ANGIOPLASTY IMPLANT                     

 

 2016            YADIRA MATHUR MD R            Ot            Z99.81      
      DEPENDENCE ON SUPPLEMENTAL OXYGEN                     

 

 2016            YADIRA MATHUR MD R            Ot            E03.9       
     HYPOTHYROIDISM, UNSPECIFIED                     

 

 2016            YADIRA MATHUR MD R            Ot            H81.10      
      BENIGN PAROXYSMAL VERTIGO, UNSPECIFIED E                     

 

 2016            YADIRA MATHUR MD R            Ot            I10         
   ESSENTIAL (PRIMARY) HYPERTENSION                     

 

 2016            YADIRA MATHUR MD R            Ot            I25.10      
      ATHSCL HEART DISEASE OF NATIVE CORONARY                      

 

 2016            YADIRA MATHUR MD R            Ot            I38         
   ENDOCARDITIS, VALVE UNSPECIFIED                     

 

 2016            YADIRA MATHUR MD R            Ot            J44.9       
     CHRONIC OBSTRUCTIVE PULMONARY DISEASE, U                     

 

 2016            YADIRA MATHUR MD R            Ot            K21.9       
     GASTRO-ESOPHAGEAL REFLUX DISEASE WITHOUT                     

 

 2016            YADIRA MATHUR MD R            Ot            M19.90      
      UNSPECIFIED OSTEOARTHRITIS, UNSPECIFIED                      

 

 2016            YADIRA MATHUR MD R            Ot            N39.0       
     URINARY TRACT INFECTION, SITE NOT SPECIF                     

 

 2016            YADIRA MATHUR MD R            Ot            R11.2       
     NAUSEA WITH VOMITING, UNSPECIFIED                     

 

 2016            YADIRA MATHUR MD R            Ot            Z66         
   DO NOT RESUSCITATE                     

 

 2016            YADIRA MATHUR MD R            Ot            Z87.891     
       PERSONAL HISTORY OF NICOTINE DEPENDENCE                     

 

 2016            YADIRA MATHUR MD            Ot            Z95.5       
     PRESENCE OF CORONARY ANGIOPLASTY IMPLANT                     

 

 2016            YADIRA MATHUR MD            Ot            Z99.81      
      DEPENDENCE ON SUPPLEMENTAL OXYGEN                     

 

 2016            YADIRA MATHUR MD            Ot            E03.9       
     HYPOTHYROIDISM, UNSPECIFIED                     

 

 2016            YADIRA MATHUR MD            Ot            H83.01      
      LABYRINTHITIS, RIGHT EAR                     

 

 2016            YADIRA MATHUR MD            Ot            H83.09      
      LABYRINTHITIS, UNSPECIFIED EAR                     

 

 2016            YADIRA MATHUR MD            Ot            I10         
   ESSENTIAL (PRIMARY) HYPERTENSION                     

 

 2016            YADIRA MATHUR MD            Ot            I25.10      
      ATHSCL HEART DISEASE OF NATIVE CORONARY                      

 

 2016            YADIRA MATHUR MD            Ot            I38         
   ENDOCARDITIS, VALVE UNSPECIFIED                     

 

 2016            YADIRA MATHUR MD            Ot            J18.9       
     PNEUMONIA, UNSPECIFIED ORGANISM                     

 

 2016            YADIRA MATHUR MD            Ot            J44.9       
     CHRONIC OBSTRUCTIVE PULMONARY DISEASE, U                     

 

 2016            YADIRA MATHUR MD            Ot            K21.9       
     GASTRO-ESOPHAGEAL REFLUX DISEASE WITHOUT                     

 

 2016            YADIRA MATHUR MD            Ot            M19.90      
      UNSPECIFIED OSTEOARTHRITIS, UNSPECIFIED                      

 

 2016            YADIRA MATHUR MD            Ot            Z66         
   DO NOT RESUSCITATE                     

 

 2016            YADIRA MATHUR MD            Ot            Z87.891     
       PERSONAL HISTORY OF NICOTINE DEPENDENCE                     

 

 2016            YADIRA MATHUR MD            Ot            Z95.5       
     PRESENCE OF CORONARY ANGIOPLASTY IMPLANT                     

 

 2016            YADIRA MATHUR MD R            Ot            Z99.81      
      DEPENDENCE ON SUPPLEMENTAL OXYGEN                     

 

 2016                         Ot            719.07            JOINT 
EFFUSION-ANKLE                     

 

 2016                         Ot            719.47            JOINT PAIN-
ANKLE                     

 

 2016                         Ot            959.7            LOWER LEG 
INJURY NOS                     

 

 2016                         Ot            E000.8            OTHER 
EXTERNAL CAUSE STATUS                     

 

 2016                         Ot            E849.0            ACCIDENT IN 
HOME                     

 

 2016                         Ot            E888.9            FALL NOS   
                  

 

 2016                         Ot            272.4            
HYPERLIPIDEMIA NEC/NOS                     

 

 2016                         Ot            414.01            CORONARY 
ATHEROSCLEROSIS OF NATIVE CORON                     

 

 2016                         Ot            V58.63            LONG-TERM(
CURRENT)USE OF ANTIPLATELET/AN                     

 

 2016            KALLI MORALES MD            Ot            272.4       
     HYPERLIPIDEMIA NEC/NOS                     

 

 2016            ANDREW DOLL, KALLI MACK            Ot            401.9       
     HYPERTENSION NOS                     

 

 2016            KALLI MORALES MD            Ot            414.01      
      CORONARY ATHEROSCLEROSIS OF NATIVE CORON                     

 

 2016                         Ot            786.50            CHEST PAIN 
NOS                     

 

 2016                         Ot            V15.82            HISTORY OF 
TOBACCO USE                     

 

 2016            JUMA RENE DO            Ot            J44.9       
     CHRONIC OBSTRUCTIVE PULMONARY DISEASE, U                     

 

 2016            JUMA RENE DO            Ot            R06.09      
      OTHER FORMS OF DYSPNEA                     

 

 11/15/2016            ALON STOUT            Ot            
E78.2            MIXED HYPERLIPIDEMIA                     

 

 11/15/2016            ALON STOUT            Ot            
I10            ESSENTIAL (PRIMARY) HYPERTENSION                     

 

 11/15/2016            ALON STOUT            Ot            
I25.10            ATHSCL HEART DISEASE OF NATIVE CORONARY                      

 

 11/15/2016            ALON STOUT            Ot            
I65.23            OCCLUSION AND STENOSIS OF BILATERAL ROMO                     

 

 2016            ALON STOUT            Ot            
E78.2            MIXED HYPERLIPIDEMIA                     

 

 2016            ALON STOUT            Ot            
I10            ESSENTIAL (PRIMARY) HYPERTENSION                     

 

 2016            ALON STOUT            Ot            
I25.10            ATHSCL HEART DISEASE OF NATIVE CORONARY                      

 

 2016            ALON STOUT            Ot            
I65.23            OCCLUSION AND STENOSIS OF BILATERAL ROMO                     

 

 02/10/2017            MOSES CHAIREZ MD            Ot            E03.9     
       HYPOTHYROIDISM, UNSPECIFIED                     

 

 02/10/2017            MOSES CHAIREZ MD            Ot            E78.00    
        PURE HYPERCHOLESTEROLEMIA, UNSPECIFIED                     

 

 02/10/2017            MOSES CHAIREZ MD            Ot            E83.42    
        HYPOMAGNESEMIA                     

 

 02/10/2017            MOSES CHAIREZ MD            Ot            E87.6     
       HYPOKALEMIA                     

 

 02/10/2017            MOSES CHAIREZ MD            Ot            G47.36    
        SLEEP RELATED HYPOVENTILATION IN CONDITI                     

 

 02/10/2017            MOSES CHAIREZ MD            Ot            I10       
     ESSENTIAL (PRIMARY) HYPERTENSION                     

 

 02/10/2017            MOSES CHAIREZ MD            Ot            I25.10    
        ATHSCL HEART DISEASE OF NATIVE CORONARY                      

 

 02/10/2017            MOSES CHAIREZ MD, Ot            J44.9     
       CHRONIC OBSTRUCTIVE PULMONARY DISEASE, U                     

 

 02/10/2017            MOSES CHAIREZ MD, Ot            K21.9     
       GASTRO-ESOPHAGEAL REFLUX DISEASE WITHOUT                     

 

 02/10/2017            MOSES CHAIREZ MD, Ot            K52.9     
       NONINFECTIVE GASTROENTERITIS AND COLITIS                     

 

 02/10/2017            MOSES CHAIREZ MD, Ot            M19.91    
        PRIMARY OSTEOARTHRITIS, UNSPECIFIED SITE                     

 

 02/10/2017            MOSES CHAIREZ MD, Ot            N39.0     
       URINARY TRACT INFECTION, SITE NOT SPECIF                     

 

 02/10/2017            MOSES CHAIREZ MD, Ot            R42       
     DIZZINESS AND GIDDINESS                     

 

 02/10/2017            MOSES CHAIREZ MD, Ot            Z66       
     DO NOT RESUSCITATE                     

 

 02/10/2017            MOSES CHAIREZ MD, Ot            Z87.891   
         PERSONAL HISTORY OF NICOTINE DEPENDENCE                     

 

 02/10/2017            MOSES CHAIREZ MD, Ot            Z95.5     
       PRESENCE OF CORONARY ANGIOPLASTY IMPLANT                     

 

 02/10/2017            MOSES CHAIREZ MD, Ot            Z99.81    
        DEPENDENCE ON SUPPLEMENTAL OXYGEN                     

 

 2017            MOSES CHAIREZ MD, Ot            N39.0     
       URINARY TRACT INFECTION, SITE NOT SPECIF                     

 

 2017            MOSES CHAIREZ MD, Ot            N39.0     
       URINARY TRACT INFECTION, SITE NOT SPECIF                     

 

 2017            MOSES CHAIREZ MD, Ot            N39.0     
       URINARY TRACT INFECTION, SITE NOT SPECIF                     



                                                                               
                                                                               
                                                                               
                                                                               
                        



Procedures

      





 Code            Description            Performed By            Performed On   
     

 

             78.55                                  INTERNAL FIXATION-FEMUR    
                               2013        

 

             96.04                                  INSERT ENDOTRACHEAL TUBE   
                                2013        

 

             96.71                                  CONTINUOUS INVASIVE 
MECHANICAL VENTILATI                                   2013        

 

             00.66                                  PERCUTANEOUS TRANSLUMINAL 
CORONARY ANGIO                                   2013        

 

             36.07                                  INSRT OF DRUG-ELUTING CORON 
ARTERY STENT                                   2013        

 

             37.22                                  LEFT HEART CARDIAC CATH    
                               2013        

 

             88.53                                  LT HEART ANGIOCARDIOGRAM   
                                2013        

 

             88.56                                  CORONAR ARTERIOGR-2 CATH   
                                2013        



                                



Results

      





 Test            Result            Range        









 Serum or plasma thyroxine (T4) free measurement (mass/volume) - 16 18:10
         









 Serum or plasma thyroxine (T4) free measurement (mass/volume)            1.53 
ng/dL            0.70-1.48        









 Complete blood count (CBC) with automated white blood cell (WBC) differential 
- 16 18:15         









 Blood leukocytes automated count (number/volume)            7.9 10*3/uL       
     4.3-11.0        

 

 Blood erythrocytes automated count (number/volume)            4.11 10*6/uL    
        4.35-5.85        

 

 Venous blood hemoglobin measurement (mass/volume)            13.6 g/dL        
    11.5-16.0        

 

 Blood hematocrit (volume fraction)            40 %            35-52        

 

 Automated erythrocyte mean corpuscular volume            98 [foz_us]          
  80-99        

 

 Automated erythrocyte mean corpuscular hemoglobin (mass per erythrocyte)      
      33 pg            25-34        

 

 Automated erythrocyte mean corpuscular hemoglobin concentration measurement (
mass/volume)            34 g/dL            32-36        

 

 Automated erythrocyte distribution width ratio            12.4 %            
10.0-14.5        

 

 Automated blood platelet count (count/volume)            234 10*3/uL          
  130-400        

 

 Automated blood platelet mean volume measurement            11.4 [foz_us]     
       7.4-10.4        

 

 Automated blood neutrophils/100 leukocytes            49 %            42-75   
     

 

 Automated blood lymphocytes/100 leukocytes            38 %            12-44   
     

 

 Blood monocytes/100 leukocytes            9 %            0-12        

 

 Automated blood eosinophils/100 leukocytes            4 %            0-10     
   

 

 Automated blood basophils/100 leukocytes            0 %            0-10        

 

 Blood neutrophils automated count (number/volume)            3.9 10*3         
   1.8-7.8        

 

 Blood lymphocytes automated count (number/volume)            3.0 10*3         
   1.0-4.0        

 

 Blood monocytes automated count (number/volume)            0.7 10*3            
0.0-1.0        

 

 Automated eosinophil count            0.3 10*3/uL            0.0-0.3        

 

 Automated blood basophil count (count/volume)            0.0 10*3/uL          
  0.0-0.1        









 Comprehensive metabolic panel - 16 18:15         









 Serum or plasma sodium measurement (moles/volume)            135 mmol/L       
     135-145        

 

 Serum or plasma potassium measurement (moles/volume)            4.1 mmol/L    
        3.6-5.0        

 

 Serum or plasma chloride measurement (moles/volume)            103 mmol/L     
               

 

 Carbon dioxide            22 mmol/L            21-32        

 

 Serum or plasma anion gap determination (moles/volume)            10 mmol/L   
         5-14        

 

 Serum or plasma urea nitrogen measurement (mass/volume)            17 mg/dL   
         7-18        

 

 Serum or plasma creatinine measurement (mass/volume)            1.10 mg/dL    
        0.60-1.30        

 

 Serum or plasma urea nitrogen/creatinine mass ratio            15             
NRG        

 

 Serum or plasma creatinine measurement with calculation of estimated 
glomerular filtration rate            47             NRG        

 

 Serum or plasma glucose measurement (mass/volume)            111 mg/dL        
            

 

 Serum or plasma calcium measurement (mass/volume)            9.2 mg/dL        
    8.5-10.1        

 

 Serum or plasma total bilirubin measurement (mass/volume)            0.5 mg/dL
            0.1-1.0        

 

 Serum or plasma alkaline phosphatase measurement (enzymatic activity/volume)  
          96 U/L                    

 

 Serum or plasma aspartate aminotransferase measurement (enzymatic activity/
volume)            23 U/L            5-34        

 

 Serum or plasma alanine aminotransferase measurement (enzymatic activity/volume
)            24 U/L            0-55        

 

 Serum or plasma protein measurement (mass/volume)            6.9 g/dL         
   6.4-8.2        

 

 Serum or plasma albumin measurement (mass/volume)            4.3 g/dL         
   3.2-4.5        









 Magnesium - 16 18:15         









 Magnesium            2.2 mg/dL            1.8-2.4        









 Serum or plasma lithium measurement (moles/volume) - 16 18:15         









 BNP level            240.5 pg/mL            <100.0        









 Serum or plasma troponin i.cardiac measurement (mass/volume) - 16 18:15 
        









 Serum or plasma troponin i.cardiac measurement (mass/volume)            < ng/
mL            <0.30        









 Serum or plasma thyrotropin measurement by detection limit <=0.05 miu/l (units/
volume) - 16 18:15         









 Serum or plasma thyrotropin measurement by detection limit <=0.05 miu/l (units/
volume)            0.08 u[iU]/mL            0.35-4.94        









 Complete urinalysis with reflex to culture - 16 18:30         









 Urine color determination            YELLOW             NRG        

 

 Urine clarity determination            SLIGHTLY CLOUDY             NRG        

 

 Urine pH measurement by test strip            6             5-9        

 

 Specific gravity of urine by test strip            1.020             1.016-
1.022        

 

 Urine protein assay by test strip, semi-quantitative            1+             
NEGATIVE        

 

 Urine glucose detection by automated test strip            NEGATIVE           
  NEGATIVE        

 

 Erythrocytes detection in urine sediment by light microscopy            2+    
         NEGATIVE        

 

 Urine ketones detection by automated test strip            NEGATIVE           
  NEGATIVE        

 

 Urine nitrite detection by test strip            NEGATIVE             NEGATIVE
        

 

 Urine total bilirubin detection by test strip            NEGATIVE             
NEGATIVE        

 

 Urine urobilinogen measurement by automated test strip (mass/volume)          
  NORMAL             NORMAL        

 

 Urine leukocyte esterase detection by dipstick            3+             
NEGATIVE        

 

 Automated urine sediment erythrocyte count by microscopy (number/high power 
field)             [HPF]            NRG        

 

 Automated urine sediment leukocyte count by microscopy (number/high power field
)             [HPF]            NRG        

 

 Bacteria detection in urine sediment by light microscopy            FEW       
      NRG        

 

 Squamous epithelial cells detection in urine sediment by light microscopy     
       2-5             NRG        

 

 Crystals detection in urine sediment by light microscopy            NONE      
       NRG        

 

 Casts detection in urine sediment by light microscopy            NONE         
    NRG        

 

 Mucus detection in urine sediment by light microscopy            NEGATIVE     
        NRG        

 

 Complete urinalysis with reflex to culture            YES             NRG     
   









 Bacterial urine culture - 16 18:30         









 Bacterial urine culture            NG             NRG        









 Whole blood basic metabolic panel - 16 04:54         









 Serum or plasma sodium measurement (moles/volume)            135 mmol/L       
     135-145        

 

 Serum or plasma potassium measurement (moles/volume)            4.2 mmol/L    
        3.6-5.0        

 

 Serum or plasma chloride measurement (moles/volume)            104 mmol/L     
               

 

 Carbon dioxide            23 mmol/L            21-32        

 

 Serum or plasma anion gap determination (moles/volume)            8 mmol/L    
        5-14        

 

 Serum or plasma urea nitrogen measurement (mass/volume)            10 mg/dL   
         7-18        

 

 Serum or plasma creatinine measurement (mass/volume)            0.76 mg/dL    
        0.60-1.30        

 

 Serum or plasma urea nitrogen/creatinine mass ratio            13             
NRG        

 

 Serum or plasma creatinine measurement with calculation of estimated 
glomerular filtration rate            >             NRG        

 

 Serum or plasma glucose measurement (mass/volume)            109 mg/dL        
            

 

 Serum or plasma calcium measurement (mass/volume)            8.6 mg/dL        
    8.5-10.1        









 Serum or plasma lithium measurement (moles/volume) - 16 04:54         









 BNP level            426.4 pg/mL            <100.0        









 Complete blood count (CBC) with automated white blood cell (WBC) differential 
- 17 13:54         









 Blood leukocytes automated count (number/volume)            5.5 10*3/uL       
     4.3-11.0        

 

 Blood erythrocytes automated count (number/volume)            4.43 10*6/uL    
        4.35-5.85        

 

 Venous blood hemoglobin measurement (mass/volume)            14.4 g/dL        
    11.5-16.0        

 

 Blood hematocrit (volume fraction)            41 %            35-52        

 

 Automated erythrocyte mean corpuscular volume            93 [foz_us]          
  80-99        

 

 Automated erythrocyte mean corpuscular hemoglobin (mass per erythrocyte)      
      33 pg            25-34        

 

 Automated erythrocyte mean corpuscular hemoglobin concentration measurement (
mass/volume)            35 g/dL            32-36        

 

 Automated erythrocyte distribution width ratio            13.1 %            
10.0-14.5        

 

 Automated blood platelet count (count/volume)            158 10*3/uL          
  130-400        

 

 Automated blood platelet mean volume measurement            11.7 [foz_us]     
       7.4-10.4        

 

 Automated blood neutrophils/100 leukocytes            71 %            42-75   
     

 

 Automated blood lymphocytes/100 leukocytes            13 %            12-44   
     

 

 Blood monocytes/100 leukocytes            16 %            0-12        

 

 Automated blood eosinophils/100 leukocytes            0 %            0-10     
   

 

 Automated blood basophils/100 leukocytes            0 %            0-10        

 

 Blood neutrophils automated count (number/volume)            4.0 10*3         
   1.8-7.8        

 

 Blood lymphocytes automated count (number/volume)            0.7 10*3         
   1.0-4.0        

 

 Blood monocytes automated count (number/volume)            0.9 10*3            
0.0-1.0        

 

 Automated eosinophil count            0.0 10*3/uL            0.0-0.3        

 

 Automated blood basophil count (count/volume)            0.0 10*3/uL          
  0.0-0.1        









 Comprehensive metabolic panel - 17 13:54         









 Serum or plasma sodium measurement (moles/volume)            137 mmol/L       
     135-145        

 

 Serum or plasma potassium measurement (moles/volume)            2.7 mmol/L    
        3.6-5.0        

 

 Serum or plasma chloride measurement (moles/volume)            105 mmol/L     
               

 

 Carbon dioxide            20 mmol/L            21-32        

 

 Serum or plasma anion gap determination (moles/volume)            12 mmol/L   
         5-14        

 

 Serum or plasma urea nitrogen measurement (mass/volume)            14 mg/dL   
         7-18        

 

 Serum or plasma creatinine measurement (mass/volume)            0.65 mg/dL    
        0.60-1.30        

 

 Serum or plasma urea nitrogen/creatinine mass ratio            22             
NRG        

 

 Serum or plasma creatinine measurement with calculation of estimated 
glomerular filtration rate            >             NRG        

 

 Serum or plasma glucose measurement (mass/volume)            88 mg/dL         
           

 

 Serum or plasma calcium measurement (mass/volume)            6.7 mg/dL        
    8.5-10.1        

 

 Serum or plasma total bilirubin measurement (mass/volume)            0.3 mg/dL
            0.1-1.0        

 

 Serum or plasma alkaline phosphatase measurement (enzymatic activity/volume)  
          64 U/L                    

 

 Serum or plasma aspartate aminotransferase measurement (enzymatic activity/
volume)            19 U/L            5-34        

 

 Serum or plasma alanine aminotransferase measurement (enzymatic activity/volume
)            10 U/L            0-55        

 

 Serum or plasma protein measurement (mass/volume)            5.4 g/dL         
   6.4-8.2        

 

 Serum or plasma albumin measurement (mass/volume)            3.1 g/dL         
   3.2-4.5        









 Magnesium - 17 13:54         









 Magnesium            1.3 mg/dL            1.8-2.4        









 THYROID STIMULATING HORMONE - 17 13:54         









 THYROID STIMULATING HORMONE            0.03 u[iU]/mL            0.35-4.94     
   









 Serum or plasma thyroxine (T4) free measurement (mass/volume) - 17 13:54
         









 Serum or plasma thyroxine (T4) free measurement (mass/volume)            1.49 
ng/dL            0.70-1.48        









 Complete urinalysis with reflex to culture - 17 15:03         









 Urine color determination            YELLOW             NRG        

 

 Urine clarity determination            SLIGHTLY CLOUDY             NRG        

 

 Urine pH measurement by test strip            6             5-9        

 

 Specific gravity of urine by test strip            1.020             1.016-
1.022        

 

 Urine protein assay by test strip, semi-quantitative            3+             
NEGATIVE        

 

 Urine glucose detection by automated test strip            NEGATIVE           
  NEGATIVE        

 

 Erythrocytes detection in urine sediment by light microscopy            3+    
         NEGATIVE        

 

 Urine ketones detection by automated test strip            4+             
NEGATIVE        

 

 Urine nitrite detection by test strip            NEGATIVE             NEGATIVE
        

 

 Urine total bilirubin detection by test strip            NEGATIVE             
NEGATIVE        

 

 Urine urobilinogen measurement by automated test strip (mass/volume)          
  NORMAL             NORMAL        

 

 Urine leukocyte esterase detection by dipstick            NEGATIVE             
NEGATIVE        

 

 Automated urine sediment erythrocyte count by microscopy (number/high power 
field)             [HPF]            NRG        

 

 Automated urine sediment leukocyte count by microscopy (number/high power field
)             [HPF]            NRG        

 

 Bacteria detection in urine sediment by light microscopy            FEW       
      NRG        

 

 Squamous epithelial cells detection in urine sediment by light microscopy     
       10-25             NRG        

 

 Crystals detection in urine sediment by light microscopy            NONE      
       NRG        

 

 Casts detection in urine sediment by light microscopy            PRESENT      
       NRG        

 

 Mucus detection in urine sediment by light microscopy            MODERATE     
        NRG        

 

 Complete urinalysis with reflex to culture            YES             NRG     
   

 

 Hyaline casts detection in urine sediment by light microscopy            25-50
             NRG        









 Bacterial urine culture - 17 15:03         









 URINE CULTURE RESULTS            10,000/ML - 100,000/ML             NRG        









 Complete blood count (CBC) with automated white blood cell (WBC) differential 
- 17 06:16         









 Blood leukocytes automated count (number/volume)            4.5 10*3/uL       
     4.3-11.0        

 

 Blood erythrocytes automated count (number/volume)            4.26 10*6/uL    
        4.35-5.85        

 

 Venous blood hemoglobin measurement (mass/volume)            13.8 g/dL        
    11.5-16.0        

 

 Blood hematocrit (volume fraction)            40 %            35-52        

 

 Automated erythrocyte mean corpuscular volume            95 [foz_us]          
  80-99        

 

 Automated erythrocyte mean corpuscular hemoglobin (mass per erythrocyte)      
      32 pg            25-34        

 

 Automated erythrocyte mean corpuscular hemoglobin concentration measurement (
mass/volume)            34 g/dL            32-36        

 

 Automated erythrocyte distribution width ratio            13.5 %            
10.0-14.5        

 

 Automated blood platelet count (count/volume)            154 10*3/uL          
  130-400        

 

 Automated blood platelet mean volume measurement            11.7 [foz_us]     
       7.4-10.4        

 

 Automated blood neutrophils/100 leukocytes            64 %            42-75   
     

 

 Automated blood lymphocytes/100 leukocytes            17 %            12-44   
     

 

 Blood monocytes/100 leukocytes            19 %            0-12        

 

 Automated blood eosinophils/100 leukocytes            0 %            0-10     
   

 

 Automated blood basophils/100 leukocytes            0 %            0-10        

 

 Blood neutrophils automated count (number/volume)            2.9 10*3         
   1.8-7.8        

 

 Blood lymphocytes automated count (number/volume)            0.8 10*3         
   1.0-4.0        

 

 Blood monocytes automated count (number/volume)            0.9 10*3            
0.0-1.0        

 

 Automated eosinophil count            0.0 10*3/uL            0.0-0.3        

 

 Automated blood basophil count (count/volume)            0.0 10*3/uL          
  0.0-0.1        









 Whole blood basic metabolic panel - 17 06:16         









 Serum or plasma sodium measurement (moles/volume)            135 mmol/L       
     135-145        

 

 Serum or plasma potassium measurement (moles/volume)            3.7 mmol/L    
        3.6-5.0        

 

 Serum or plasma chloride measurement (moles/volume)            103 mmol/L     
               

 

 Carbon dioxide            23 mmol/L            21-32        

 

 Serum or plasma anion gap determination (moles/volume)            9 mmol/L    
        5-14        

 

 Serum or plasma urea nitrogen measurement (mass/volume)            16 mg/dL   
         7-18        

 

 Serum or plasma creatinine measurement (mass/volume)            0.84 mg/dL    
        0.60-1.30        

 

 Serum or plasma urea nitrogen/creatinine mass ratio            19             
NRG        

 

 Serum or plasma creatinine measurement with calculation of estimated 
glomerular filtration rate            >             NRG        

 

 Serum or plasma glucose measurement (mass/volume)            95 mg/dL         
           

 

 Serum or plasma calcium measurement (mass/volume)            8.1 mg/dL        
    8.5-10.1        









 Magnesium - 17 06:16         









 Magnesium            2.1 mg/dL            1.8-2.4        









 Blood manual differential performed detection - 17 06:16         









 Blood monocytes/100 leukocytes            10 %            NRG        

 

 Manual blood segmented neutrophils/100 leukocytes            77 %            
NRG        

 

 Blood band neutrophils/100 leukocytes            0 %            NRG        

 

 Manual blood lymphocytes/100 leukocytes            7 %            NRG        

 

 Manual eosinophils/100 leukocytes in nose            0 %            NRG        

 

 Manual blood basophils/100 leukocytes            0 %            NRG        

 

 Blood lymphocytes variant/100 leukocytes            6 %            NRG        

 

 Blood erythrocyte morphology finding identification            NORMAL         
    NRG        









 Cyanocobalamin measurement - 17 06:16         









 Vitamin B12            202 pg/mL            200-1000        









 Automated blood complete blood count (hemogram) panel - 17 07:12         









 Blood leukocytes automated count (number/volume)            3.1 10*3/uL       
     4.3-11.0        

 

 Blood erythrocytes automated count (number/volume)            3.73 10*6/uL    
        4.35-5.85        

 

 Venous blood hemoglobin measurement (mass/volume)            12.0 g/dL        
    11.5-16.0        

 

 Blood hematocrit (volume fraction)            37 %            35-52        

 

 Automated erythrocyte mean corpuscular volume            100 [foz_us]         
   80-99        

 

 Automated erythrocyte mean corpuscular hemoglobin (mass per erythrocyte)      
      32 pg            25-34        

 

 Automated erythrocyte mean corpuscular hemoglobin concentration measurement (
mass/volume)            32 g/dL            32-36        

 

 Automated erythrocyte distribution width ratio            13.9 %            
10.0-14.5        

 

 Automated blood platelet count (count/volume)            118 10*3/uL          
  130-400        

 

 Automated blood platelet mean volume measurement            11.4 [foz_us]     
       7.4-10.4        









 Comprehensive metabolic panel - 17 07:12         









 Serum or plasma sodium measurement (moles/volume)            139 mmol/L       
     135-145        

 

 Serum or plasma potassium measurement (moles/volume)            5.0 mmol/L    
        3.6-5.0        

 

 Serum or plasma chloride measurement (moles/volume)            110 mmol/L     
               

 

 Carbon dioxide            24 mmol/L            21-32        

 

 Serum or plasma anion gap determination (moles/volume)            5 mmol/L    
        5-14        

 

 Serum or plasma urea nitrogen measurement (mass/volume)            4 mg/dL    
        7-18        

 

 Serum or plasma creatinine measurement (mass/volume)            0.68 mg/dL    
        0.60-1.30        

 

 Serum or plasma urea nitrogen/creatinine mass ratio            6             
NRG        

 

 Serum or plasma creatinine measurement with calculation of estimated 
glomerular filtration rate            >             NRG        

 

 Serum or plasma glucose measurement (mass/volume)            86 mg/dL         
           

 

 Serum or plasma calcium measurement (mass/volume)            8.1 mg/dL        
    8.5-10.1        

 

 Serum or plasma total bilirubin measurement (mass/volume)            0.3 mg/dL
            0.1-1.0        

 

 Serum or plasma alkaline phosphatase measurement (enzymatic activity/volume)  
          58 U/L                    

 

 Serum or plasma aspartate aminotransferase measurement (enzymatic activity/
volume)            19 U/L            5-34        

 

 Serum or plasma alanine aminotransferase measurement (enzymatic activity/volume
)            11 U/L            0-55        

 

 Serum or plasma protein measurement (mass/volume)            5.3 g/dL         
   6.4-8.2        

 

 Serum or plasma albumin measurement (mass/volume)            3.3 g/dL         
   3.2-4.5        









 Complete urinalysis with reflex to culture - 17 16:00         









 Urine color determination            HUBERT             NRG        

 

 Urine clarity determination            SLIGHTLY CLOUDY             NRG        

 

 Urine pH measurement by test strip            7             5-9        

 

 Specific gravity of urine by test strip            1.010             1.016-
1.022        

 

 Urine protein assay by test strip, semi-quantitative            1+             
NEGATIVE        

 

 Urine glucose detection by automated test strip            NEGATIVE           
  NEGATIVE        

 

 Erythrocytes detection in urine sediment by light microscopy            
NEGATIVE             NEGATIVE        

 

 Urine ketones detection by automated test strip            NEGATIVE           
  NEGATIVE        

 

 Urine nitrite detection by test strip            NEGATIVE             NEGATIVE
        

 

 Urine total bilirubin detection by test strip            NEGATIVE             
NEGATIVE        

 

 Urine urobilinogen measurement by automated test strip (mass/volume)          
  1 mg/dL            NORMAL        

 

 Urine leukocyte esterase detection by dipstick            2+             
NEGATIVE        

 

 Automated urine sediment erythrocyte count by microscopy (number/high power 
field)            RARE             NRG        

 

 Automated urine sediment leukocyte count by microscopy (number/high power field
)             [HPF]            NRG        

 

 Bacteria detection in urine sediment by light microscopy            TRACE     
        NRG        

 

 Squamous epithelial cells detection in urine sediment by light microscopy     
       10-25             NRG        

 

 Crystals detection in urine sediment by light microscopy            NONE      
       NRG        

 

 Casts detection in urine sediment by light microscopy            NONE         
    NRG        

 

 Mucus detection in urine sediment by light microscopy            MODERATE     
        NRG        

 

 Complete urinalysis with reflex to culture            YES             NRG     
   









 Bacterial urine culture - 17 16:00         









 URINE CULTURE RESULTS            MORE THAN 3 ISOLATES             NRG        









 Complete blood count (CBC) with automated white blood cell (WBC) differential 
- 18 13:20         









 Blood leukocytes automated count (number/volume)            7.6 10*3/uL       
     4.3-11.0        

 

 Blood erythrocytes automated count (number/volume)            4.01 10*6/uL    
        4.35-5.85        

 

 Venous blood hemoglobin measurement (mass/volume)            13.5 g/dL        
    11.5-16.0        

 

 Blood hematocrit (volume fraction)            40 %            35-52        

 

 Automated erythrocyte mean corpuscular volume            100 [foz_us]         
   80-99        

 

 Automated erythrocyte mean corpuscular hemoglobin (mass per erythrocyte)      
      34 pg            25-34        

 

 Automated erythrocyte mean corpuscular hemoglobin concentration measurement (
mass/volume)            34 g/dL            32-36        

 

 Automated erythrocyte distribution width ratio            13.3 %            
10.0-14.5        

 

 Automated blood platelet count (count/volume)            235 10*3/uL          
  130-400        

 

 Automated blood platelet mean volume measurement            10.9 [foz_us]     
       7.4-10.4        

 

 Automated blood neutrophils/100 leukocytes            61 %            42-75   
     

 

 Automated blood lymphocytes/100 leukocytes            27 %            12-44   
     

 

 Blood monocytes/100 leukocytes            10 %            0-12        

 

 Automated blood eosinophils/100 leukocytes            2 %            0-10     
   

 

 Automated blood basophils/100 leukocytes            0 %            0-10        

 

 Blood neutrophils automated count (number/volume)            4.6 10*3         
   1.8-7.8        

 

 Blood lymphocytes automated count (number/volume)            2.0 10*3         
   1.0-4.0        

 

 Blood monocytes automated count (number/volume)            0.7 10*3            
0.0-1.0        

 

 Automated eosinophil count            0.1 10*3/uL            0.0-0.3        

 

 Automated blood basophil count (count/volume)            0.0 10*3/uL          
  0.0-0.1        









 PT panel in platelet poor plasma by coagulation assay - 18 13:20         









 Prothrombin time (PT) in platelet poor plasma by coagulation assay            
13.3 s            12.2-14.7        

 

 INR in platelet poor plasma or blood by coagulation assay            1.0      
       0.8-1.4        









 Activated partial thromboplastin time (aPTT) in platelet poor plasma 
bycoagulation assay - 18 13:20         









 Activated partial thromboplastin time (aPTT) in platelet poor plasma 
bycoagulation assay            29 s            24-35        









 Comprehensive metabolic panel - 18 13:20         









 Serum or plasma sodium measurement (moles/volume)            136 mmol/L       
     135-145        

 

 Serum or plasma potassium measurement (moles/volume)            4.2 mmol/L    
        3.6-5.0        

 

 Serum or plasma chloride measurement (moles/volume)            101 mmol/L     
               

 

 Carbon dioxide            24 mmol/L            21-32        

 

 Serum or plasma anion gap determination (moles/volume)            11 mmol/L   
         5-14        

 

 Serum or plasma urea nitrogen measurement (mass/volume)            15 mg/dL   
         7-18        

 

 Serum or plasma creatinine measurement (mass/volume)            0.75 mg/dL    
        0.60-1.30        

 

 Serum or plasma urea nitrogen/creatinine mass ratio            20             
NRG        

 

 Serum or plasma creatinine measurement with calculation of estimated 
glomerular filtration rate            >             NRG        

 

 Serum or plasma glucose measurement (mass/volume)            105 mg/dL        
            

 

 Serum or plasma calcium measurement (mass/volume)            9.5 mg/dL        
    8.5-10.1        

 

 Serum or plasma total bilirubin measurement (mass/volume)            0.7 mg/dL
            0.1-1.0        

 

 Serum or plasma alkaline phosphatase measurement (enzymatic activity/volume)  
          81 U/L                    

 

 Serum or plasma aspartate aminotransferase measurement (enzymatic activity/
volume)            24 U/L            5-34        

 

 Serum or plasma alanine aminotransferase measurement (enzymatic activity/volume
)            15 U/L            0-55        

 

 Serum or plasma protein measurement (mass/volume)            7.4 g/dL         
   6.4-8.2        

 

 Serum or plasma albumin measurement (mass/volume)            4.2 g/dL         
   3.2-4.5        









 Magnesium - 18 13:20         









 Magnesium            2.0 mg/dL            1.8-2.4        









 Serum or plasma creatine kinase measurement (enzymatic activity/volume) -  13:20         









 Serum or plasma creatine kinase measurement (enzymatic activity/volume)       
     42 U/L                    









 Serum or plasma creatine kinase MB measurement (enzymatic activity/volume) -  13:20         









 Serum or plasma creatine kinase MB measurement (enzymatic activity/volume)    
        1.0 ng/mL            <6.6        









 Serum or plasma troponin i.cardiac measurement (mass/volume) - 18 13:20 
        









 Serum or plasma troponin i.cardiac measurement (mass/volume)            < ng/
mL            <0.30        









 Serum or plasma amylase measurement (enzymatic activity/volume) - 18 13:
20         









 Serum or plasma amylase measurement (enzymatic activity/volume)            67 U
/L                    









 Lipase - 18 13:20         









 Lipase            30 U/L            8-78        









 Serum or plasma lithium measurement (moles/volume) - 18 13:20         









 BNP level            152.8 pg/mL            <100.0        



                                                                               
           



Encounters

      





 ACCT No.            Visit Date/Time            Discharge            Status    
        Pt. Type            Provider            Facility            Loc./Unit  
          Complaint        

 

 [id number]            2013 15:52:00            2013 23:59:59          
  CLS            Outpatient            TARSHA STANLEY D[facility]                   
                            

 

 [id number]            2017 09:23:27            2017 23:59:59            
CLS            Outpatient                                                      
      

 

 [id number]            2017 16:00:00            2017 23:59:59    
        CLS            Outpatient            MOSES CHAIREZ MD            
[facility]                     

 

 [id number]            2017 16:31:00            02/10/2017 14:20:00    
        DIS            Inpatient            MOSES CHAIREZ MD            [facility] 
[facility]            4TH            HYPOKALEMIA,
HYPOMAGNESEMIA,UTI,DIZZINESS,THYROID        

 

 [id number]            2016 13:47:00            2016 23:59:59    
        CLS            Outpatient            ALON STOUT    
        [facility]            CARD            CAD,CAROTID 
ARTERY STENOSIS,HLP,HTN        

 

 [id number]            2016 21:30:00            2016 11:30:00    
        DIS            Inpatient            YADIRA MATHUR MD            [facility] 
[facility]            4TH            UTI, INTRACTABLE N/V, 
GENERALIZED WEAKNESS        

 

 [id number]            2016 12:49:00            2016 23:59:59    
        CLS            Outpatient            JUMA RENE DO            [facility] 
[facility]            RT            COPD,DYSPNEA ON EFFORT   
     

 

 [id number]            2015 14:25:00            2015 13:06:00    
        DIS            Inpatient            YADIRA MATHUR MD            [facility] 
[facility]            SURGICAL            RLL PNEUMONIA 
HYPOKALEMIA        

 

 [id number]            2015 14:15:00            2015 23:59:59    
        CLS            Preadmit            ANDREIA DOLL, BLAKE P            [facility] 
[facility]            RAD            HOARSNESS        

 

 [id number]            2013 09:32:00            2013 23:59:59    
        CLS            Outpatient            ANDREW DOLL, KALLI MACK            [facility] 
[facility]            LAB            CAD,HYPERTENSION,
HYPERLIPIDEMIA        

 

 [id number]            2013 13:47:00            2013 23:59:59    
        CLS            Outpatient                                              
              

 

 [id number]            2013 15:30:00            2013 15:45:00    
        DIS            Inpatient            PETE MCBRIDE DO            
[facility]            SURGICAL            DISLOCATION R 
SHOULDER        

 

 [id number]            2018 13:34:00                                   
   Document Registration                                                       
     

 

 [id number]            2016 22:09:00                                   
   Document Registration                                                       
     

 

 [id number]            2015 14:34:00                                   
   Document Registration                                                       
     

 

 [id number]            2013 10:18:00                                   
   Document Registration                                                       
     

 

 [id number]            02/10/2013 13:15:00                                   
   Document Registration                                                       
     

 

 [id number]            2013 16:22:00                                   
   Document Registration                                                       
     

 

 [id number]            2012 15:49:00                                   
   Document Registration                                                       
     

 

 [id number]            2011 09:31:00                                   
   Document Registration                                                       
     

 

 [id number]            2015 13:08:55                         [place]            
Document Registration

## 2018-04-28 NOTE — XMS REPORT
CCD document using C-CDA

 Created on: 2018



Omalley, Collette Y

External Reference #: [id number]

: 1931

Sex: Female



Demographics







 Address  [address]

[place]  [address]

 

 Home Phone  [phone]

 

 Preferred Language  English

 

 Marital Status  Unknown

 

 Hoahaoism Affiliation  Unknown

 

 Race  White

 

 Ethnic Group  Not  or 





Author







 Author  Nora Hernández

 

 Organization  Nora Hernández MD, [facility]

 

 Address  [address] [place]  [address]



 

 Phone  [phone]







Care Team Providers







 Care Team Member Name  Role  Phone

 

  PP  Unavailable

 

  CCM  Unavailable



                                            



Summary Purpose

          Interface Exchange                                                   
                 



Insurance Providers

                      





 Payer name                    Policy type / Coverage type                    
Covered party ID                    Effective Begin Date                    
Effective End Date                

 

 [employer] CLAIMS                    Commercial Insurance                    
[id number]                    Unknown                    Unknown                



                                                                              



Family history

                      



Mother            





 Diagnosis                    Age At Onset                

 

 Hypertension                    Unknown                

 

 Arthritis                    Unknown                



            



Father            





 Diagnosis                    Age At Onset                

 

 Accident                    Unknown                



                                                                               
         



Social History

                      





 Social History Element                    Codes                    Description
                    Effective Dates                

 

 Marital status                    Unknown                              
          2017                

 

 Number of children                    Unknown                    3            
        2017                

 

 Employment                    Unknown                    Retired              
      2017                

 

 Tobacco history                    SNOMED CT: 6692783                    Quit 
over 10 years ago

Quit in 2017                

 

 Alcohol history                    SNOMED CT: 132095                    
Currently drinks alcohol

 7 drinks/week                    2017                



                                                                               
                                                 



Allergies, Adverse Reactions, Alerts

          Allergies, Adverse Reactions, Alerts data not found                  
                                                  



Past Medical History

                      





 Illness                    Codes                    Condition Status          
          Onset Date                    Resolved Date                

 

                     Atrophy of thyroid (acquired)                             
         ICD-9: 244.8

ICD-10: E03.4                    Active                    2017          
          Unknown                

 

                     Dysuria                                      ICD-9: 788.1

ICD-10: R30.0                    Active                    2017          
          Unknown                

 

                     Encounter for immunization                                
      ICD-9: V03.89

ICD-10: Z23                    Active                    2017            
        Unknown                

 

                     Essential (primary) hypertension                          
            ICD-9: 401.1

ICD-10: I10                    Active                    2017            
        Unknown                

 

                     Mixed hyperlipidemia                                      
ICD-9: 272.2

ICD-10: E78.2                    Active                    2017          
          Unknown                

 

                     Hypertension                                      Unknown 
                   Active                    2017                    
Unknown                

 

                     Essential (primary) hypertension                          
            ICD-9: 401.9

ICD-10: I10                    Active                    2017            
        Unknown                

 

                     Chronic obstructive pulmonary disease, unspecified        
                              ICD-9: 496

ICD-10: J44.9                    Active                    2017          
          Unknown                

 

                     Hypokalemia                                      ICD-9: 
276.8

ICD-10: E87.6                    Active                    2017          
          Unknown                

 

                     Hypomagnesemia                                      ICD-9: 
275.2

ICD-10: E83.42                    Active                    2017         
           Unknown                

 

                     Hypothyroidism, unspecified                               
       ICD-9: 244.9

ICD-10: E03.9                    Active                    2017          
          Unknown                

 

                     Muscle weakness (generalized)                             
         ICD-9: 728.87

ICD-10: M62.81                    Active                    2017         
           Unknown                

 

                     Vitamin B12 deficiency anemia, unspecified                
                      ICD-9: 281.1

ICD-10: D51.9                    Active                    2017          
          Unknown                



                                                                               
                                                                               
                                                  



Problems

                      





 Condition                    Codes                    Effective Dates         
           Condition Status                

 

                     Atrophy of thyroid (acquired)                             
         ICD-9: 244.8

ICD-10: E03.4                    2017                    Active          
      

 

                     Dysuria                                      ICD-9: 788.1

ICD-10: R30.0                    2017                    Active          
      

 

                     Encounter for immunization                                
      ICD-9: V03.89

ICD-10: Z23                    2017                    Active            
    

 

                     Essential (primary) hypertension                          
            ICD-9: 401.1

ICD-10: I10                    2017                    Active            
    

 

                     Mixed hyperlipidemia                                      
ICD-9: 272.2

ICD-10: E78.2                    2017                    Active          
      

 

                     Hypertension                                      Unknown 
                   2017                    Active                

 

                     Essential (primary) hypertension                          
            ICD-9: 401.9

ICD-10: I10                    2017                    Active            
    

 

                     Chronic obstructive pulmonary disease, unspecified        
                              ICD-9: 496

ICD-10: J44.9                    2017                    Active          
      

 

                     Hypokalemia                                      ICD-9: 
276.8

ICD-10: E87.6                    2017                    Active          
      

 

                     Hypomagnesemia                                      ICD-9: 
275.2

ICD-10: E83.42                    2017                    Active         
       

 

                     Hypothyroidism, unspecified                               
       ICD-9: 244.9

ICD-10: E03.9                    2017                    Active          
      

 

                     Muscle weakness (generalized)                             
         ICD-9: 728.87

ICD-10: M62.81                    2017                    Active         
       

 

                     Vitamin B12 deficiency anemia, unspecified                
                      ICD-9: 281.1

ICD-10: D51.9                    2017                    Active          
      



                                                                               
                                                                               
                                                  



Medications

                      





 Medication                    Codes                    Instructions           
         Start Date                    Stop Date                    Status     
               Fill Instructions                

 

                     trazodone 50 mg tablet                                    
  RxNorm: 575903                    TAKE 1 TABLET BY MOUTH ONCE DAILY AT 
BEDTIME                    2018            
        Active                    Generic For:DESYREL 50 MG TABLET  2018 9
:28:33 AM                

 

                     levothyroxine 125 mcg tablet                              
        RxNorm: 716890                    1 Tablet(s) PO daily                 
   2017                    Active          
                          

 

                     Ventolin HFA 90 mcg/actuation aerosol inhaler             
                         RxNorm: 869663                    1-2 Puff(s) INH Q4 
PRN                    2017                    No Stop Date              
      Active                                    

 

                     trazodone 50 mg tablet                                    
  RxNorm: 307849                    TAKE 1 TABLET BY MOUTH ONCE DAILY AT 
BEDTIME                    2017            
        Inactive                    Generic For:DESYREL 50 MG TABLET  2017 9:09:05 AM                

 

                     Lipitor 10 mg tablet                                      
RxNorm: 620114                    1/2 Tablet(s) daily                    2018                    Active                   
                 

 

                     tolterodine 1 mg tablet                                   
   RxNorm: 941600                    1 Tablet(s) PO BID                    2017                    Inactive                
                    

 

                     Floranex 1 million cell tablet                            
          RxNorm:                     1 Tablet(s) PO AC                    2017                    Inactive                
                    

 

                     Lipitor 10 mg tablet                                      
RxNorm: 233547                    1 Tablet(s) PO QHS                    2017                    Inactive                 
                   

 

                     trazodone 50 mg tablet                                    
  RxNorm: 537441                    Take 1 Tablet (50 mg) by mouth daily at 
bedtime Patient needs to make an appt before any more refills will be given..  
                  2017                    
Inactive                    Generic For:DESYREL 50 MG TABLET  2017 8:57:
41 AM                

 

                     levothyroxine 125 mcg tablet                              
        RxNorm: 883673                    1 Tablet(s) PO daily                 
   2017                    Inactive        
                            

 

                     Floranex 1 million cell tablet                            
          RxNorm:                     1 Tablet(s) PO AC                    2017                    Inactive                
                    

 

                     Floranex 1 million cell tablet                            
          RxNorm:                     1 Tablet(s) PO AC                    2017                    Inactive                
                    

 

                     clopidogrel 75 mg tablet                                  
    RxNorm: 220676                    Tablet(s) PO daily                    2017                    Inactive              
                      

 

                     metoprolol tartrate 25 mg tablet                          
            RxNorm: 724107                    1 Tablet(s) PO BID               
     2017                    Inactive      
                              

 

                     pantoprazole 40 mg tablet,delayed release                 
                     RxNorm: 816176                    1 Tablet(s) PO daily    
                2017                    
Inactive                                    

 

                     enalapril maleate 10 mg tablet                            
          RxNorm: 577875                    1 Tablet(s) PO BID                 
   2017                    Inactive        
                            

 

                     montelukast 10 mg tablet                                  
    RxNorm: 360771                    1 Tablet(s) PO daily                    2017                    Inactive             
                       

 

                     promethazine 12.5 mg tablet                               
       RxNorm: 884867                    1 Tablet(s) PO Q8 PRN                 
   2017                    No Stop Date                    Active        
                            

 

                     verapamil  mg 24 hr capsule,extended release        
                              RxNorm: 752059                    1 Capsule(s) PO 
daily                    2017              
      Inactive                                    

 

                     trazodone 50 mg tablet                                    
  RxNorm: 117934                    1 Tablet(s) PO QHS                    2017                    Inactive                 
                   

 

                     levothyroxine 125 mcg tablet                              
        RxNorm: 016223                    1 Tablet(s) PO daily                 
   2017                    Inactive        
                            

 

                     Zofran ODT 4 mg disintegrating tablet                     
                 RxNorm: 301775                    1 Tablet(s) PO Q6 as needed 
                   2017                    
Inactive                                    

 

                     Lomotil 2.5 mg-0.025 mg tablet                            
          RxNorm: 7525186                    1 Tablet(s) PO Q6 as needed       
             2017                    
Inactive                                    

 

                     tolterodine 1 mg tablet                                   
   RxNorm: 028095                    1 Tablet(s) PO BID                    2017                    Inactive                
                    

 

                     Lipitor 10 mg tablet                                      
RxNorm: 431834                    1 Tablet(s) PO QHS                    2017                    Inactive                 
                   

 

                     Lomotil 2.5 mg-0.025 mg tablet                            
          RxNorm: 2416788                    1 Tablet(s) PO Q6 as needed       
             2017                    
Inactive                                    

 

                     Zofran ODT 4 mg disintegrating tablet                     
                 RxNorm: 356089                    1 Tablet(s) PO BID as needed
                    2017                    
Inactive                                    

 

                     Lipitor 10 mg tablet                                      
RxNorm: 949738                    1 Tablet(s) PO QHS                    2017                    Inactive                 
                   

 

                     tolterodine 1 mg tablet                                   
   RxNorm: 784662                    1 Tablet(s) PO BID                    2017                    Inactive                
                    

 

                     Vitamin B-12 1,000 mcg tablet                             
         RxNorm: 118737                    1 Tablet(s) PO daily                
    No Start Date                                         Active               
                     

 

                     meclizine 12.5 mg tablet                                  
    RxNorm: 789671                    1 Tablet(s) PO as needed dizziness       
             No Start Date                                         Active      
                              

 

                     Ventolin HFA 90 mcg/actuation aerosol inhaler             
                         RxNorm: 380290                    1-2 Puff(s) INH Q4 
PRN                    No Start Date                    2017             
       Inactive                                    



                                                                               
                                                                               
                                                                               
                                                                               
                                                                        



Medication Administered

          No Medication Administered data                                      
                              



Immunizations

                      





 Vaccine                    Codes                    Date                    
Status                

 

 Influenza                    CVX: 141                    2017           
         completed                



                                                                              



Assessments

                      





 Condition                    Codes                    Effective Dates         
       

 

 Mixed hyperlipidemia                    ICD-10: E78.2

ICD-9: 272.2                    2017                

 

 Atrophy of thyroid (acquired)                    ICD-10: E03.4

ICD-9: 244.8                    2017                

 

 Dysuria                    ICD-10: R30.0

ICD-9: 788.1                    2017                

 

 Essential (primary) hypertension                    ICD-10: I10

ICD-9: 401.1                    2017                

 

 Encounter for immunization                    ICD-10: Z23

ICD-9: V03.89                    2017                

 

 Essential (primary) hypertension                    ICD-10: I10

ICD-9: 401.9                    2017                

 

 Hypomagnesemia                    ICD-10: E83.42

ICD-9: 275.2                    2017                

 

 Chronic obstructive pulmonary disease, unspecified                    ICD-10: 
J44.9

ICD-9: 496                    2017                

 

 Hypothyroidism, unspecified                    ICD-10: E03.9

ICD-9: 244.9                    2017                

 

 Hypokalemia                    ICD-10: E87.6

ICD-9: 276.8                    2017                

 

 Muscle weakness (generalized)                    ICD-10: M62.81

ICD-9: 728.87                    2017                

 

 Vitamin B12 deficiency anemia, unspecified                    ICD-10: D51.9

ICD-9: 281.1                    2017                



                                                                    



Reason For Visit

                      





 Reason For Visit                    Effective Dates                    Notes  
              

 

 hypothyroid                    2017                                    

 

 dizziness                    2017                                    

 

 dizziness                    2017                                    



                                                                              



Results

                      





 Observation                    Observation Code                    Item       
             Item Code                    Result                    Date       
         

 

 Cbc With Differential                    Ord2                    WBC          
                              7.08 K/ul                    08/10/2017          
      

 

 Cbc With Differential                    Ord2                    RBC          
                              4.23 M/ul                    08/10/2017          
      

 

 Cbc With Differential                    Ord2                    HGB          
                              14.0 g/dl                    08/10/2017          
      

 

 Cbc With Differential                    Ord2                    Neut%        
                                52.4 %                    08/10/2017           
     

 

 Cbc With Differential                    Ord2                    HCT          
                              40.2 %                    08/10/2017             
   

 

 Cbc With Differential                    Ord2                    MCV          
                              95.0 fl                    08/10/2017            
    

 

 Cbc With Differential                    Ord2                    Lymph%       
                                 32.5 %                    08/10/2017          
      

 

 Cbc With Differential                    Ord2                    MCH          
                              33.1 pg                    08/10/2017            
    

 

 Cbc With Differential                    Ord2                    Mono%        
                                10.3 %                    08/10/2017           
     

 

 Cbc With Differential                    Ord2                    MCHC         
                               34.8 pg                    08/10/2017           
     

 

 Cbc With Differential                    Ord2                    Eos%         
                               4.7 %                    08/10/2017             
   

 

 Cbc With Differential                    Ord2                    PLT          
                              249 K/ul                    08/10/2017           
     

 

 Cbc With Differential                    Ord2                    Baso%        
                                0.1 %                    08/10/2017            
    

 

 Cbc With Differential                    Ord2                    RDW          
                              13.1 %                    08/10/2017             
   

 

 Cbc With Differential                    Ord2                    Neut ABS#    
                                    3.71 K/ul                    08/10/2017    
            

 

 Cbc With Differential                    Ord2                    Lymph ABS#   
                                     2.30 K/ul                    08/10/2017   
             

 

 Cbc With Differential                    Ord2                    Mono ABS#    
                                    0.7 K/ul                    08/10/2017     
           

 

 Cbc With Differential                    Ord2                    Eos ABS#     
                                   0.3 K/ul                    08/10/2017      
          

 

 Cbc With Differential                    Ord2                    Baso ABS#    
                                    0.0 K/ul                    08/10/2017     
           

 

 Comp Metabolic                    [id number]                    NA                
                        133 mEq/L                    08/10/2017                

 

 Comp Metabolic                    [id number]                    K                 
                       4.1 mEq/L                    08/10/2017                

 

 Comp Metabolic                    [id number]                    CL                
                        95 mEq/L                    08/10/2017                

 

 Comp Metabolic                    [id number]                    CO2               
                         28.0 mEq/L                    08/10/2017              
  

 

 Comp Metabolic                    Zac920                    ANION GAP         
                               14                     08/10/2017                

 

 Comp Metabolic                    Tvt347                    GLUCOSE           
                             85 mg/dL                    08/10/2017            
    

 

 Comp Metabolic                    Lii949                    Creat             
                           0.7 mg/dL                    08/10/2017             
   

 

 Comp Metabolic                    Ffv590                    eGFR              
                          82 ml/min/1.73m2                    08/10/2017       
         

 

 Comp Metabolic                    Psy766                    BUN               
                         8 mg/dL                    08/10/2017                

 

 Comp Metabolic                    Mlb914                    B/C Ratio         
                               11.1 Ratio                    08/10/2017        
        

 

 Comp Metabolic                    Ynn893                    CALCIUM           
                             8.9 mg/dL                    08/10/2017           
     

 

 Comp Metabolic                    Tbt284                    ALK PHOS          
                              66 U/L                    08/10/2017             
   

 

 Comp Metabolic                    Jca404                    AST(SGOT)         
                               14 U/L                    08/10/2017            
    

 

 Comp Metabolic                    Syh160                    ALT(SGPT)         
                               8 U/L                    08/10/2017             
   

 

 Comp Metabolic                    Zcq506                    BILI T            
                            0.6 mg/dL                    08/10/2017            
    

 

 Comp Metabolic                    Tuv175                    ALBUMIN           
                             4.0 g/dL                    08/10/2017            
    

 

 Comp Metabolic                    Iyv600                    TPRO              
                          6.3 g/dL                    08/10/2017                

 

 Comp Metabolic                    Tij534                    GLOB              
                          2.4 g/dL                    08/10/2017                

 

 Comp Metabolic                    Dkw784                    A/G Ratio         
                               1.7 Ratio                    08/10/2017         
       

 

 Comp Metabolic                    Frh842                    Osmo              
                          264 mOsmo                    08/10/2017              
  

 

 Lipid                    Ord30                    CHOL                        
                126 mg/dL                    08/10/2017                

 

 Lipid                    Ord30                    HDL                         
               68.0 mg/dl                    08/10/2017                

 

 Lipid                    Ord30                    TRIG                        
                121 mg/dL                    08/10/2017                

 

 Lipid                    Ord30                    LDL                         
               34 mg/dL                    08/10/2017                

 

 Lipid                    Ord30                    C/HDL                       
                 1.9 Ratio                    08/10/2017                

 

 Free T4                    Kbm020                    FREE T4                  
                      1.52 ng/dL                    08/10/2017                

 

 Tsh                    Ord6                    hTSH II                        
                0.23 uIU/mL                    08/10/2017                

 

 Comp Metabolic                    Ovu266                    NA                
                        136 mEq/L                    2017                

 

 Comp Metabolic                    Qlc483                    K                 
                       3.7 mEq/L                    2017                

 

 Comp Metabolic                    Flc427                    CL                
                        97 mEq/L                    2017                

 

 Comp Metabolic                    Ojy543                    CO2               
                         31.0 mEq/L                    2017              
  

 

 Comp Metabolic                    Vsd042                    ANION GAP         
                               12                     2017                

 

 Comp Metabolic                    Oah231                    GLUCOSE           
                             99 mg/dL                    2017            
    

 

 Comp Metabolic                    Gxd274                    Creat             
                           0.9 mg/dL                    2017             
   

 

 Comp Metabolic                    Dxa359                    eGFR              
                          67 ml/min/1.73m2                    2017       
         

 

 Comp Metabolic                    Soc089                    BUN               
                         11 mg/dL                    2017                

 

 Comp Metabolic                    Tyy991                    B/C Ratio         
                               12.9 Ratio                    2017        
        

 

 Comp Metabolic                    Hwv930                    CALCIUM           
                             8.8 mg/dL                    2017           
     

 

 Comp Metabolic                    Bbx046                    ALK PHOS          
                              57 U/L                    2017             
   

 

 Comp Metabolic                    Kcw291                    AST(SGOT)         
                               14 U/L                    2017            
    

 

 Comp Metabolic                    Ssy125                    ALT(SGPT)         
                               9 U/L                    2017             
   

 

 Comp Metabolic                    Dqb081                    BILI T            
                            0.6 mg/dL                    2017            
    

 

 Comp Metabolic                    Itg959                    ALBUMIN           
                             3.6 g/dL                    2017            
    

 

 Comp Metabolic                    Gpd634                    TPRO              
                          5.9 g/dL                    2017                

 

 Comp Metabolic                    Uft864                    GLOB              
                          2.3 g/dL                    2017                

 

 Comp Metabolic                    Jgu352                    A/G Ratio         
                               1.5 Ratio                    2017         
       

 

 Comp Metabolic                    Ura287                    Osmo              
                          271 mOsmo                    2017              
  

 

 Tsh                    Ord6                    hTSH II                        
                2.20 uIU/mL                    2017                

 

 B12                    Xex835                    B12                          
              >1500.00 pg/ml                    2017                

 

 Cbc With Differential                    Ord2                    WBC          
                              9.12 K/ul                    2017          
      

 

 Cbc With Differential                    Ord2                    RBC          
                              4.00 M/ul                    2017          
      

 

 Cbc With Differential                    Ord2                    HGB          
                              13.2 g/dl                    2017          
      

 

 Cbc With Differential                    Ord2                    HCT          
                              39.4 %                    2017             
   

 

 Cbc With Differential                    Ord2                    Neut%        
                                62.9 %                    2017           
     

 

 Cbc With Differential                    Ord2                    Lymph%       
                                 23.4 %                    2017          
      

 

 Cbc With Differential                    Ord2                    MCV          
                              98.5 fl                    2017            
    

 

 Cbc With Differential                    Ord2                    MCH          
                              33.0 pg                    2017            
    

 

 Cbc With Differential                    Ord2                    Mono%        
                                10.9 %                    2017           
     

 

 Cbc With Differential                    Ord2                    Eos%         
                               2.7 %                    2017             
   

 

 Cbc With Differential                    Ord2                    MCHC         
                               33.5 pg                    2017           
     

 

 Cbc With Differential                    Ord2                    Baso%        
                                0.1 %                    2017            
    

 

 Cbc With Differential                    Ord2                    PLT          
                              276 K/ul                    2017           
     

 

 Cbc With Differential                    Ord2                    RDW          
                              13.4 %                    2017             
   

 

 Cbc With Differential                    Ord2                    Neut ABS#    
                                    5.74 K/ul                    2017    
            

 

 Cbc With Differential                    Ord2                    Lymph ABS#   
                                     2.13 K/ul                    2017   
             

 

 Cbc With Differential                    Ord2                    Mono ABS#    
                                    1.0 K/ul                    2017     
           

 

 Cbc With Differential                    Ord2                    Eos ABS#     
                                   0.3 K/ul                    2017      
          

 

 Cbc With Differential                    Ord2                    Baso ABS#    
                                    0.0 K/ul                    2017     
           

 

 Magnesium                    Ord90                    Mag                     
                   1.7 mg/dL                    2017                

 

 Urine Culture                    Ucult                    Complete            
                            NO Growth Day 2                     10/22/2016     
           

 

 Urine Culture                    Ucult                    Preliminary         
                               NO Growth Day 1                     10/22/2016  
              

 

 Urinalysis                    Ord28                    U-Color                
                        Orange-- Color May Affect Dipstick Results             
        10/20/2016                

 

 Urinalysis                    Ord28                    U-Clarity              
                          Cloudy                     10/20/2016                

 

 Urinalysis                    Ord28                    U-Gluc                 
                       250 mg/dL                     10/20/2016                

 

 Urinalysis                    Ord28                    U-Bili                 
                       Large                     10/20/2016                

 

 Urinalysis                    Ord28                    U-Ketone               
                         40 mg/dL                     10/20/2016                

 

 Urinalysis                    Ord28                    U-SG                   
                     <=1.005                     10/20/2016                

 

 Urinalysis                    Ord28                    U-Blood                
                        Negative                     10/20/2016                

 

 Urinalysis                    Ord28                    U-pH                   
                     5.0                     10/20/2016                

 

 Urinalysis                    Ord28                    U-Protein              
                          >=300 mg/dL                     10/20/2016           
     

 

 Urinalysis                    Ord28                    U-Urobilin             
                           >=8.0 E.U./dL E.U./dL                    10/20/2016 
               

 

 Urinalysis                    Ord28                    U-Nitrites             
                           Positive                     10/20/2016             
   

 

 Urinalysis                    Ord28                    U-Leuk                 
                       Large                     10/20/2016                

 

 Urinalysis                    Ord28                    U-Bact                 
                       1+                     10/20/2016                

 

 Urinalysis                    Ord28                    U-Squamous Epi         
                               0-5 per/HPF                    10/20/2016       
         

 

 Urinalysis                    Ord28                    U-Crystal              
                          None per/HPF                    10/20/2016           
     

 

 Urinalysis                    Ord28                    U-Mucus                
                        None                     10/20/2016                

 

 Urinalysis                    Ord28                    U-Renal tubular epi    
                                    None                     10/20/2016        
        

 

 Urinalysis                    Ord28                    U-RBC                  
                      5-10 per/HPF                    10/20/2016                

 

 Urinalysis                    Ord28                    U-Transitional epi     
                                   None per/HPF                    10/20/2016  
              

 

 Urinalysis                    Ord28                    U-WBC                  
                      TNTC per/HPF                    10/20/2016                

 

 Urinalysis                    Ord28                    U-Cast                 
                       None per/HPF                    10/20/2016              
  

 

 Urinalysis                    Ord28                    U-VOL                  
                      VOLUME INSUFFICIENT (<10mL) RESULTS MAY BE AFFECTED      
               10/20/2016                

 

 Urinalysis                    Ord28                    U-Yeast                
                        NEGATIVE                     10/20/2016                

 

 Urinalysis                    Ord28                    U-Com                  
                      Culture to follow                     10/20/2016         
       



                                                                               
                                                                               
                              



Review of Systems

                      





 System                    Result                    Effective Dates           
     

 

 Constitutional                    No anorexia                    2017   
             

 

 Constitutional                    No night sweats                    2017                

 

 Constitutional                    No chills                    2017     
           

 

 Constitutional                    No diaphoresis                    2017
                

 

 Constitutional                    fatigue                    2017       
         

 

 Constitutional                    No fever                    2017      
          

 

 Constitutional                    No insomnia                    2017   
             

 

 Constitutional                    No malaise                    2017    
            

 

 Eyes                    No eye discharge                    2017        
        

 

 Eyes                    No eye erythema                    2017         
       

 

 Ears/Nose/Throat/Neck                    dizziness                    2017                

 

 Ears/Nose/Throat/Neck                    No headache                    2017                

 

 Cardiovascular                    No chest pain/pressure                                    

 

 Respiratory                    No productive sputum                    2017                

 

 Respiratory                    No cigarette smoking                    2017                

 

 Respiratory                    dyspnea on exertion                    2017                

 

 Respiratory                    No dyspnea                    2017       
         

 

 Gastrointestinal                    No abdominal pain                    2017                

 

 Gastrointestinal                    No constipation                    2017                

 

 Gastrointestinal                    No diarrhea                    2017 
               

 

 Genitourinary/Nephrology                    dysuria                    2017                

 

 Musculoskeletal                    back pain                    2017    
            

 

 Musculoskeletal                    No joint complaint                    2017                

 

 Dermatologic                    No rash                    2017         
       

 

 Neurologic                    No alteration of consciousness                  
  2017                

 

 Psychiatric                    No anxiety                    2017       
         

 

 Psychiatric                    No depression                    2017    
            

 

 Constitutional                    No recent illness                    2017                

 

 Neurologic                    memory loss                    2017       
         

 

 Constitutional                    No anorexia                    2017   
             

 

 Constitutional                    No night sweats                    2017                

 

 Constitutional                    No chills                    2017     
           

 

 Constitutional                    No diaphoresis                    2017
                

 

 Constitutional                    fatigue                    2017       
         

 

 Constitutional                    No fever                    2017      
          

 

 Constitutional                    No insomnia                    2017   
             

 

 Constitutional                    No malaise                    2017    
            

 

 Eyes                    No eye discharge                    2017        
        

 

 Eyes                    No eye erythema                    2017         
       

 

 Ears/Nose/Throat/Neck                    dizziness                    2017                

 

 Ears/Nose/Throat/Neck                    No headache                    2017                

 

 Cardiovascular                    No chest pain/pressure                                    

 

 Respiratory                    No productive sputum                    2017                

 

 Respiratory                    No cigarette smoking                    2017                

 

 Respiratory                    cough                    2017            
    

 

 Respiratory                    dyspnea on exertion                    2017                

 

 Respiratory                    No dyspnea                    2017       
         

 

 Gastrointestinal                    No abdominal pain                    2017                

 

 Gastrointestinal                    No constipation                    2017                

 

 Gastrointestinal                    No diarrhea                    2017 
               

 

 Gastrointestinal                    nausea                    2017      
          

 

 Genitourinary/Nephrology                    No dysuria                                    

 

 Musculoskeletal                    back pain                    2017    
            

 

 Musculoskeletal                    No joint complaint                    2017                

 

 Dermatologic                    No rash                    2017         
       

 

 Neurologic                    No alteration of consciousness                  
  2017                

 

 Psychiatric                    No anxiety                    2017       
         

 

 Psychiatric                    No depression                    2017    
            

 

 Constitutional                    recent illness                    2017
                

 

 Constitutional                    No anorexia                    2017   
             

 

 Constitutional                    No night sweats                    2017                

 

 Constitutional                    No chills                    2017     
           

 

 Constitutional                    No diaphoresis                    2017
                

 

 Constitutional                    fatigue                    2017       
         

 

 Constitutional                    No fever                    2017      
          

 

 Constitutional                    No insomnia                    2017   
             

 

 Constitutional                    No malaise                    2017    
            

 

 Constitutional                    No weight loss                    2017
                

 

 Constitutional                    No weight gain                    2017
                

 

 Eyes                    No eye discharge                    2017        
        

 

 Eyes                    No eye erythema                    2017         
       

 

 Ears/Nose/Throat/Neck                    dizziness                    2017                

 

 Ears/Nose/Throat/Neck                    No headache                    2017                

 

 Cardiovascular                    No chest pain/pressure                                    

 

 Respiratory                    No productive sputum                    2017                

 

 Respiratory                    cough                    2017            
    

 

 Respiratory                    No cigarette smoking                    2017                

 

 Respiratory                    No dyspnea                    2017       
         

 

 Respiratory                    dyspnea on exertion                    2017                

 

 Gastrointestinal                    No abdominal pain                    2017                

 

 Gastrointestinal                    No constipation                    2017                

 

 Gastrointestinal                    No diarrhea                    2017 
               

 

 Gastrointestinal                    nausea                    2017      
          

 

 Genitourinary/Nephrology                    No dysuria                                    

 

 Musculoskeletal                    No joint complaint                    2017                

 

 Dermatologic                    No rash                    2017         
       

 

 Neurologic                    No alteration of consciousness                  
  2017                

 

 Psychiatric                    No anxiety                    2017       
         

 

 Musculoskeletal                    back pain                    2017    
            

 

 Psychiatric                    No depression                    2017    
            

 

 Endocrine                    No dry or coarse skin                    2017                



                                                                    



Physical Exam

                      





 Exam Name                    System Name                    Item Name         
           Status                    Result                    Effective Dates 
                   Notes                

 

 Full Exam - General                     Constitutional                     
general appearance                    Overall:                    well 
developed                    2017                    None                

 

 Full Exam - General                     Constitutional                     
general appearance                    Overall:                    in no acute 
distress                    2017                    None                

 

 Full Exam - General                     Constitutional                     
general appearance                    Overall:                    well 
nourished                    2017                    None                

 

 Full Exam - General                     Eyes                    conjunctiva
/eyelids                    Overall:                    conjunctiva clear      
              2017                    None                

 

 Full Exam - General                     Eyes                    pupils and 
irises                    Overall:                    pupils equal, round, 
reactive to light and accomodation                    2017               
     None                

 

 Full Exam - General                     Ears/Nose/Throat                  
  otoscopic exam                    Overall:                    external 
auditory canals clear                    2017                    None    
            

 

 Full Exam - General                     Ears/Nose/Throat                  
  otoscopic exam                    Overall:                    tympanic 
membranes clear                    2017                    None          
      

 

 Full Exam - General                     Ears/Nose/Throat                  
  lips/teeth/gingiva                    Teeth:                    wears 
dentures                    2017                    None                

 

 Full Exam - General                     Ears/Nose/Throat                  
  oral cavity/pharynx/larynx                     Overall:                    
oral mucosa clear                    2017                    None        
        

 

 Full Exam - General                     Respiratory                    
auscultation                    Overall:                    breath sounds clear 
bilaterally                    2017                    None              
  

 

 Full Exam - General                     Respiratory                    
respiratory effort/rhythm                    Overall:                    no 
retractions                    2017                    None              
  

 

 Full Exam - General                     Respiratory                    
respiratory effort/rhythm                    Overall:                    normal 
rate                    2017                    None                

 

 Full Exam - General                     Cardiovascular                    
extremities                    Overall:                    no clubbing         
           2017                    None                

 

 Full Exam - General                     Cardiovascular                    
auscultation of heart                    Overall:                    regular 
rate                    2017                    None                

 

 Full Exam - General                     Cardiovascular                    
auscultation of heart                    Overall:                    normal 
heart sounds                    2017                    None             
   

 

 Full Exam - General                     Abdomen                    
abdominal exam                    Overall:                    no tenderness    
                2017                    None                

 

 Full Exam - General                     Abdomen                    
abdominal exam                    Overall:                    normal bowel 
sounds                    2017                    None                

 

 Full Exam - General                     Lymphatic                    neck 
nodes                    Overall:                    anterior cervical chain 
benign                    2017                    None                

 

 Full Exam - General                     Lymphatic                    neck 
nodes                    Overall:                    posterior cervical chain 
benign                    2017                    None                

 

 Full Exam - General                     Musculoskeletal                    
gait and station                    Station:                    kyphosis       
             2017                    None                

 

 Full Exam - General                     Neurologic                    
cranial nerves                    Overall:                    crainial nerves 2 
- 12 grossly intact                    2017                    None      
          

 

 Full Exam - General                     Psychiatric                    
orientation/consciousness                    Overall:                    
oriented to person, place and time                    2017               
     None                

 

 Full Exam - General                     Constitutional                     
general appearance                    Overall:                    well 
developed                    2017                    None                

 

 Full Exam - General                     Constitutional                     
general appearance                    Overall:                    in no acute 
distress                    2017                    None                

 

 Full Exam - General                     Constitutional                     
general appearance                    Overall:                    well 
nourished                    2017                    None                

 

 Full Exam - General                     Eyes                    conjunctiva
/eyelids                    Overall:                    conjunctiva clear      
              2017                    None                

 

 Full Exam - General                     Eyes                    pupils and 
irises                    Overall:                    pupils equal, round, 
reactive to light and accomodation                    2017               
     None                

 

 Full Exam - General                     Ears/Nose/Throat                  
  otoscopic exam                    Overall:                    external 
auditory canals clear                    2017                    None    
            

 

 Full Exam - General                     Ears/Nose/Throat                  
  otoscopic exam                    Overall:                    tympanic 
membranes clear                    2017                    None          
      

 

 Full Exam - General                     Ears/Nose/Throat                  
  lips/teeth/gingiva                    Teeth:                    wears 
dentures                    2017                    None                

 

 Full Exam - General                     Ears/Nose/Throat                  
  oral cavity/pharynx/larynx                     Overall:                    
oral mucosa clear                    2017                    None        
        

 

 Full Exam - General                     Respiratory                    
auscultation                    Overall:                    breath sounds clear 
bilaterally                    2017                    None              
  

 

 Full Exam - General                     Respiratory                    
respiratory effort/rhythm                    Overall:                    no 
retractions                    2017                    None              
  

 

 Full Exam - General                     Respiratory                    
respiratory effort/rhythm                    Overall:                    normal 
rate                    2017                    None                

 

 Full Exam - General                     Cardiovascular                    
extremities                    Overall:                    no clubbing         
           2017                    None                

 

 Full Exam - General                     Cardiovascular                    
auscultation of heart                    Overall:                    regular 
rate                    2017                    None                

 

 Full Exam - General                     Cardiovascular                    
auscultation of heart                    Overall:                    normal 
heart sounds                    2017                    None             
   

 

 Full Exam - General                     Abdomen                    
abdominal exam                    Overall:                    no tenderness    
                2017                    None                

 

 Full Exam - General                     Abdomen                    
abdominal exam                    Overall:                    normal bowel 
sounds                    2017                    None                

 

 Full Exam - General                     Lymphatic                    neck 
nodes                    Overall:                    anterior cervical chain 
benign                    2017                    None                

 

 Full Exam - General                     Lymphatic                    neck 
nodes                    Overall:                    posterior cervical chain 
benign                    2017                    None                

 

 Full Exam - General                     Neurologic                    
cranial nerves                    Overall:                    crainial nerves 2 
- 12 grossly intact                    2017                    None      
          

 

 Full Exam - General                     Psychiatric                    
orientation/consciousness                    Overall:                    
oriented to person, place and time                    2017               
     None                

 

 Full Exam - General                     Musculoskeletal                    
gait and station                    Station:                    kyphosis       
             2017                    None                

 

 Full Exam - General                     Constitutional                     
general appearance                    Overall:                    well 
developed                    2017                    None                

 

 Full Exam - General                     Constitutional                     
general appearance                    Overall:                    in no acute 
distress                    2017                    None                

 

 Full Exam - General                     Constitutional                     
general appearance                    Overall:                    well 
nourished                    2017                    None                

 

 Full Exam - General                     Psychiatric                    
orientation/consciousness                    Overall:                    
oriented to person, place and time                    2017               
     None                

 

 Full Exam - General                     Neurologic                    
cranial nerves                    Overall:                    crainial nerves 2 
- 12 grossly intact                    2017                    None      
          

 

 Full Exam - General                     Integument                    
inspection of skin                    Overall:                    few scattered 
moles, no gross abnormalities                    2017                    
None                

 

 Full Exam - General                     Musculoskeletal                    
gait and station                    Station:                    kyphosis       
             2017                    None                

 

 Full Exam - General                     Lymphatic                    neck 
nodes                    Overall:                    posterior cervical chain 
benign                    2017                    None                

 

 Full Exam - General                     Lymphatic                    neck 
nodes                    Overall:                    anterior cervical chain 
benign                    2017                    None                

 

 Full Exam - General                     Abdomen                    
abdominal exam                    Overall:                    normal bowel 
sounds                    2017                    None                

 

 Full Exam - General                     Abdomen                    
abdominal exam                    Overall:                    no tenderness    
                2017                    None                

 

 Full Exam - General                     Cardiovascular                    
auscultation of heart                    Overall:                    normal 
heart sounds                    2017                    None             
   

 

 Full Exam - General                     Cardiovascular                    
auscultation of heart                    Overall:                    regular 
rate                    2017                    None                

 

 Full Exam - General                     Cardiovascular                    
extremities                    Overall:                    no clubbing         
           2017                    None                

 

 Full Exam - General                     Respiratory                    
auscultation                    Overall:                    breath sounds clear 
bilaterally                    2017                    None              
  

 

 Full Exam - General                     Respiratory                    
respiratory effort/rhythm                    Overall:                    no 
retractions                    2017                    None              
  

 

 Full Exam - General                     Respiratory                    
respiratory effort/rhythm                    Overall:                    normal 
rate                    2017                    None                

 

 Full Exam - General                     Ears/Nose/Throat                  
  otoscopic exam                    Overall:                    external 
auditory canals clear                    2017                    None    
            

 

 Full Exam - General                     Ears/Nose/Throat                  
  otoscopic exam                    Overall:                    tympanic 
membranes clear                    2017                    None          
      

 

 Full Exam - General                     Ears/Nose/Throat                  
  oral cavity/pharynx/larynx                     Overall:                    
oral mucosa clear                    2017                    None        
        

 

 Full Exam - General                     Ears/Nose/Throat                  
  lips/teeth/gingiva                    Teeth:                    wears 
dentures                    2017                    None                

 

 Full Exam - General                     Eyes                    conjunctiva
/eyelids                    Overall:                    conjunctiva clear      
              2017                    None                

 

 Full Exam - General                     Eyes                    pupils and 
irises                    Overall:                    pupils equal, round, 
reactive to light and accomodation                    2017               
     None                



                                                                              



Procedures

                      





 Procedure                    Codes                    Date                

 

                     URINALYSIS NONAUTO W/O SCOPE                              
        CPT-4: 49906                    2017                

 

                     FLU VACC PRSV FREE INC ANTIG                              
        CPT-4: 98158                    2017                

 

                     IMMUNIZATION ADMIN                                      CPT
-4: 79096                    2017                

 

                     THER/PROPH/DIAG INJ SC/IM                                 
     CPT-4: 97391                    2017                

 

                     VITAMIN B12 INJECTION                                      
CPT-4:                     2017                



                                                                               
                                       



Vital Signs

                      





 Date                    Vital                









 2017                                        Blood Pressure 1: 130/78 Code
: 8480-6                                                          BMI: 21.2 Code
: 15194-1                                                          Heart Rate 1
: 52 bpm                                                          Height: 5'2" 
                                                         SpO2: 92%             
                                             Weight: 116 lbs                   
                

 

 2017                                        Blood Pressure 1: 138/80 Code
: 8480-6                                                          BMI: 21.4 Code
: 68531-7                                                          Heart Rate 1
: 65 bpm                                                          Height: 5'2" 
                                                         SpO2: 92%             
                                             Weight: 117 lbs                   
                









 2017                                        Blood Pressure 1: 118/68 Code
: 8480-6                                                          BMI: 21.4 Code
: 70770-4                                                          Heart Rate 1
: 62 bpm                                                          Height: 5'2" 
                                                         SpO2: 92%             
                                             Temperature: 37.7 (C) / 99.8 (F)  
                                                        Weight: 117 lbs        
                           



                                                                               
                             



Functional Status

          No Functional Status data                                            
              



History of Present Illness

                      





 Symptom Name                    Status                    Result              
      Effective Date                    Notes                

 

 hypertension                    Onset and Resolution                    
ongoing                    2017                    None                

 

 hypertension                    Onset of Symptom                    during 
adulthood                    2017                    None                

 

 hypertension                    Blood Pressure Values                    not 
checking blood pressure at home                    2017                  
  None                

 

 hypertension                    Alleviating Factors                    
medication                    2017                    None                

 

 hypertension                    Pertinent Findings                    
decreased energy                    2017                    None         
       

 

 hypertension                    Pertinent Findings                    Denies 
dizziness                    2017                    None                

 

 hypertension                    Pertinent Findings                    dyspnea 
                   2017                    "not more than usual"- has 
oxygen at home- uses as needed                 

 

 hypertension                    Pertinent Findings                    Denies 
edema                    2017                    None                

 

 hypertension                    Quality                    primary 
hypertension                    2017                    None             
   

 

 hypothyroid                    Onset and Resolution                    ongoing
                    2017                    None                

 

 hypothyroid                    Alleviating Factors                    
medication                    2017                    None                

 

 dysuria                    Quality                    acute                    
2017                    None                

 

 dysuria                    Quality                    intermittent            
        2017                    None                

 

 dysuria                    Onset of Symptom                    ~2-3 weeks ago 
                   2017                    None                

 

 dysuria                    Pertinent Findings                    bladder pain 
                   2017                    None                

 

 dizziness                    Quality                    chronic               
     2017                    None                

 

 dizziness                    Quality                    imbalance             
       2017                    None                

 

 dizziness                    Quality                    intermittent          
          2017                    None                

 

 dizziness                    Quality                    sense of motion       
             2017                    None                

 

 dizziness                    Quality                    sense of room spinning
                    2017                    None                

 

 dizziness                    Quality                    spinning              
      2017                    None                

 

 dizziness                    Onset and Resolution                    ongoing  
                  2017                    None                

 

 dizziness                    Significant Family History                    
hypertension                    2017                    None             
   

 

 dizziness                    Pertinent Findings                    
lightheadedness                    2017                    None          
      

 

 dizziness                    Pertinent Findings                    problems 
with coordination                    2017                    None        
        

 

 weakness                    Quality                    upper extremities      
              2017                    None                

 

 weakness                    Quality                    lower extremities      
              2017                    None                

 

 weakness                    Quality                    decreased energy       
             2017                    None                

 

 weakness                    Quality                    generalized fatigue    
                2017                    None                

 

 weakness                    Quality                    muscle weakness        
            2017                    None                

 

 weakness                    Alleviating Factors                    rest       
             2017                    None                

 

 weakness                    Exacerbating Factors                    exertion  
                  2017                    None                

 

 weakness                    Exacerbating Factors                    activity  
                  2017                    None                

 

 weakness                    Pertinent Findings                    gait 
abnormality                    2017                    None              
  

 

 weakness                    Pertinent Findings                    respiratory 
difficulties                    2017                    (has emphysema)  
               

 

 weakness                    Pertinent Findings                    sensory 
changes                    2017                    (intermittent dizziness
)                 

 

 hypertension                    Onset and Resolution                    
ongoing                    2017                    None                

 

 hypertension                    Onset of Symptom                    during 
adulthood                    2017                    None                

 

 hypertension                    Alleviating Factors                    
medication                    2017                    None                

 

 hypertension                    Pertinent Findings                    
decreased energy                    2017                    --improved   
              

 

 hypertension                    Pertinent Findings                    
dizziness                    2017                    --intermittent      
           

 

 hypertension                    Pertinent Findings                    dyspnea 
                   2017                    --intermittent- has emphysema 
                

 

 hypertension                    Pertinent Findings                    Denies 
edema                    2017                    None                

 

 hypertension                    Pertinent Findings                    muscle 
weakness                    2017                    None                

 

 hypertension                    Blood Pressure Values                    not 
checking blood pressure at home                    2017                  
  None                

 

 weakness                    Quality                    improving              
      2017                    None                

 

 weakness                    Onset and Resolution                    ongoing   
                 2017                    None                

 

 dizziness                    Quality                    chronic               
     2017                    None                

 

 dizziness                    Quality                    imbalance             
       2017                    None                

 

 dizziness                    Quality                    intermittent          
          2017                    None                

 

 dizziness                    Quality                    sense of motion       
             2017                    None                

 

 dizziness                    Quality                    sense of room spinning
                    2017                    None                

 

 dizziness                    Quality                    spinning              
      2017                    None                

 

 dizziness                    Onset and Resolution                    ongoing  
                  2017                    None                

 

 dizziness                    Pertinent Findings                    
lightheadedness                    2017                    None          
      

 

 dizziness                    Pertinent Findings                    problems 
with coordination                    2017                    None        
        

 

 dizziness                    Significant Family History                    
hypertension                    2017                    None             
   

 

 dizziness                    Significant Medical Conditions                    
illness                    2017                    None                

 

 weakness                    Quality                    upper extremities      
              2017                    None                

 

 weakness                    Quality                    lower extremities      
              2017                    None                

 

 weakness                    Quality                    generalized fatigue    
                2017                    None                

 

 weakness                    Quality                    decreased energy       
             2017                    None                

 

 weakness                    Quality                    muscle weakness        
            2017                    None                

 

 weakness                    Onset and Resolution                    sudden in 
onset                    2017                    after hospital stays    
            

 

 weakness                    Alleviating Factors                    rest       
             2017                    None                

 

 weakness                    Exacerbating Factors                    exertion  
                  2017                    None                

 

 weakness                    Exacerbating Factors                    activity  
                  2017                    None                

 

 weakness                    Exacerbating Factors                    stress    
                2017                    None                

 

 weakness                    Pertinent Findings                    gait 
abnormality                    2017                    None              
  

 

 weakness                    Pertinent Findings                    respiratory 
difficulties                    2017                    None             
   

 

 weakness                    Pertinent Findings                    sensory 
changes                    2017                    dizziness             
   

 

 hypertension                    Onset and Resolution                    
gradual in onset                    2017                    None         
       

 

 hypertension                    Onset and Resolution                    
ongoing                    2017                    None                

 

 hypertension                    Onset of Symptom                    during 
adulthood                    2017                    None                

 

 hypertension                    Alleviating Factors                    
medication                    2017                    None                

 

 hypertension                    Pertinent Findings                    
dizziness                    2017                    None                

 

 hypertension                    Pertinent Findings                    dyspnea 
                   2017                    None                

 

 hypertension                    Pertinent Findings                    Denies 
edema                    2017                    None                

 

 hypertension                    Pertinent Findings                    
decreased energy                    2017                    None         
       

 

 hypertension                    Pertinent Findings                    lethargy
                    2017                    None                

 

 hypertension                    Pertinent Findings                    muscle 
weakness                    2017                    None                

 

 hyperlipidemia                    Onset of Symptom                    during 
adulthood                    2017                    None                

 

 hyperlipidemia                    Frequency of Episodes                    
unchanged                    2017                    None                

 

 hyperlipidemia                    Onset and Resolution                    
ongoing                    2017                    None                

 

 hyperlipidemia                    Pertinent Findings                    Denies 
edema                    2017                    None                

 

 hyperlipidemia                    Pertinent Findings                    fever 
                   2017                    None                



                                                                    



Advance Directives

          No Advance Directive data                                            
                        



Encounters

                      





 Encounter                    Performer                    Location            
        Codes                    Date                

 

                     [id number] EST. PATIENT, LEVEL IV

Diagnosis: Essential (primary) hypertension[ICD10: I10]

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

Diagnosis: Mixed hyperlipidemia[ICD10: E78.2]

Diagnosis: Dysuria[ICD10: R30.0]

Diagnosis: Encounter for immunization[ICD10: Z23]                              
        Nora Hernández MD, [facility]               
     CPT-4: 84929                    2017                

 

                     [id number] EST. PATIENT, LEVEL IV

Diagnosis: Essential (primary) hypertension[ICD10: I10]

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                         
             Nora Hernández MD, [facility]          
          CPT-4: 51713                    2017                

 

                     [id number] [id number] EST. PATIENT, LEVEL IV

Diagnosis: Essential (primary) hypertension[ICD10: I10]

Diagnosis: Muscle weakness (generalized)[ICD10: M62.81]

Diagnosis: Hypokalemia[ICD10: E87.6]

Diagnosis: Vitamin B12 deficiency anemia, unspecified[ICD10: D51.9]

Diagnosis: Hypomagnesemia[ICD10: E83.42]

Diagnosis: Hypothyroidism, unspecified[ICD10: E03.9]

Diagnosis: Chronic obstructive pulmonary disease, unspecified[ICD10: J44.9]    
                                  Flor Hernández MD, [facility]                    CPT-4: 77585                    2017 
               



                                                                               
                                                           



Plan of Care

                      





 Planned Activity                    Notes                    Codes            
        Status                    Date                

 

 Appointment: Nora Hernández 

WPtel:[phone]

 [address] [place], [place] [address]

[place]                                                             (15 min) 
Moderate                    2018                

 

 Appointment: Nora Hernández 

WPtel:[phone]

 [address] [place], [place] [address]

[place]                                                             (15 min) 
Moderate                    2017                

 

 Patient Education: Patient Medication Summary                                 
                            Completed                    2017            
    

 

 Appointment: Nora Hernández 

WPtel:[phone]

 [facility]

[place]                                                             (15 min) 
Moderate                    2017                

 

 Appointment: Nora Hernández 

WPtel:[phone]

 [facility]

[place]                                                             (15 min) 
Moderate                    2017                

 

 Patient Education: Patient Medication Summary                                 
                            Completed                    2017            
    

 

 Patient Education: Hypertension                                               
              Completed                    2017                

 

 Appointment: Flor Pavon 

WPtel:[phone]

 [address] [place], [place] [address]

                                                             New Patient     
               2017                

 

 Patient Education: Patient Medication Summary                                 
                            Completed                    2017            
    



                                                                               
                                       



Instructions

          No Instructions

## 2018-04-28 NOTE — ED CARDIAC GENERAL
History of Present Illness


General


Chief Complaint:  Chest Pain


Stated Complaint:  CHEST PAIN


Nursing Triage Note:  


TO ED PER W/C ACCOMPIED BY SON. PATIENT REPORTS THAT PAIN  WOKE HER UP THIS AM 


NO PAIN ON ADMIT .


Source:  patient (VERY LIMITED HISTORIAN--VERY POOR MEMORY), family (SON GIVE 

MOST OF PT'S INFORMATION)


Exam Limitations:  other (PT HAS VERY POOR MEMORY)





History of Present Illness


Date Seen by Provider:  Apr 28, 2018


Time Seen by Provider:  13:05


Initial Comments


PT ARRIVES VIA POV FROM HOME--PT LIVES ALONE


PT HAD CHEST PAIN THAT WOKE HER UP SOMETIME DURING THE NIGHT--HAS NO IDEA WHAT 

TIME THIS OCCURRED, AND DOES NOT KNOW WHERE HER PAIN WAS. 


NO PAIN NOW


PT IS UNABLE TO STATE HOW LONG THE PAIN LASTED


PT HAS NO IDEA IF SHE HAD ANY OTHER SYMPTOMS WITH IT SUCH AS SHORTNESS OF BREATH

, SWEATS, PALPITATIONS, ETC. 


PT HAS CHRONIC NAUSEA AND DIZZINESS, AND IS WORSE IF SHE IS LAYING FLAT OR WITH 

ANY MOVEMENTS


PT DOES STATE SHE DRINKS A GLASS OF RED WINE--"WATERED DOWN"--EVERY DAY





PT CAN'T REMEMBER ANY OF HER MEDICATIONS, WHAT SHE TAKES THEM FOR, OR IF SHE 

HAS TAKEN ANY OF HER MEDICATIONS TODAY. 





SON STATES HE CALLED TO CHECK ON HER THIS MORNING AND SHE TOLD HIM ABOUT HAVING 

SOME CHEST PAIN, BUT DID NOT WANT TO COME TO HOSPITAL. EVENTUALLY SHE AGREED. 


SON REPORTS THAT PT DID C/O LEFT LOWER RIB PAIN EARLIER IN THE WEEK. NO KNOWN 

INJURY, NO FEVER, COUGH OR URI SYMPTOMS, AND NO SHORTNESS OF BREATH.


NTG SL PTA:  No


ASA po PTA:  No





Allergies and Home Medications


Allergies


Coded Allergies:  


     prednisone (Verified  Allergy, Unknown, 9/18/15)


     codeine (Verified  Adverse Reaction, Mild, N/V, SWEATS, 9/18/15)


Uncoded Allergies:  


     NARCOTICS (Adverse Reaction, Intermediate, 2/1/13)





Home Medications


Acetaminophen 500 Mg Tablet, 500 MG PO Q4H PRN for FEVER


   Prescribed by: MOSES CHAIREZ on 2/10/17 0813


Albuterol Sulfate 8.5 Gm Hfa.aer.ad, 2 PUFF IH Q4H PRN for SHORTNESS OF BREATH, 

(Reported)


Atorvastatin Calcium 10 Mg Tablet, 10 MG PO DAILY, (Reported)


Clopidogrel Bisulfate 75 Mg Tablet, 75 MG PO DAILY, (Reported)


Diphenoxylate HCl/Atropine 1 Each Tablet, 1 EA PO QID PRN for DIARRHEA


   Prescribed by: MOSES CHAIREZ on 2/10/17 0813


Enalapril Maleate 10 Mg Tablet, 10 MG PO BID, (Reported)


L. Acidophilus/Bulgaricus 1 Each Tablet, 1 TAB.CHEW PO AC


   Prescribed by: MOSES CHAIREZ on 2/10/17 0813


Levothyroxine Sodium 100 Mcg Tablet, 100 MCG PO DAILY


   Prescribed by: MOSES CHAIREZ on 2/10/17 0813


Meclizine HCl 25 Mg Tablet, 25 MG PO DAILY PRN for VERTIGO, (Reported)


Metoprolol Tartrate 25 Mg Tablet, 25 MG PO BID, (Reported)


Montelukast Sodium 10 Mg Tablet, 10 MG PO HS, (Reported)


Pantoprazole Sodium 40 Mg Tablet.dr, 40 MG PO DAILY, (Reported)


Tolterodine Tartrate 1 Mg Tablet, 1 MG PO BID, (Reported)


Trazodone HCl 50 Mg Tablet, 50 MG PO HS PRN for SLEEP, (Reported)


Verapamil HCl 240 Mg Tablet.er, 240 MG PO DAILY, (Reported)


Vit A/C/E/Zinc/Co 1 Cap Capsule, 1 CAP PO BID, (Reported)





Patient Home Medication List


Home Medication List Reviewed:  Yes





Review of Systems


Constitutional:  see HPI


Cardiovascular:  See HPI, Chest Pain





Past Medical-Social-Family Hx


Patient Social History


Alcohol Use:  Regular Use (GLASS OF RED WINE DAILY--"WATERED DOWN" PER PT)


Number of Drinks Today:  HH


Alcohol Beverage of Choice:  Wine


Recreational Drug Use:  No


Smoking Status:  Never a Smoker


Former Smoker, Quit:  Jan 1, 2000


2nd Hand Smoke Exposure:  No


Recent Foreign Travel:  No


Contact w/Someone Who Travel:  No


Recent Infectious Disease Expo:  No


Recent Hopitalizations:  No





Immunizations Up To Date


Tetanus Booster (TDap):  Unknown


Date of Pneumonia Vaccine:  Oct 1, 2009


Date of Influenza Vaccine:  Oct 15, 2016





Seasonal Allergies


Seasonal Allergies:  No





Past Medical History


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 

CARDIAC CATH--STENT X 1 ; NEUROMA LEFT FOOT; CATARACT SURGERY X 3)


Coronary Stent, Eye Surgery, Gallbladder, Orthopedic, Tonsillectomy


Respiratory:  Yes (ON VENT POST OP AFTER HIF FX)


Pneumonia, COPD, Emphysema


Currently Using CPAP:  No


Currently Using BIPAP:  No


Cardiac:  Yes (CARDIAC STENT X1; NSTEMI)


Coronary Artery Disease, Heart Attack, High Cholesterol, Hypertension, Valvular 

Heart Disease


Neurological:  Yes (chronic dizziness)


Dementia, Vertigo


Reproductive Disorders:  No


Female Reproductive Disorders:  Denies


GYN History:  Menopausal


Sexually Transmitted Disease:  No


HIV/AIDS:  No


Genitourinary:  Yes


Kidney Infection, UTI-Chronic


Gastrointestinal:  Yes (CHRONIC NAUSEA)


Colitis, Gastroesophageal Reflux


Musculoskeletal:  Yes (LEFT HIP FX-2/1/2013,LUMBAR COMPRESSION FRACTURES; 

NEUROMA LEFT FOOT; RIGHT SHOULDER DISLOCATION)


Arthritis, Fractures


Endocrine:  Yes


Hypothyroidsim


HEENT:  Yes (CATARACT SURGERY X 3)


Cataract


Cancer:  No


Psychosocial:  No


Integumentary:  No


Blood Disorders:  No





Family Medical History





Neoplasm (LUNG CANCER)


  G8 SISTER


Cancer, Hypertension





Physical Exam


Vital Signs





Vital Signs - First Documented








 4/28/18





 13:04


 


Temp 98.0


 


Pulse 62


 


Resp 18


 


B/P (MAP) 193/98 (129)


 


Pulse Ox 93


 


O2 Delivery Nasal Cannula


 


O2 Flow Rate 2.00





Capillary Refill : Less Than 3 Seconds


General Appearance:  No Apparent Distress, Thin, Other (UMKEMPT, ILL-FITTING WIG

-IN DISARRAY.  )


HEENT:  PERRL/EOMI


Neck:  Full Range of Motion, Normal Inspection


Respiratory:  Chest Non Tender, Normal Breath Sounds, No Accessory Muscle Use, 

No Respiratory Distress


Cardiovascular:  Regular Rate, Rhythm, No Edema, No JVD, No Murmur


Gastrointestinal:  Non Tender, Soft


Extremity:  Normal Range of Motion, Non Tender, No Calf Tenderness, No Pedal 

Edema


Neurologic/Psychiatric:  Alert, No Motor/Sensory Deficits, Normal Mood/Affect, 

CNs II-XII Norm as Tested, Other (ORIENTED TO PERSON, PLACE, BUT DISORIENTED TO 

TIME. AND SOMEWHAT TO SITUATION AS SHE OFTEN CANNOT RECALL WHAT BROUGHT HER TO 

THE ER IN THE FIRST PLACE. PT CANNOT RECALL SOMETHING THAT SHE OR SOMEONE ELSE 

IN THE ROOM SAID LESS THAN 60 SECONDS AGO. VERY POOR SHORT TERM MEMORY)


Skin:  Normal Color, Warm/Dry





Progress/Results/Core Measures


Lab Results





Laboratory Tests








Test


 4/28/18


13:20 Range/Units


 


 


White Blood Count


 7.6 


 4.3-11.0


10^3/uL


 


Red Blood Count


 4.01 L


 4.35-5.85


10^6/uL


 


Hemoglobin 13.5  11.5-16.0  G/DL


 


Hematocrit 40  35-52  %


 


Mean Corpuscular Volume 100 H 80-99  FL


 


Mean Corpuscular Hemoglobin 34  25-34  PG


 


Mean Corpuscular Hemoglobin


Concent 34 


 32-36  G/DL





 


Red Cell Distribution Width 13.3  10.0-14.5  %


 


Platelet Count


 235 


 130-400


10^3/uL


 


Mean Platelet Volume 10.9 H 7.4-10.4  FL


 


Neutrophils (%) (Auto) 61  42-75  %


 


Lymphocytes (%) (Auto) 27  12-44  %


 


Monocytes (%) (Auto) 10  0-12  %


 


Eosinophils (%) (Auto) 2  0-10  %


 


Basophils (%) (Auto) 0  0-10  %


 


Neutrophils # (Auto) 4.6  1.8-7.8  X 10^3


 


Lymphocytes # (Auto) 2.0  1.0-4.0  X 10^3


 


Monocytes # (Auto) 0.7  0.0-1.0  X 10^3


 


Eosinophils # (Auto)


 0.1 


 0.0-0.3


10^3/uL


 


Basophils # (Auto)


 0.0 


 0.0-0.1


10^3/uL


 


Prothrombin Time 13.3  12.2-14.7  SEC


 


INR Comment 1.0  0.8-1.4  


 


Activated Partial


Thromboplast Time 29 


 24-35  SEC





 


Sodium Level 136  135-145  MMOL/L


 


Potassium Level 4.2  3.6-5.0  MMOL/L


 


Chloride Level 101    MMOL/L


 


Carbon Dioxide Level 24  21-32  MMOL/L


 


Anion Gap 11  5-14  MMOL/L


 


Blood Urea Nitrogen 15  7-18  MG/DL


 


Creatinine


 0.75 


 0.60-1.30


MG/DL


 


Estimat Glomerular Filtration


Rate > 60 


  





 


BUN/Creatinine Ratio 20   


 


Glucose Level 105    MG/DL


 


Calcium Level 9.5  8.5-10.1  MG/DL


 


Magnesium Level 2.0  1.8-2.4  MG/DL


 


Total Bilirubin 0.7  0.1-1.0  MG/DL


 


Aspartate Amino Transf


(AST/SGOT) 24 


 5-34  U/L





 


Alanine Aminotransferase


(ALT/SGPT) 15 


 0-55  U/L





 


Alkaline Phosphatase 81    U/L


 


Total Creatine Kinase 42    U/L


 


Creatine Kinase MB 1.0  <6.6  NG/ML


 


Troponin I < 0.30  <0.30  NG/ML


 


B-Type Natriuretic Peptide 152.8 H <100.0  PG/ML


 


Total Protein 7.4  6.4-8.2  GM/DL


 


Albumin 4.2  3.2-4.5  GM/DL


 


Amylase Level 67    U/L


 


Lipase 30  8-78  U/L


 


Serum Alcohol < 10  <10  MG/DL








My Orders





Orders - JAMISON GATICA 


Amylase (4/28/18 13:14)


Cbc With Automated Diff (4/28/18 13:14)


Comprehensive Metabolic Panel (4/28/18 13:14)


Creatine Kinase (4/28/18 13:14)


Creatine Kinase Mb (4/28/18 13:14)


Lipase (4/28/18 13:14)


Partial Thromboplastin Time (4/28/18 13:14)


Protime With Inr (4/28/18 13:14)


Troponin I (4/28/18 13:14)


Chest 1 View, Ap/Pa Only (4/28/18 13:14)


O2 (4/28/18 13:14)


Ekg Tracing (4/28/18 13:14)


Aspirin Chewable Tablet (Baby Aspirin Ch (4/28/18 13:15)


BNP (4/28/18 13:14)


Monitor-Rhythm Ecg Trace Only (4/28/18 13:14)


Magnesium (4/28/18 13:14)


Ondansetron Injection (Zofran Injectio (4/28/18 14:30)


Pantoprazole Injection (Protonix Injecti (4/28/18 14:30)


Enalaprilat Injection (Vasotec Injection (4/28/18 14:30)


Metoprolol Succinate (Xl) Tab (Toprol Xl (4/28/18 14:30)


Scopolamine Patch (Transderm-Scop Patch) (4/28/18 15:00)


Metoprolol Tartrate Injection (Lopressor (4/28/18 15:15)





Medications Given in ED





Current Medications








 Medications  Dose


 Ordered  Sig/Greg


 Route  Start Time


 Stop Time Status Last Admin


Dose Admin


 


 Aspirin  324 mg  ONCE  ONCE


 PO  4/28/18 13:15


 4/28/18 13:16 DC 4/28/18 13:26


324 MG


 


 Enalaprilat  2.5 mg  ONCE  ONCE


 IV  4/28/18 14:30


 4/28/18 14:31 DC 4/28/18 15:00


2.5 MG


 


 Metoprolol


 Tartrate  5 mg  ONCE  ONCE


 IV  4/28/18 15:15


 4/28/18 15:16 DC 4/28/18 15:21


5 MG


 


 Ondansetron HCl  4 mg  ONCE  ONCE


 IVP  4/28/18 14:30


 4/28/18 14:31 DC 4/28/18 14:54


4 MG


 


 Pantoprazole  40 mg  ONCE  ONCE


 IV  4/28/18 14:30


 4/28/18 14:31 DC 4/28/18 14:56


40 MG


 


 Scopolamine  1.5 mg  ONCE  ONCE


 TD  4/28/18 15:00


 4/28/18 15:01 DC 4/28/18 15:10


1.5 MG








Vital Signs/I&O











 4/28/18 4/28/18 4/28/18





 13:04 13:04 14:13


 


Temp  98.0 


 


Pulse  62 62


 


Resp  18 18


 


B/P (MAP)  193/98 (129) 172/102 (125)


 


Pulse Ox  93 98


 


O2 Delivery Nasal Cannula Room Air Room Air


 


O2 Flow Rate 2.00  














Blood Pressure Mean:  125








Progress Note :  


Progress Note


NO C/O CHEST PAIN OR SHORTNESS OF BREATH DURING ER STAY


ONLY C/O NAUSEA AND  DIZZINESS--BOTH OF WHICH ARE CHRONIC PROBLEMS AND ARE 

ALWAYS WORSE WITH ANY EXERTION, MOVEMENTS, CHANGE IN ENVIRONMENT, CAR RIDES, 

ACCORDING TO SON. STATES NAUSEA IS MUCH WORSE ANYTIME SHE IS OUT OF THE HOUSE, 

AND TAKES DAYS TO RECOVER 





BOTH PT AND SON INSIST ON PT GOING HOME. SON STATES PT DOES MUCH BETTER AT HOME





1510--PT HAS BEEN TO BATHROOM AND BACK AND NOW IS SEVERELY NAUSEATED AND DIZZY, 

AND IS LAYING PRONE ON THE BED, AND NOT WANTING TO MOVE DUE TO EXACERBATION OF 

THESE SYMPTOMS. 


PT AND SON NOW AGREE TO ADMIT.


Initial ECG Impression Time:  13:11


Initial ECG Rate:  63


Initial ECG Rhythm:  Normal Sinus


Initial ECG Impression:  Normal





Comments


CXR--NO ACUTE PROCESS, PER RADIOLOGIST REPORT


   Reviewed:  Reviewed by Me





Departure


Communication (Admissions)


1514--SPOKE WITH DR. KOWALSKI, ACCEPTS PT FOR ADMIT. ORDERS NOTED--ADVISES TO DC 

SCOPOLAMINE. .





Impression





 Primary Impression:  


 Chest pain


 Additional Impressions:  


 Nausea


 Hiatal hernia


 SUSPECTED GERD


 HTN (hypertension)


 INTRACTABLE NAUSEA AND DIZZINESS


Disposition:  09 ADMITTED AS INPATIENT


Condition:  Stable





Admissions


Decision to Admit Reason:  Admit from ER (General)


Decision to Admit/Date:  Apr 28, 2018


Time/Decision to Admit Time:  15:15





Departure-Patient Inst.


Referrals:  


MOSES CHAIREZ MD (PCP/Family)


Primary Care Physician





Add. Discharge Instructions:  








All discharge instructions reviewed with patient and/or family. Voiced 

understanding.











JAMISON GATICA DO Apr 28, 2018 14:32

## 2018-04-29 VITALS — SYSTOLIC BLOOD PRESSURE: 118 MMHG | DIASTOLIC BLOOD PRESSURE: 56 MMHG

## 2018-04-29 VITALS — DIASTOLIC BLOOD PRESSURE: 78 MMHG | SYSTOLIC BLOOD PRESSURE: 152 MMHG

## 2018-04-29 VITALS — SYSTOLIC BLOOD PRESSURE: 146 MMHG | DIASTOLIC BLOOD PRESSURE: 65 MMHG

## 2018-04-29 LAB
ALBUMIN SERPL-MCNC: 3.6 GM/DL (ref 3.2–4.5)
ALP SERPL-CCNC: 71 U/L (ref 40–136)
ALT SERPL-CCNC: 14 U/L (ref 0–55)
BASOPHILS # BLD AUTO: 0 10^3/UL (ref 0–0.1)
BASOPHILS NFR BLD AUTO: 0 % (ref 0–10)
BILIRUB SERPL-MCNC: 0.5 MG/DL (ref 0.1–1)
BUN/CREAT SERPL: 19
CALCIUM SERPL-MCNC: 9 MG/DL (ref 8.5–10.1)
CHLORIDE SERPL-SCNC: 98 MMOL/L (ref 98–107)
CHOLEST SERPL-MCNC: 149 MG/DL (ref ?–200)
CO2 SERPL-SCNC: 22 MMOL/L (ref 21–32)
CREAT SERPL-MCNC: 0.78 MG/DL (ref 0.6–1.3)
EOSINOPHIL # BLD AUTO: 0.1 10^3/UL (ref 0–0.3)
EOSINOPHIL NFR BLD AUTO: 2 % (ref 0–10)
ERYTHROCYTE [DISTWIDTH] IN BLOOD BY AUTOMATED COUNT: 13.1 % (ref 10–14.5)
GFR SERPLBLD BASED ON 1.73 SQ M-ARVRAT: > 60 ML/MIN
GLUCOSE SERPL-MCNC: 85 MG/DL (ref 70–105)
HCT VFR BLD CALC: 38 % (ref 35–52)
HDLC SERPL-MCNC: 61 MG/DL (ref 40–60)
HGB BLD-MCNC: 12.9 G/DL (ref 11.5–16)
LYMPHOCYTES # BLD AUTO: 2.6 X 10^3 (ref 1–4)
LYMPHOCYTES NFR BLD AUTO: 35 % (ref 12–44)
MANUAL DIFFERENTIAL PERFORMED BLD QL: NO
MCH RBC QN AUTO: 34 PG (ref 25–34)
MCHC RBC AUTO-ENTMCNC: 34 G/DL (ref 32–36)
MCV RBC AUTO: 100 FL (ref 80–99)
MONOCYTES # BLD AUTO: 0.7 X 10^3 (ref 0–1)
MONOCYTES NFR BLD AUTO: 10 % (ref 0–12)
NEUTROPHILS # BLD AUTO: 4 X 10^3 (ref 1.8–7.8)
NEUTROPHILS NFR BLD AUTO: 54 % (ref 42–75)
PLATELET # BLD: 218 10^3/UL (ref 130–400)
PMV BLD AUTO: 11.3 FL (ref 7.4–10.4)
POTASSIUM SERPL-SCNC: 3.9 MMOL/L (ref 3.6–5)
PROT SERPL-MCNC: 6.4 GM/DL (ref 6.4–8.2)
RBC # BLD AUTO: 3.81 10^6/UL (ref 4.35–5.85)
SODIUM SERPL-SCNC: 132 MMOL/L (ref 135–145)
TRIGL SERPL-MCNC: 198 MG/DL (ref ?–150)
VLDLC SERPL CALC-MCNC: 40 MG/DL (ref 5–40)
WBC # BLD AUTO: 7.4 10^3/UL (ref 4.3–11)

## 2018-04-29 RX ADMIN — MECLIZINE HYDROCHLORIDE SCH MG: 25 TABLET ORAL at 00:15

## 2018-04-29 RX ADMIN — SUCRALFATE SCH GM: 1 TABLET ORAL at 08:08

## 2018-04-29 RX ADMIN — MECLIZINE HYDROCHLORIDE SCH MG: 25 TABLET ORAL at 06:28

## 2018-04-29 NOTE — SHORT STAY SUMMARY-HOSPITALIST
History of Present Illness


HPI/Chief Complaint


Mrs. Cuenca a delightful 87-year-old white female whom however is a very 

poor historian. she reports the onset of palpitations at least 2 days ago that 

occurred only once for an known length of time.  She has noticed left-sided 

chest pain for at least the past several days denying any associated trauma.  

She apparently has a long-standing history of dizziness for which she has been 

told it's an inner ear issue and indeed it is positional and associated with 

nausea.  She's not clear as to whenher typical vertiginous sounding symptoms 

with head position change began but she has prominent nausea and was 

complaining about dizziness on her arrival to the emergency room.  She does 

have a past history of coronary artery disease reportsa solitary stent placed 

at least several years ago which is corroborated by her son.  She wished to be 

discharged in the emergency room but continued to have nausea with some emesis 

without evidence for blood according to the emergency room physician.  She was 

given IV Zofran and some meclizine and admitted for chest pain rule out and 

further evaluation. She denies a pleuritic component to her chest pain cough 

diaphoresisor abdominal pain  Dysphasia melena or bright red blood per rectum.  

She has had however nausea that she associates more with dizziness.  Her chest 

painis aggravated by palpation of the left chest wall.


Date Seen


4/29/18


Time Seen by Provider:  08:45


Attending Physician


Elmer Spivey M.D.


PCP


Moses Hernández MD


Referring Physician





Date of Admission


Apr 28, 2018 at 15:15





Home Medications & Allergies


Home Medications


Reviewed patient Home Medication Reconciliation performed by pharmacy 

medication reconciliations technician and/or nursing.


Patients Allergies have been reviewed.





Allergies





Allergies


Coded Allergies


  prednisone (Verified Allergy, Unknown, 9/18/15)


  codeine (Verified Adverse Reaction, Mild, N/V, SWEATS, 9/18/15)


Uncoded Allergies


  NARCOTICS ( Adverse Reaction, Intermediate, 2/1/13)








Past Medical-Social-Family Hx


Past Med/Social Hx:  Reviewed and Corrections made


Patient Social History


Alcohol Use:  Regular Use


Number of Drinks Today:  


Alcohol Beverage of Choice:  Wine


Recreational Drug Use:  No


Smoking Status:  Never a Smoker


Former Smoker, Quit:  Jan 1, 2000


2nd Hand Smoke Exposure:  No


Physical Abuse Screen:  No


Sexual Abuse:  No


Recent Foreign Travel:  No


Contact w/other who traveled:  No


Recent Hopitalizations:  No


Recent Infectious Disease Expo:  No





Immunizations Up To Date


Tetanus Booster (TDap):  Unknown


Date of Pneumonia Vaccine:  Oct 1, 2009


Date of Influenza Vaccine:  Oct 15, 2016





Seasonal Allergies


Seasonal Allergies:  No





Past Medical History


Surgeries:  Coronary Stent, Eye Surgery, Gallbladder, Orthopedic, Tonsillectomy


Currently Using CPAP:  No


Currently Using BIPAP:  No


Cardiac:  Coronary Artery Disease, Heart Attack, High Cholesterol, Hypertension

, Valvular Heart Disease


Neurological:  Dementia, Vertigo


Reproductive:  No


Sexually Transmitted Disease:  No


HIV/AIDS:  No


Female Reproductive Disorders:  Denies


Menopausal


Genitourinary:  Kidney Infection, UTI-Chronic


Gastrointestinal:  Colitis, Gastroesophageal Reflux


Musculoskeletal:  Arthritis, Fractures


Endocrine:  Hypothyroidsim


HEENT:  Cataract


History of Blood Disorders:  No





Family History





Neoplasm (LUNG CANCER)


  G8 SISTER


Cancer, Hypertension





Review of Systems


Constitutional:  see HPI; No chills, No diaphoresis; dizziness; No fever, No 

malaise, No weakness, No weight gain, No weight loss, No other


Cardiovascular:  see HPI, chest pain; No edema; Hx of Intervention, palpitations

; No syncope, No vascular heart diseas, No other


Gastrointestinal:  see HPI


Genitourinary:  no symptoms reported





Physical Exam


Physical Exam


Vital Signs





Vital Signs - First Documented








 4/28/18





 13:04


 


Temp 98.0


 


Pulse 62


 


Resp 18


 


B/P (MAP) 193/98 (129)


 


Pulse Ox 93


 


O2 Delivery Nasal Cannula


 


O2 Flow Rate 2.00





Capillary Refill : Less Than 3 Seconds


General Appearance:  No Apparent Distress


Neck:  Non Tender, Supple


Respiratory:  Lungs Clear, Normal Breath Sounds, No Accessory Muscle Use, No 

Respiratory Distress, Other (mild left chest wall pain to palpation no palpable 

abnormalities noted no evidence for bruising erythema or skin induration.  No 

warmth noted.)


Cardiovascular:  Regular Rate, Rhythm, No Edema, No Gallop, No JVD, No Murmur, 

Normal Peripheral Pulses


Gastrointestinal:  Normal Bowel Sounds, No Organomegaly, No Pulsatile Mass, Non 

Tender, Soft


Extremity:  Normal Inspection, Normal Range of Motion, Non Tender, No Calf 

Tenderness, No Pedal Edema


Skin:  Warm/Dry





Results


Results/Procedures


Labs


Laboratory Tests


4/28/18 13:20








4/29/18 04:48








Patient resulted labs reviewed.





Short Stay Diagnosis


Discharge Diagnosis-Short Stay


Admission Diagnosis


1.  Left chest wall pain cute coronary syndrome ruled out.


2.  History of coronary artery disease.


3.  Likely benign positional vertigo.


4.  Mild cognitive impairment versus mild dementia defer further workup to Dr. Bowman as an outpatient.


Final Discharge Diagnosis


as per above





Conclusion


Plan


Mrs. Reaves was admitted to the fourth floor on telemetry.  Serial EKGs 

revealed no evidence for ischemia or essentially unremarkable.  Serial troponin 

levels remained normal and the patient experienced no palpitations during her 

hospital stay.  No arrhythmias were noted on telemetry  Her nausea resolved and 

she had no further dizziness.  We diddiscuss at the onset of dizziness since 

nausea becomes a prominent feature for her that she take Zofran 4 mg by mouth 

with a 25 mg meclizine tablet with subsequent rest.  Chest x-ray did not reveal 

any evidence for abnormality other than a possible hiatal hernia  If she 

continues to have left chest wall pain that is getting worse we discussed that 

further imaging studies may become necessary and she's to see Dr. Hernández for 

an early follow-up.  Also discussed with her son present that of palpitations 

are recurring further investigation either Holter monitor or event recording 

would be warranted.  Currently she is living in her own residence but her 

family predominantly son check on her daily.  She is to continue her home 

medications with the addition of Zofran 10 tabs of the 4 mg strength were 

called out to her pharmacy every 6 when necessary dizziness or nausea.





Clinical Quality Measures


AMI/AHF:


ASA po Prior to arrival:  No





Copy


Copies To 1:   MOSES HERNÁNDEZ MD, MARK D MD Apr 29, 2018 11:24

## 2018-10-24 ENCOUNTER — HOSPITAL ENCOUNTER (OUTPATIENT)
Dept: HOSPITAL 75 - RAD | Age: 83
End: 2018-10-24
Attending: NURSE PRACTITIONER
Payer: MEDICARE
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
## 2018-10-24 DIAGNOSIS — M51.36: ICD-10-CM

## 2018-10-24 DIAGNOSIS — M85.88: ICD-10-CM

## 2018-10-24 DIAGNOSIS — S32.010A: Primary | ICD-10-CM

## 2018-10-24 DIAGNOSIS — M47.818: ICD-10-CM

## 2018-10-24 PROCEDURE — 72220 X-RAY EXAM SACRUM TAILBONE: CPT

## 2018-10-24 PROCEDURE — 72100 X-RAY EXAM L-S SPINE 2/3 VWS: CPT

## 2018-10-24 NOTE — DIAGNOSTIC IMAGING REPORT
INDICATION: There is a compression fracture of the L1 vertebral

body. 



EXAMINATION: Multiple views of the lumbar spine were obtained.



FINDINGS: Multilevel degenerative disc disease throughout the

lumbar spine as well as some lower lumbar hypertrophic

degenerative facet disease. Soft tissue structures are otherwise

unremarkable. 



IMPRESSION:

1. Age-indeterminate compression fracture of L1. If there is high

clinical concern this may be acute further evaluation with MRI

should be considered.

2. Osteopenia and diffuse multilevel degenerative disc disease in

the lower lumbar hypertrophic degenerative facet disease, as

described. 



Dictated by: 



  Dictated on workstation # [id number]

## 2018-10-24 NOTE — DIAGNOSTIC IMAGING REPORT
PATIENT HISTORY: Fall, low back pain, tailbone pain. 



TECHNIQUE: Three views of the sacrum and coccyx.



COMPARISON: 02/01/2013.



FINDINGS:

Evaluation is somewhat suboptimal due to osteopenia and overlying

bowel gas. There are degenerative changes in the bilateral

sacroiliac joints. No definite fractures are seen. There is grade

1 anterolisthesis at L4-5 and L5-S1 with degenerative changes in

the lower lumbar spine. There are marked degenerative changes in

the right hip joint. There is internal fixation of the left

femoral neck.



IMPRESSION: 

1. No acute fracture is seen in the sacrum. There is diffuse

osteopenia and scattered degenerative changes as described above.



Dictated by: 



  Dictated on workstation # [id number]

## 2018-10-27 ENCOUNTER — HOSPITAL ENCOUNTER (OUTPATIENT)
Dept: HOSPITAL 75 - RAD | Age: 83
End: 2018-10-27
Attending: NURSE PRACTITIONER
Payer: MEDICARE

## 2018-10-27 DIAGNOSIS — M51.27: ICD-10-CM

## 2018-10-27 DIAGNOSIS — M24.28: ICD-10-CM

## 2018-10-27 DIAGNOSIS — S32.010D: Primary | ICD-10-CM

## 2018-10-27 DIAGNOSIS — M46.86: ICD-10-CM

## 2018-10-27 DIAGNOSIS — M47.816: ICD-10-CM

## 2018-10-27 DIAGNOSIS — M71.38: ICD-10-CM

## 2018-10-27 DIAGNOSIS — M43.16: ICD-10-CM

## 2018-10-27 DIAGNOSIS — M99.73: ICD-10-CM
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
## 2018-10-27 DIAGNOSIS — M48.061: ICD-10-CM

## 2018-10-27 DIAGNOSIS — M25.78: ICD-10-CM

## 2018-10-27 PROCEDURE — 72148 MRI LUMBAR SPINE W/O DYE: CPT

## 2018-10-27 NOTE — DIAGNOSTIC IMAGING REPORT
PROCEDURE: MRI lumbar spine.



TECHNIQUE: Multiplanar, multisequence MRI of the lumbar spine was

performed without contrast.



INDICATION: Fall, back pain, known L1 fracture lumbar spine films

compared with the study of 2009 as well as 10/24/2018.



Unchanged from the more recent study to a compression fracture of

the mid anterior third of the L1 vertebral body is present with

no progressive interval stature loss but has increased from 2009.

There is mild marrow edema crossing the superior and inferior

endplate believed to be on a degenerative basis as opposed to

reflecting edema from a recent compression progression. Remaining

lumbar statures are normal with slight grade 1 anterolisthesis at

L4 and L5, stable with retrolisthesis L2 on L3 and L1 on L2 grade

1 stable. The conus is normal. There is no paravertebral mass,

hemorrhage or fluid collection.



T12-L1: Bulging disc material and endplate osteophytes flatten

the ventral thecal sac but do not result in significant stenosis.



L1-L2: Bulging disc material and endplate osteophytes flatten the

ventral thecal sac with only mild canal and mild biforaminal

stenosis.



L2-L3: Osteophyte disc material minimally flattens the ventral

thecal sac but no substantial stenosis.



L3-L4: Buckled thickening of the ligamenta flava and a left-sided

small synovial facet cyst contributing to mild spinal canal

stenosis with mild biforaminal narrowing.



L4-L5: There is thickening of the ligamenta flava and facet

arthrosis with bulging disc material and endplate osteophytes.

Constellation of findings result in a mild degree of canal with

moderate left and mild right neural foraminal stenosis.



L5-S1: Bulging disc material eccentric to the left, mildly

stenosed, in the left neural foramen. The spinal canal is patent.





IMPRESSION: Unchanged L1 fracture when compared to recent

radiographs. This is progressed from 2009, however, does not

appear to have acutely progressed. The marrow edema at this level

is believed to be chronic and degenerative. There is spondylosis

and facet arthrosis with multilevel predominantly mild and

moderate stenoses of canal and neural foramina, listed level by

level above. Stable chronic slight grade 1 degenerative

listheses.



Dictated by: 



  Dictated on workstation # [id number]

## 2018-10-28 ENCOUNTER — HOSPITAL ENCOUNTER (INPATIENT)
Dept: HOSPITAL 75 - ER | Age: 83
LOS: 3 days | Discharge: NURSING, REHAB OR LONG-TERM CARE | DRG: 543 | End: 2018-10-31
Attending: INTERNAL MEDICINE | Admitting: INTERNAL MEDICINE
Payer: MEDICARE

## 2018-10-28 VITALS — SYSTOLIC BLOOD PRESSURE: 171 MMHG | DIASTOLIC BLOOD PRESSURE: 79 MMHG

## 2018-10-28 VITALS — DIASTOLIC BLOOD PRESSURE: 61 MMHG | SYSTOLIC BLOOD PRESSURE: 132 MMHG

## 2018-10-28 VITALS — DIASTOLIC BLOOD PRESSURE: 79 MMHG | SYSTOLIC BLOOD PRESSURE: 171 MMHG

## 2018-10-28 VITALS — WEIGHT: 106 LBS | HEIGHT: 63 IN | BODY MASS INDEX: 18.78 KG/M2

## 2018-10-28 DIAGNOSIS — R11.0: ICD-10-CM

## 2018-10-28 DIAGNOSIS — I10: ICD-10-CM

## 2018-10-28 DIAGNOSIS — F03.90: ICD-10-CM

## 2018-10-28 DIAGNOSIS — Z91.81: ICD-10-CM

## 2018-10-28 DIAGNOSIS — R42: ICD-10-CM

## 2018-10-28 DIAGNOSIS — Z95.5: ICD-10-CM

## 2018-10-28 DIAGNOSIS — M54.5: ICD-10-CM

## 2018-10-28 DIAGNOSIS — Z87.891: ICD-10-CM

## 2018-10-28 DIAGNOSIS — E78.00: ICD-10-CM

## 2018-10-28 DIAGNOSIS — I25.10: ICD-10-CM

## 2018-10-28 DIAGNOSIS — M43.17: ICD-10-CM

## 2018-10-28 DIAGNOSIS — I38: ICD-10-CM

## 2018-10-28 DIAGNOSIS — K21.9: ICD-10-CM

## 2018-10-28 DIAGNOSIS — M80.08XA: Primary | ICD-10-CM

## 2018-10-28 DIAGNOSIS — N39.0: ICD-10-CM

## 2018-10-28 DIAGNOSIS — M19.91: ICD-10-CM

## 2018-10-28 DIAGNOSIS — I25.2: ICD-10-CM

## 2018-10-28 DIAGNOSIS — J43.9: ICD-10-CM

## 2018-10-28 DIAGNOSIS — E03.9: ICD-10-CM

## 2018-10-28 LAB
ALBUMIN SERPL-MCNC: 3.5 GM/DL (ref 3.2–4.5)
ALP SERPL-CCNC: 116 U/L (ref 40–136)
ALT SERPL-CCNC: 15 U/L (ref 0–55)
APTT PPP: YELLOW S
BACTERIA #/AREA URNS HPF: (no result) /HPF
BASOPHILS # BLD AUTO: 0 10^3/UL (ref 0–0.1)
BASOPHILS NFR BLD AUTO: 0 % (ref 0–10)
BILIRUB SERPL-MCNC: 0.8 MG/DL (ref 0.1–1)
BILIRUB UR QL STRIP: NEGATIVE
BUN/CREAT SERPL: 18
CALCIUM SERPL-MCNC: 8.6 MG/DL (ref 8.5–10.1)
CHLORIDE SERPL-SCNC: 100 MMOL/L (ref 98–107)
CO2 SERPL-SCNC: 28 MMOL/L (ref 21–32)
CREAT SERPL-MCNC: 0.68 MG/DL (ref 0.6–1.3)
EOSINOPHIL # BLD AUTO: 0.1 10^3/UL (ref 0–0.3)
EOSINOPHIL NFR BLD AUTO: 2 % (ref 0–10)
ERYTHROCYTE [DISTWIDTH] IN BLOOD BY AUTOMATED COUNT: 17 % (ref 10–14.5)
FIBRINOGEN PPP-MCNC: (no result) MG/DL
GFR SERPLBLD BASED ON 1.73 SQ M-ARVRAT: > 60 ML/MIN
GLUCOSE SERPL-MCNC: 95 MG/DL (ref 70–105)
GLUCOSE UR STRIP-MCNC: NEGATIVE MG/DL
HCT VFR BLD CALC: 35 % (ref 35–52)
HGB BLD-MCNC: 11.3 G/DL (ref 11.5–16)
KETONES UR QL STRIP: (no result)
LEUKOCYTE ESTERASE UR QL STRIP: (no result)
LYMPHOCYTES # BLD AUTO: 1.6 X 10^3 (ref 1–4)
LYMPHOCYTES NFR BLD AUTO: 27 % (ref 12–44)
MANUAL DIFFERENTIAL PERFORMED BLD QL: NO
MCH RBC QN AUTO: 34 PG (ref 25–34)
MCHC RBC AUTO-ENTMCNC: 33 G/DL (ref 32–36)
MCV RBC AUTO: 106 FL (ref 80–99)
MONOCYTES # BLD AUTO: 0.5 X 10^3 (ref 0–1)
MONOCYTES NFR BLD AUTO: 8 % (ref 0–12)
NEUTROPHILS # BLD AUTO: 3.7 X 10^3 (ref 1.8–7.8)
NEUTROPHILS NFR BLD AUTO: 62 % (ref 42–75)
NITRITE UR QL STRIP: NEGATIVE
PH UR STRIP: 8 [PH] (ref 5–9)
PLATELET # BLD: 289 10^3/UL (ref 130–400)
PMV BLD AUTO: 10.6 FL (ref 7.4–10.4)
POTASSIUM SERPL-SCNC: 3 MMOL/L (ref 3.6–5)
PROT SERPL-MCNC: 6.4 GM/DL (ref 6.4–8.2)
PROT UR QL STRIP: (no result)
RBC # BLD AUTO: 3.29 10^6/UL (ref 4.35–5.85)
RBC #/AREA URNS HPF: >100 /HPF
SODIUM SERPL-SCNC: 141 MMOL/L (ref 135–145)
SP GR UR STRIP: 1.01 (ref 1.02–1.02)
SQUAMOUS #/AREA URNS HPF: (no result) /HPF
UROBILINOGEN UR-MCNC: NORMAL MG/DL
WBC # BLD AUTO: 6 10^3/UL (ref 4.3–11)
WBC #/AREA URNS HPF: (no result) /HPF

## 2018-10-28 PROCEDURE — 96375 TX/PRO/DX INJ NEW DRUG ADDON: CPT

## 2018-10-28 PROCEDURE — 80048 BASIC METABOLIC PNL TOTAL CA: CPT

## 2018-10-28 PROCEDURE — 72131 CT LUMBAR SPINE W/O DYE: CPT

## 2018-10-28 PROCEDURE — 94640 AIRWAY INHALATION TREATMENT: CPT

## 2018-10-28 PROCEDURE — 81000 URINALYSIS NONAUTO W/SCOPE: CPT

## 2018-10-28 PROCEDURE — 96366 THER/PROPH/DIAG IV INF ADDON: CPT

## 2018-10-28 PROCEDURE — 71045 X-RAY EXAM CHEST 1 VIEW: CPT

## 2018-10-28 PROCEDURE — 72195 MRI PELVIS W/O DYE: CPT

## 2018-10-28 PROCEDURE — 87088 URINE BACTERIA CULTURE: CPT

## 2018-10-28 PROCEDURE — 96361 HYDRATE IV INFUSION ADD-ON: CPT

## 2018-10-28 PROCEDURE — 80053 COMPREHEN METABOLIC PANEL: CPT

## 2018-10-28 PROCEDURE — 36415 COLL VENOUS BLD VENIPUNCTURE: CPT

## 2018-10-28 PROCEDURE — 85025 COMPLETE CBC W/AUTO DIFF WBC: CPT

## 2018-10-28 PROCEDURE — 96365 THER/PROPH/DIAG IV INF INIT: CPT

## 2018-10-28 RX ADMIN — SODIUM CHLORIDE SCH MLS/HR: 900 INJECTION, SOLUTION INTRAVENOUS at 12:30

## 2018-10-28 RX ADMIN — SODIUM CHLORIDE SCH MLS/HR: 900 INJECTION, SOLUTION INTRAVENOUS at 12:36

## 2018-10-28 NOTE — ED BACK PAIN
General


Chief Complaint:  General Problems/Pain


Stated Complaint:  BACK PAIN


Nursing Triage Note:  


pt had a fall several weeks ago, seen her PCP office and diagnosed with a 


fracture in the vertebra. pt and family reports increasing pain and decreased 


independence with ADL's.


Nursing Sepsis Screen:  No Definite Risk


Source of Information:  Patient, EMS, Family





History of Present Illness


Date Seen by Provider:  Oct 28, 2018


Time Seen by Provider:  12:39


Initial Comments


This 87-year-old white female presents with progressive low back pain after a 

fall 2 weeks ago.  The patient has had an MRI of her low back 2 days ago. The 

family has been informed that she has a fracture that may be amenable to 

kyphoplasty.





Patient lives independently but has been progressively less able to stand and 

move about.





The patient has been given tramadol which she is taking limited fashion for her 

back pain without success.





The patient at this time is complaining of nausea but denies associated 

weakness or loss of sensation in her legs.  She denies other injury from her 

fall from 2 weeks ago.  Her pain is in the lowest lumbar and sacral region.





My history the patient has an intolerance to narcotics in general.





Allergies and Home Medications


Allergies


Coded Allergies:  


     prednisone (Verified  Allergy, Unknown, 9/18/15)


     codeine (Verified  Adverse Reaction, Mild, N/V, SWEATS, 9/18/15)


Uncoded Allergies:  


     NARCOTICS (Adverse Reaction, Intermediate, 2/1/13)





Home Medications


Acetaminophen 500 Mg Tablet, 500 MG PO Q4H PRN for FEVER


   Prescribed by: MOSES CHAIREZ on 2/10/17 0813


Albuterol Sulfate 8.5 Gm Hfa.aer.ad, 2 PUFF IH Q4H PRN for SHORTNESS OF BREATH, 

(Reported)


Atorvastatin Calcium 10 Mg Tablet, 10 MG PO DAILY, (Reported)


Clopidogrel Bisulfate 75 Mg Tablet, 75 MG PO DAILY, (Reported)


Diphenoxylate HCl/Atropine 1 Each Tablet, 1 EA PO QID PRN for DIARRHEA


   Prescribed by: MOSES CHAIREZ on 2/10/17 0813


Enalapril Maleate 10 Mg Tablet, 10 MG PO BID, (Reported)


L. Acidophilus/Bulgaricus 1 Each Tablet, 1 TAB.CHEW PO AC


   Prescribed by: MOSES CHAIREZ on 2/10/17 0813


Levothyroxine Sodium 100 Mcg Tablet, 100 MCG PO DAILY


   Prescribed by: MOSES CHAIREZ on 2/10/17 0813


Meclizine HCl 25 Mg Tablet, 25 MG PO DAILY PRN for VERTIGO, (Reported)


Metoprolol Tartrate 25 Mg Tablet, 25 MG PO BID, (Reported)


Montelukast Sodium 10 Mg Tablet, 10 MG PO HS, (Reported)


Ondansetron HCl 4 Mg Tab, 4 MG PO Q6H


   Prescribed by: JOSE KOWALSKI on 4/29/18 1018


Pantoprazole Sodium 40 Mg Tablet.dr, 40 MG PO DAILY, (Reported)


Tolterodine Tartrate 1 Mg Tablet, 1 MG PO BID, (Reported)


Trazodone HCl 50 Mg Tablet, 50 MG PO HS PRN for SLEEP, (Reported)


Verapamil HCl 240 Mg Tablet.er, 240 MG PO DAILY, (Reported)


Vit A/C/E/Zinc/Co 1 Cap Capsule, 1 CAP PO BID, (Reported)





Patient Home Medication List


Home Medication List Reviewed:  Yes





Review of Systems


Constitutional:  No chills, No fever


EENTM:  no symptoms reported


Respiratory:  No cough, No short of breath


Cardiovascular:  No chest pain, No palpitations


Gastrointestinal:  No abdominal pain, No diarrhea; nausea


Genitourinary:  no symptoms reported


Pregnant:  No


Musculoskeletal:  back pain


Skin:  change in color


Psychiatric/Neurological:  No Symptoms Reported





Past Medical-Social-Family Hx


Past Med/Social Hx:  Reviewed Nursing Past Med/Soc Hx


Patient Social History


Alcohol Use:  Occasionally Uses


Alcohol Beverage of Choice:  Wine


Recreational Drug Use:  No


Smoking Status:  Former Smoker


Former Smoker, Quit:  Jan 1, 2000


2nd Hand Smoke Exposure:  No


Recent Foreign Travel:  No


Contact w/Someone Who Travel:  No


Recent Infectious Disease Expo:  No


Recent Hopitalizations:  No





Immunizations Up To Date


Tetanus Booster (TDap):  Unknown


Date of Pneumonia Vaccine:  Oct 1, 2009


Date of Influenza Vaccine:  Oct 15, 2016





Seasonal Allergies


Seasonal Allergies:  No





Past Medical History


Surgeries:  Yes


Coronary Stent, Eye Surgery, Gallbladder, Orthopedic, Tonsillectomy


Respiratory:  Yes (ON VENT POST OP AFTER HIF FX)


Pneumonia, COPD, Emphysema


Currently Using CPAP:  No


Currently Using BIPAP:  No


Cardiac:  Yes (CARDIAC STENT X1; NSTEMI)


Coronary Artery Disease, Heart Attack, High Cholesterol, Hypertension, Valvular 

Heart Disease


Neurological:  Yes (chronic dizziness)


Dementia, Vertigo


Pregnant:  No


Reproductive Disorders:  No


Female Reproductive Disorders:  Denies


GYN History:  Menopausal


Sexually Transmitted Disease:  No


HIV/AIDS:  No


Genitourinary:  Yes


Kidney Infection, UTI-Chronic


Gastrointestinal:  Yes (CHRONIC NAUSEA)


Colitis, Gastroesophageal Reflux


Musculoskeletal:  Yes


Arthritis, Fractures


Endocrine:  Yes


Hypothyroidsim


HEENT:  Yes (CATARACT SURGERY X 3)


Cataract


Cancer:  No


Psychosocial:  No


Integumentary:  No


Blood Disorders:  No





Family Medical History





Neoplasm (LUNG CANCER)


  G8 SISTER


Cancer, Hypertension





Physical Exam


Vital Signs





Vital Signs - First Documented








 10/28/18





 12:14


 


Temp 97.8


 


Pulse 80


 


Resp 20


 


B/P (MAP) 164/82 (109)


 


Pulse Ox 82


 


O2 Delivery Room Air





Capillary Refill : Less Than 3 Seconds


Height, Weight, BMI


Height: 5'3.00"


Weight: 106lbs. 0.0oz. [id number]; 24.4 BMI


Method:Stated


General Appearance:  Mild Distress, Thin


HEENT:  Normal ENT Inspection


Neck:  Normal Inspection, Non Tender, Supple


Cardiovascular:  Regular Rate, Rhythm


Respiratory:  Chest Non Tender, Lungs Clear


Gastrointestinal:  Normal Bowel Sounds, Non Tender, Soft


Back:  Normal Inspection, Vertebral Tenderness (over L4 and 5 posterior 

processes.)


Neurologic/Psychiatric:  Alert, No Motor/Sensory Deficits, Normal Mood/Affect


Skin:  Normal Color, Warm/Dry; No Rash





Progress/Results/Core Measures


Results/Orders


Lab Results





Laboratory Tests








Test


 10/28/18


12:20 10/28/18


13:22 Range/Units


 


 


White Blood Count


 6.0 


 


 4.3-11.0


10^3/uL


 


Red Blood Count


 3.29 L


 


 4.35-5.85


10^6/uL


 


Hemoglobin 11.3 L  11.5-16.0  G/DL


 


Hematocrit 35   35-52  %


 


Mean Corpuscular Volume 106 H  80-99  FL


 


Mean Corpuscular Hemoglobin 34   25-34  PG


 


Mean Corpuscular Hemoglobin


Concent 33 


 


 32-36  G/DL





 


Red Cell Distribution Width 17.0 H  10.0-14.5  %


 


Platelet Count


 289 


 


 130-400


10^3/uL


 


Mean Platelet Volume 10.6 H  7.4-10.4  FL


 


Neutrophils (%) (Auto) 62   42-75  %


 


Lymphocytes (%) (Auto) 27   12-44  %


 


Monocytes (%) (Auto) 8   0-12  %


 


Eosinophils (%) (Auto) 2   0-10  %


 


Basophils (%) (Auto) 0   0-10  %


 


Neutrophils # (Auto) 3.7   1.8-7.8  X 10^3


 


Lymphocytes # (Auto) 1.6   1.0-4.0  X 10^3


 


Monocytes # (Auto) 0.5   0.0-1.0  X 10^3


 


Eosinophils # (Auto)


 0.1 


 


 0.0-0.3


10^3/uL


 


Basophils # (Auto)


 0.0 


 


 0.0-0.1


10^3/uL


 


Sodium Level 141   135-145  MMOL/L


 


Potassium Level 3.0 L  3.6-5.0  MMOL/L


 


Chloride Level 100     MMOL/L


 


Carbon Dioxide Level 28   21-32  MMOL/L


 


Anion Gap 13   5-14  MMOL/L


 


Blood Urea Nitrogen 12   7-18  MG/DL


 


Creatinine


 0.68 


 


 0.60-1.30


MG/DL


 


Estimat Glomerular Filtration


Rate > 60 


 


  





 


BUN/Creatinine Ratio 18    


 


Glucose Level 95     MG/DL


 


Calcium Level 8.6   8.5-10.1  MG/DL


 


Corrected Calcium 9.0   8.5-10.1  MG/DL


 


Total Bilirubin 0.8   0.1-1.0  MG/DL


 


Aspartate Amino Transf


(AST/SGOT) 20 


 


 5-34  U/L





 


Alanine Aminotransferase


(ALT/SGPT) 15 


 


 0-55  U/L





 


Alkaline Phosphatase 116     U/L


 


Total Protein 6.4   6.4-8.2  GM/DL


 


Albumin 3.5   3.2-4.5  GM/DL


 


Urine Color  YELLOW   


 


Urine Clarity


 


 SLIGHTLY


CLOUDY  





 


Urine pH  8  5-9  


 


Urine Specific Gravity  1.010 L 1.016-1.022  


 


Urine Protein  2+ H NEGATIVE  


 


Urine Glucose (UA)  NEGATIVE  NEGATIVE  


 


Urine Ketones  2+ H NEGATIVE  


 


Urine Nitrite  NEGATIVE  NEGATIVE  


 


Urine Bilirubin  NEGATIVE  NEGATIVE  


 


Urine Urobilinogen  NORMAL  NORMAL  MG/DL


 


Urine Leukocyte Esterase  3+ H NEGATIVE  


 


Urine RBC (Auto)  5+ H NEGATIVE  


 


Urine RBC  >100 H  /HPF


 


Urine WBC  5-10 H  /HPF


 


Urine Squamous Epithelial


Cells 


 2-5 


  /HPF





 


Urine Crystals  NONE   /LPF


 


Urine Bacteria  TRACE   /HPF


 


Urine Casts  NONE   /LPF


 


Urine Mucus  NEGATIVE   /LPF


 


Urine Culture Indicated  YES   








My Orders





Orders - JOSE NAIDU MD


Cbc With Automated Diff (10/28/18 12:31)


Comprehensive Metabolic Panel (10/28/18 12:31)


Ua Culture If Indicated (10/28/18 12:31)


Chest 1 View, Ap/Pa Only (10/28/18 12:31)


Ns Iv 1000 Ml (Sodium Chloride 0.9%) (10/28/18 12:45)


Ondansetron Injection (Zofran Injectio (10/28/18 12:45)


Fentanyl  Injection (Sublimaze Injection (10/28/18 12:45)


Ct Lumbar Spine Wo (10/28/18 12:45)


Urine Culture (10/28/18 13:22)


Promethazine Injection (Phenergan Injec (10/28/18 14:30)


Diphenhydramine Injection (Benadryl Inje (10/28/18 14:30)


Rocephin 2 Gm Iv (1x Dose) (10/28/18 14:45)





Medications Given in ED





Current Medications








 Medications  Dose


 Ordered  Sig/Greg


 Route  Start Time


 Stop Time Status Last Admin


Dose Admin


 


 Fentanyl Citrate  50 mcg  ONCE  ONCE


 IVP  10/28/18 12:45


 10/28/18 12:46 DC 10/28/18 13:49


50 MCG


 


 Ondansetron HCl  4 mg  ONCE  ONCE


 IVP  10/28/18 12:45


 10/28/18 12:46 DC 10/28/18 12:30


4 MG








Vital Signs/I&O











 10/28/18





 12:14


 


Temp 97.8


 


Pulse 80


 


Resp 20


 


B/P (MAP) 164/82 (109)


 


Pulse Ox 82


 


O2 Delivery Room Air














Blood Pressure Mean:  109











Progress


Progress Note :  


   Time:  14:32


Progress Note


Patient was given 4 mg Zofran IV and subsequent 50 g of fentanyl IV with good 

improvement in her nausea and pain.





I suffers ordered Phenergan and Benadryl for her residual nausea as she has 

tolerated this phenothiazine well for nausea in the past.





Patient's laboratory evaluation demonstrated a UTI.  The patient's LS-spine 

films and MRI both demonstrated a progressive compression fracture of L1 from 

previous studies.  I ordered a CT of the lumbar spine for orthopedic evaluation 

in the morning.





Telephone consultation was undertaken with Dr. Hill who was kind enough to 

admit the patient.





Departure


Communication (Admissions)


Time/Spoke to Admitting Phy:  14:34


Dr. Hill





Impression





 Primary Impression:  


 Back pain at L4-L5 level


Disposition:  09 ADMITTED AS INPATIENT


Condition:  Improved





Admissions


Decision to Admit Reason:  Admit from ER (General)


Decision to Admit/Date:  Oct 28, 2018


Time/Decision to Admit Time:  14:35





Departure-Patient Inst.


Referrals:  


MOSES CHAIREZ MD (PCP/Family)


Primary Care Physician











JOSE NAIDU MD Oct 28, 2018 12:44

## 2018-10-28 NOTE — DIAGNOSTIC IMAGING REPORT
INDICATION: Back pain. 



COMPARISON: Study of 4/28/2018. 



EXAMINATION: Single view of the chest was obtained.



FINDINGS: Cardiomegaly, air trapping and COPD are all chronic

findings. Tortuosity of the atherosclerotic thoracic aorta. No

free air beneath the diaphragms. 



IMPRESSION: No acute finding or appreciable change from previous

exams. 



Dictated by: 



  Dictated on workstation # [id number]

## 2018-10-28 NOTE — DIAGNOSTIC IMAGING REPORT
PROCEDURE: CT lumbar spine without contrast.



TECHNIQUE: Multiple contiguous axial images were obtained through

the lumbar spine without the use of intravenous contrast.

Sagittal and coronal reformations were then performed.



INDICATION:  Back pain.



COMPARISON: Lumbar spine MRI of 10/27/2018. CT lumbar spine of

07/29/2009.



FINDINGS: Levocurvature lumbar spine with apex at L1-L2 is

unchanged since 2009. The superior endplate compression fracture

of L1 is unchanged since MRI of prior day and as indicated on

that examination has progressed since 2009. No additional

compression or burst fracture within the lumbar spine. No

insufficiency fracture within the visualized sacrum.



There are multilevel degenerative disc disease which are greatest

at L2-L3 and L1-L2. There is also grade 1 anterolisthesis of L5

on S1 secondary to advanced facet disease. The degree of spinal

canal narrowing was better assessed on recent MRI. No

retroperitoneal abnormality of concern. Atherosclerotic plaquing

is present.



IMPRESSION:

1. L1 superior endplate compression fracture maintains stable

alignment and position compared to MRI of prior day. Better

assessed on MRI prior day, this fracture is likely subacute to

chronic in nature with edema associated with being degenerative

in nature.

2. No additional fractures in the lumbar spine.

3. Grade 1 anterolisthesis of L5 on S1 secondary to severe facet

disease and unchanged since 2009.



Dictated by: 



  Dictated on workstation # [id number]

## 2018-10-29 VITALS — DIASTOLIC BLOOD PRESSURE: 70 MMHG | SYSTOLIC BLOOD PRESSURE: 148 MMHG

## 2018-10-29 VITALS — SYSTOLIC BLOOD PRESSURE: 156 MMHG | DIASTOLIC BLOOD PRESSURE: 77 MMHG

## 2018-10-29 VITALS — DIASTOLIC BLOOD PRESSURE: 79 MMHG | SYSTOLIC BLOOD PRESSURE: 168 MMHG

## 2018-10-29 VITALS — SYSTOLIC BLOOD PRESSURE: 173 MMHG | DIASTOLIC BLOOD PRESSURE: 71 MMHG

## 2018-10-29 VITALS — SYSTOLIC BLOOD PRESSURE: 158 MMHG | DIASTOLIC BLOOD PRESSURE: 73 MMHG

## 2018-10-29 VITALS — DIASTOLIC BLOOD PRESSURE: 82 MMHG | SYSTOLIC BLOOD PRESSURE: 190 MMHG

## 2018-10-29 RX ADMIN — SODIUM CHLORIDE AND POTASSIUM CHLORIDE SCH MLS/HR: 9; 1.49 INJECTION, SOLUTION INTRAVENOUS at 20:06

## 2018-10-29 RX ADMIN — FENTANYL CITRATE PRN MCG: 50 INJECTION, SOLUTION INTRAMUSCULAR; INTRAVENOUS at 10:22

## 2018-10-29 RX ADMIN — SODIUM CHLORIDE SCH MLS/HR: 900 INJECTION INTRAVENOUS at 15:56

## 2018-10-29 RX ADMIN — SODIUM CHLORIDE AND POTASSIUM CHLORIDE SCH MLS/HR: 9; 1.49 INJECTION, SOLUTION INTRAVENOUS at 10:21

## 2018-10-29 RX ADMIN — ACETAMINOPHEN PRN MG: 325 TABLET ORAL at 09:24

## 2018-10-29 RX ADMIN — ONDANSETRON PRN MG: 2 INJECTION, SOLUTION INTRAMUSCULAR; INTRAVENOUS at 10:26

## 2018-10-29 NOTE — HISTORY & PHYSICIAL
History of Present Illness


History of Present Illness


Reason for visit/HPI


PT IS AN 88 Y/O FEMALE WHO IS KNOWN TO ME FROM CLINIC.


SHE HAD FALLEN A FEW WEEKS AGO, HAD PROGRESSIVE BACK PAIN - HAD AN IMAGING 

STUDY WHICH SHOWED LUMBAR COMPRESSION FRACTURE - SHE WAS TOLD SHE MAY BE A 

CANDIDATE FOR KYPHOPLASTY, HOWEVER SHE HAS YET TO BE SEEN BY SPECIALIST PRIOR 

TO HER PAIN PROGRESSING AND PRESENTING TO THE EMERGENCY DEPARTMENT.


SHE WAS FOUND TO HAVE A URINARY TRACT INFECTION AS WELL AND WAS THUS ADMITTED 

TO THE HOSPITAL FOR TREATMENT OF UTI AND CONSULT TO ORTHOPEDIC SURGEON FOR 

POSSIBLE KYPHOPLASTY.


Date of Admission


Oct 28, 2018 at 14:30


Date Seen by a Provider:  Oct 29, 2018


Time Seen by a Provider:  08:30


I consulted on this patient on


10/29/18


 08:37


Attending Physician


Moses Hernández MD


Admitting Physician


Moses Hernández MD


Consult








Allergies and Home Medications


Allergies


Coded Allergies:  


     prednisone (Verified  Allergy, Unknown, 10/28/18)


     codeine (Verified  Adverse Reaction, Mild, N/V, SWEATS, 10/28/18)


Uncoded Allergies:  


     NARCOTICS (Adverse Reaction, Intermediate, 13)





Home Medications


Albuterol Sulfate 18 Gm Hfa.aer.ad, 2 PUFF INH Q4H PRN for SHORTNESS OF BREATH, 

(Reported)


Atorvastatin Calcium 10 Mg Tablet, 5 MG PO HS, (Reported)


   TAKES 1/2 (10MG) TABLET 


Clopidogrel Bisulfate 75 Mg Tablet, 75 MG PO DAILY, (Reported)


Enalapril Maleate 10 Mg Tablet, 10 MG PO DAILY, (Reported)


L.acidoph & Paracasei,B.lactis 1 Each Capsule, 1 CAP PO BID, (Reported)


Levothyroxine Sodium 100 Mcg Tablet, 100 MCG PO DAILY, (Reported)


Meclizine HCl 25 Mg Tablet, 25 MG PO BID PRN for VERTIGO, (Reported)


Metoprolol Tartrate 25 Mg Tablet, 25 MG PO BID, (Reported)


Montelukast Sodium 10 Mg Tablet, 10 MG PO HS, (Reported)


Ondansetron 4 Mg Tab.rapdis, 4 MG PO TID PRN for NAUSEA/VOMITING-1ST LINE, (

Reported)


   PAIN MEDICATIONS CAUSE NAUSEA 


Pantoprazole Sodium 40 Mg Tablet.dr, 40 MG PO DAILY, (Reported)


Tolterodine Tartrate 1 Mg Tablet, 1 MG PO BID, (Reported)


Tramadol HCl 50 Mg Tablet, 50 MG PO TID PRN for PAIN-MODERATE, (Reported)


Trazodone HCl 50 Mg Tablet, 50 MG PO HS, (Reported)


Verapamil HCl 240 Mg Tablet.er, 240 MG PO DAILY, (Reported)


Vit A/C/E/Zinc/Co 1 Cap Capsule, 1 CAP PO BID, (Reported)





Patient Home Medication List


Home Medication List Reviewed:  Yes





Past Medical-Social-Family Hx


Patient Social History


Marrital Status:  


Living Status:  lives at home with aide from family


Employed/Student:  retired


Alcohol Use:  Occasionally Uses


Number of Drinks Today:  


Alcohol Beverage of Choice:  Wine


Recreational Drug Use:  No


Smoking Status:  Former Smoker


Former Smoker, Quit:  2000


Type Used:  Cigarettes


2nd Hand Smoke Exposure:  No


Physical Abuse Screen:  No


Sexual Abuse:  No


Recent Foreign Travel:  No


Contact w/other who traveled:  No


Recent Hopitalizations:  No


Recent Infectious Disease Expo:  No





Immunizations Up To Date


Tetanus Booster (TDap):  Unknown


Date of Pneumonia Vaccine:  Oct 1, 2009


Date of Influenza Vaccine:  Oct 1, 2018





Seasonal Allergies


Seasonal Allergies:  No





Surgeries


Yes


Coronary Stent, Eye Surgery, Gallbladder, Orthopedic, Tonsillectomy





Respiratory


Yes (ON VENT POST OP AFTER HIF FX)


Currently Using CPAP:  No


Currently Using BIPAP:  No





Cardiovascular


Yes (CARDIAC STENT X1; NSTEMI)


Coronary Artery Disease, Heart Attack, High Cholesterol, Hypertension, Valvular 

Heart Disease





Neurological


Yes (chronic dizziness)


Dementia, Vertigo





Reproductive System


Pregnant:  No


Hx Reproductive Disorders:  No


Sexually Transmitted Disease:  No


HIV/AIDS:  No


Female Reproductive Disorders:  Denies


GYN History:  Menopausal





Genitourinary


Yes


Kidney Infection, UTI-Chronic





Gastrointestinal


Yes (CHRONIC NAUSEA)


Colitis, Gastroesophageal Reflux





Musculoskeletal


Yes


Arthritis, Fractures





Endocrine


History of Endocrine Disorders:  Yes


Endocrine Disorders:  Hypothyroidsim





HEENT


History of HEENT Disorders:  Yes (CATARACT SURGERY X 3)


HEENT Disorders:  Cataract





Cancer


No





Psychosocial


History of Psychiatric Problem:  No





Integumentary


History of Skin or Integumenta:  No





Blood Transfusions


History of Blood Disorders:  No





Reviewed Nursing Assessment


Reviewed/Agree w Nursing PMH:  Yes





Family Medical History


Significant Family History:  Cancer, Hypertension


Family Hx:  


Neoplasm (LUNG CANCER)


  G8 SISTER





Review of Systems


Constitutional:  No chills, No dizziness, No malaise; weakness


EENTM:  No mouth pain, No throat pain


Respiratory:  No cough, No dyspnea on exertion


Cardiovascular:  No chest pain, No palpitations


Gastrointestinal:  No abdominal pain, No constipation, No diarrhea


Genitourinary:  No dysuria


Musculoskeletal:  back pain, muscle weakness


Skin:  No lesions


Psychiatric/Neurological:  Denies Anxiety, Denies Depressed





All Other Systems Reviewed


Negative Unless Noted:  Yes





Physical Exam


Vital Signs





Vital Signs - First Documented








 10/28/18 10/28/18





 12:14 16:23


 


Temp 97.8 


 


Pulse 80 


 


Resp 20 


 


B/P (MAP) 164/82 (109) 


 


Pulse Ox 82 


 


O2 Delivery Room Air 


 


O2 Flow Rate  2.00





Capillary Refill : Less Than 3 SecondsLess Than 3 Seconds


Height, Weight, BMI


Height: 5'3.00"


Weight: 106lbs. 0.0oz. [id number]; 18.8 BMI


Method:Stated


General Appearance:  No Apparent Distress, WD/WN


Eyes:  Bilateral Eye Normal Inspection, Bilateral Eye PERRL, Bilateral Eye EOMI


HEENT:  PERRL/EOMI, TMs Normal, Normal ENT Inspection, Pharynx Normal


Neck:  Full Range of Motion, Normal Inspection, Non Tender, Supple


Respiratory:  Chest Non Tender, Lungs Clear, Normal Breath Sounds, No Accessory 

Muscle Use, No Respiratory Distress


Cardiovascular:  Regular Rate, Rhythm, Normal Peripheral Pulses


Gastrointestinal:  Normal Bowel Sounds, Non Tender, Soft


Back:  Other (tender in low back)


Extremity:  Normal Capillary Refill, Non Tender, No Calf Tenderness, No Pedal 

Edema


Neurologic/Psychiatric:  Alert, Oriented x3, Normal Mood/Affect


Skin:  Warm/Dry





Assessment/Plan


Assessment and Plan


L1 COMPRESSION FRACTURE


PROGRESSIVE WEAKNESS


URINARY TRACT INFECTION





L1 COMPRESSION FRACTURE - CONSULT WAS PLACED TO DR. VILLALOBOS ON PT ADMISSION - 

SHE HAD MRI THIS AFTERNOON WHICH SHOWED BILATERAL PELVIC FX - START ON  

CALCITONIN NASAL SPRAY FOR NOW, MONITOR  SYMPTOMS - WILL CONSIDER TYLMOS ON 

DISCHARGE.





PROGRESSIVE WEAKNESS - START PHYSICAL THERAPY.





URINARY TRACT INFECTION - CONTINUE WITH IV ANTIBIOTICS FOR NOW.





DISCUSSED WITH PT - WILL NEED TO CONSIDER PHYSICAL THERAPY AND NURSING HOME 

PLACEMENT.





Admission Diagnosis


L1 COMPRESSION FRACTURE


PROGRESSIVE WEAKNESS


URINARY TRACT INFECTION


Admission Status:  Inpatient Order (span 2 midnights)


Reason for Inpatient Admission:  


COMPRESSION FX, SACRAL FX AND URINARY TRACT INFECTION, WILL NEED MORE THAN


2 MIDNIGHTS FOR STABILIZATION OF SYMPTOMS.





Clinical Quality Measures


DVT/VTE Risk/Contraindication:


Risk Factor Score Per Nursin


RFS Level Per Nursing on Admit:  4+=Very High











MOSES HERNÁNDEZ MD Oct 29, 2018 08:39

## 2018-10-29 NOTE — CONSULTATION
History of Present Illness


History of Present Illness


Patient Consulted On(beto/time)


10/29/18


 15:27


Date Seen by Provider:  Oct 29, 2018


Time Seen by Provider:  15:28


History of Present Illness


Patient reports chronic low back and sacral pain. She has recently had a fall, 

and landed on her buttocks. She is unable to tell me when this fall occurred, 

but review of her chart reveals that it has been a couple of weeks. She states 

that her pain has been worse since her fall. Multiple radiologic images have 

been performed, and we have been consulted for her L1 compression fracture. 

However, a lumbar MRI has also been performed, and reveals that her L1 

compression fracture is chronic in nature. There is mild bony edema at L1-2, 

which is secondary to degenerative changes. MRI also reveals a L4-5 and L5-S1 

degenerative spondylolisthesis with severe facet disease. She does not have 

significant stenosis present.





Allergies and Home Medications


Allergies


Coded Allergies:  


     prednisone (Verified  Allergy, Unknown, 10/28/18)


     codeine (Verified  Adverse Reaction, Mild, N/V, SWEATS, 10/28/18)


Uncoded Allergies:  


     NARCOTICS (Adverse Reaction, Intermediate, 13)





Home Medications


Albuterol Sulfate 18 Gm Hfa.aer.ad, 2 PUFF INH Q4H PRN for SHORTNESS OF BREATH, 

(Reported)


Atorvastatin Calcium 10 Mg Tablet, 5 MG PO HS, (Reported)


   TAKES 1/2 (10MG) TABLET 


Clopidogrel Bisulfate 75 Mg Tablet, 75 MG PO DAILY, (Reported)


Enalapril Maleate 10 Mg Tablet, 10 MG PO DAILY, (Reported)


L.acidoph & Paracasei,B.lactis 1 Each Capsule, 1 CAP PO BID, (Reported)


Levothyroxine Sodium 100 Mcg Tablet, 100 MCG PO DAILY, (Reported)


Meclizine HCl 25 Mg Tablet, 25 MG PO BID PRN for VERTIGO, (Reported)


Metoprolol Tartrate 25 Mg Tablet, 25 MG PO BID, (Reported)


Montelukast Sodium 10 Mg Tablet, 10 MG PO HS, (Reported)


Ondansetron 4 Mg Tab.rapdis, 4 MG PO TID PRN for NAUSEA/VOMITING-1ST LINE, (

Reported)


   PAIN MEDICATIONS CAUSE NAUSEA 


Pantoprazole Sodium 40 Mg Tablet.dr, 40 MG PO DAILY, (Reported)


Tolterodine Tartrate 1 Mg Tablet, 1 MG PO BID, (Reported)


Tramadol HCl 50 Mg Tablet, 50 MG PO TID PRN for PAIN-MODERATE, (Reported)


Trazodone HCl 50 Mg Tablet, 50 MG PO HS, (Reported)


Verapamil HCl 240 Mg Tablet.er, 240 MG PO DAILY, (Reported)


Vit A/C/E/Zinc/Co 1 Cap Capsule, 1 CAP PO BID, (Reported)





Patient Home Medication List


Home Medication List Reviewed:  Yes





Past Medical-Social-Family Hx


Past Med/Social Hx:  Reviewed Nursing Past Med/Soc Hx


Patient Social History


Alcohol Use:  Occasionally Uses


Number of Drinks Today:  


Alcohol Beverage of Choice:  Wine


Recreational Drug Use:  No


Smoking Status:  Former Smoker


Type Used:  Cigarettes


Former Smoker, Quit:  2000


2nd Hand Smoke Exposure:  No


Recent Foreign Travel:  No


Contact w/Someone Who Travel:  No


Recent Infectious Disease Expo:  No


Recent Hopitalizations:  No





Immunizations Up To Date


Tetanus Booster (TDap):  Unknown


Date of Pneumonia Vaccine:  Oct 1, 2009


Date of Influenza Vaccine:  Oct 1, 2018





Seasonal Allergies


Seasonal Allergies:  No





Past Medical History


Surgeries:  Yes


Coronary Stent, Eye Surgery, Gallbladder, Orthopedic, Tonsillectomy


Respiratory:  Yes (ON VENT POST OP AFTER HIF FX)


Pneumonia, COPD, Emphysema


Currently Using CPAP:  No


Currently Using BIPAP:  No


Cardiac:  Yes (CARDIAC STENT X1; NSTEMI)


Coronary Artery Disease, Heart Attack, High Cholesterol, Hypertension, Valvular 

Heart Disease


Neurological:  Yes (chronic dizziness)


Dementia, Vertigo


Pregnant:  No


Reproductive Disorders:  No


Female Reproductive Disorders:  Denies


GYN History:  Menopausal


Sexually Transmitted Disease:  No


HIV/AIDS:  No


Genitourinary:  Yes


Kidney Infection, UTI-Chronic


Gastrointestinal:  Yes (CHRONIC NAUSEA)


Colitis, Gastroesophageal Reflux


Musculoskeletal:  Yes


Arthritis, Fractures


Endocrine:  Yes


Hypothyroidsim


HEENT:  Yes (CATARACT SURGERY X 3)


Cataract


Cancer:  No


Psychosocial:  No


Integumentary:  No


Blood Disorders:  No





Family Medical History





Neoplasm (LUNG CANCER)


  G8 SISTER


Cancer, Hypertension





Review of Systems-General


Constitutional:  No chills, No dizziness; malaise, weakness


Cardiovascular:  no symptoms reported


Gastrointestinal:  no symptoms reported


Musculoskeletal:  back pain


Psychiatric/Neurological:  Denies Numbness, Denies Paresthesia, Denies Tremors





Physical Exam-General Problems


Physical Exam


Vital Signs





Vital Signs - First Documented








 10/28/18 10/28/18





 12:14 16:23


 


Temp 97.8 


 


Pulse 80 


 


Resp 20 


 


B/P (MAP) 164/82 (109) 


 


Pulse Ox 82 


 


O2 Delivery Room Air 


 


O2 Flow Rate  2.00





Capillary Refill : Less Than 3 SecondsLess Than 3 Seconds


General Appearance:  WD/WN, no apparent distress


Eyes:  Bilateral Eye Normal Inspection


Neck:  non-tender


Respiratory:  no accessory muscle use, respiratory distress


Cardiovascular:  normal peripheral pulses


Back:  normal inspection


Extremities:  No calf tenderness, No slow capillary refill, No swelling; other (

Tenderness with palpation over the sacrum and SI region. Mild pain with 

bilateral Dale's manuever. Negative straight leg raise. No tenderness over 

the upper lumbar region)


Neurologic/Psychiatric:  CNs II-XII nml as tested, no motor/sensory deficits, 

alert, normal mood/affect, oriented x 3





Assessment/Plan


Assessment/Plan


Admission Diagnosis/Plan


Lumbago 


Sacral pain


Osteoporosis 





The patient's lumbar MRI does not extend into the pelvis. I am concerned that 

her pain is secondary to a sacral insufficiency fracture. I will obtain a MRI 

pelvis at this time. The patient's L1 compression fracture is chronic and does 

not require any form of treatment. We will follow up with recommendation, after 

her pelvic MRI is completed.





Clinical Quality Measures


DVT/VTE Risk/Contraindication:


Risk Factor Score Per Nursin


RFS Level Per Nursing on Admit:  4+=Very High











ROBERTO MORRIS Oct 29, 2018 15:33

## 2018-10-29 NOTE — DIAGNOSTIC IMAGING REPORT
PROCEDURE: MRI pelvis without contrast.



TECHNIQUE: Multiplanar, multisequence MRI of the pelvis was

performed without contrast.



INDICATION: Lower back pain.



COMPARISON: Lumbar spine MRI of 10/27/2018.



FINDINGS: There is non-masslike edema extending in a vertical

fashion within the bilateral sacral ala. It also extends in a

horizontal fashion across the S2 and S3 vertebral bodies. These

imaging features are compatible with nondisplaced acute to

subacute insufficiency fracture. There is no involvement of the

sacral neuroforamen. Mild degenerative changes of SI joints are

noted.



No free pelvic fluid. The uterus is normal in appearance. Colonic

diverticulosis.



IMPRESSION:

1. Acute to subacute nondisplaced bilateral sacral alar

insufficiency fractures.



Dictated by: 



  Dictated on workstation # [id number]

## 2018-10-29 NOTE — PHYSICAL THERAPY PROGRESS NOTE
Therapy Progress Note


Patient adamantly declined PT on this date due to depression.  Patient reports 

she has a catheter and is embarrassed.  PT attempted to encourage/educate 

patient on importance of increasing her activity, however, patient became 

increasingly agitated and continued to decline therapy intervention. PT will 

attempt in a.m.


1 ref











KAVITA PERDOMO PT Oct 29, 2018 13:49

## 2018-10-29 NOTE — OCCUPATIONAL THERAPY EVAL
OT Evaluation-General/PLF


Medical Diagnosis


Admission Date


Oct 28, 2018 at 14:30


Medical Diagnosis:  back pain


Onset Date:  Oct 28, 2018





Therapy Diagnosis


Therapy Diagnosis:  decr self care, decr funct mob, decr act sana, weakness





Height/Weight


Height (Feet):  5


Height (Inches):  3.00


Weight (Pounds):  106


Weight (Ounces):  0.0





Precautions


Precautions/Isolations:  Fall Prevention, Standard Precautions, Pressure Ulcer


Safety Interventions:  Bed Exit Alarm, Reorient-PRN





Referral


Physician:  Edgar


Referral Reason:  Evaluation/Treatment





Medical History


Pertinent Medical History:  Arthritis, CAD, COPD, GERD, HTN, Hypothroidism, MI


Additional Medical History


Cataract surgery, HLP, valvular disease., hip fx. Pneumonia. chronic dizziness, 

vertigo. Dementia. Chronic UTI. Colitis


Current History


Fell several weeks ago and has lumbar-sacral pain.  L1 vertebral fx, reported 

to be candidate for kyphoplasty.


Reviewed History:  Yes





Social History


Home:  Apartment ([facility])


Current Living Status:  Alone





ADL-Prior Level of Function


              Functional [place] Measure


0=Not Assessed/NA   4=Minimal Assistance


1=Total Assistance   5=Supervision or Setup


2=Maximal Assistance   6=Modified [place]


3=Moderate Assistance   7=Complete [place]


ADL PLOF Comments


Pr reported that she has been able to manage her basic self care needs but she 

is afraid to get in and out of her bathtub.  She is retired from office work 

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 

family.


Self Care





Functional Cognition


Functional Cognition: Unknown if she gets help with meds.


DME/Equipment:  Bath Chair, Grab Bars, Shower Hose Extender, Tub/Shower, Toilet/

Riser





OT Current Status


Subjective


Pt seen in room, sideling in bed. Agreeable to OT "if you don't make me get up.

" Pt reported pain 0/10





Appearance


Alert, cooperative





Mental Status/Objective


Patient Orientation:  Person, Place, Time (year and close on month), Situation


Attachments:  Rose Catheter, IV, Oxygen





Current


Glasses/Contacts:  Yes


Hand Dominance:  Right


Upper Extremity ROM


Grossly WFL bilat


Upper Extremity Strength


Grossly 4/5 bilat





ADL-Treatment


ADL-Current


Nursing reported that she got up to toilet earlier today with one person assist

, to BSC. Has not been out of bed with PT yet and doesn't want to get up today.

  Able to feed herself.  Waiting for orthopedic consult


              Functional [place] Measure


0=Not Assessed/NA   4=Minimal Assistance


1=Total Assistance   5=Supervision or Setup


2=Maximal Assistance   6=Modified [place]


3=Moderate Assistance   7=Complete [place] Quality Coding Scale











6 Independent with activity with or without an assistive device


 


5  Patient requires set up or clean up by helper.  Patient completes activity  

by  themselves


 


4 Supervision or touching assist (CGA). Painter provide cues , steadying assist


 


3 The helper provides less than half the effort to complete the activity


 


2 The helper provides more than half the effort to complete the activity


 


1 Dependent.  The helper does all the effort to complete an activity 


 


7 Patient refused to complete or attempt activity


 


9 The patient did not perform the activity before the current illness or injury


 


88 Not attempted due to Medical conditions or safety concerns











Education


OT Patient Education:  Purpose of tx/functional activities, Rehab process


Teaching Recipient:  Patient


Teaching Methods:  Discussion


Response to Teaching:  Verbalize Understanding





OT Long Term Goals


Long Term Goals


Time Frame:  Nov 5, 2018


Eating (FIM):  6


Grooming(FIM):  6


Bathing(FIM):  5


Upper Body Dressing(FIM):  5


Lower Body Dressing(FIM):  5


Toileting(FIM):  5


Toilet/Commode Transfer(FIM):  5


Shower Transfer(FIM):  5


Additional Goals:  1-Demonstrate ADL Tasks, 2-Verbalize Understanding, 3-

ImproveStrength/Atif


1=Demonstrate adherence to instructed precautions during ADL tasks.


2=Patient will verbalize/demonstrate understanding of assistive devices/

modifications for ADL.


3=Patient will improve strength/tolerance for activity to enable patient to 

perform ADL's.





OT Education/Plan


Problem List/Assessment


Assessment:  Decreased Activ Tolerance, Decreased UE Strength, Dependent 

Transfers, Impaired Self-Care Skills


Pt would benefit from skilled OT to increase her independence in basic self 

care to allow her to safely return home





Discharge Recommendations


Plan/Recommendations:  Continue POC





Treatment Plan/Plan of Care


Treatment,Training & Education:  Yes


Patient would benefit from OT for education, treatment and training to promote 

independence in ADL's, mobility, safety and/or upper extremity function for ADL'

s.


Plan of Care:  ADL Retraining, Functional Mobility, UE Funct Exercise/Act, UE 

Neuromus Re-Ed/Coord


Treatment Duration:  Nov 5, 2018


Frequency:  5 times per week


Estimated Hrs Per Day:  .5 hour per day


Agreement:  Yes


Rehab Potential:  Fair





Time/GCodes


Start Time:  13:28


Stop Time:  13:42


Total Time Billed (hr/min):  14


Billed Treatment Time


visit, 14 minutes evaluation moderate intensity











JOSUÉ DOOLEY OT Oct 29, 2018 14:05

## 2018-10-30 VITALS — DIASTOLIC BLOOD PRESSURE: 82 MMHG | SYSTOLIC BLOOD PRESSURE: 185 MMHG

## 2018-10-30 VITALS — SYSTOLIC BLOOD PRESSURE: 184 MMHG | DIASTOLIC BLOOD PRESSURE: 81 MMHG

## 2018-10-30 VITALS — SYSTOLIC BLOOD PRESSURE: 164 MMHG | DIASTOLIC BLOOD PRESSURE: 77 MMHG

## 2018-10-30 VITALS — DIASTOLIC BLOOD PRESSURE: 58 MMHG | SYSTOLIC BLOOD PRESSURE: 107 MMHG

## 2018-10-30 LAB
BUN/CREAT SERPL: 9
CALCIUM SERPL-MCNC: 8.4 MG/DL (ref 8.5–10.1)
CHLORIDE SERPL-SCNC: 100 MMOL/L (ref 98–107)
CO2 SERPL-SCNC: 27 MMOL/L (ref 21–32)
CREAT SERPL-MCNC: 0.58 MG/DL (ref 0.6–1.3)
GFR SERPLBLD BASED ON 1.73 SQ M-ARVRAT: > 60 ML/MIN
GLUCOSE SERPL-MCNC: 103 MG/DL (ref 70–105)
POTASSIUM SERPL-SCNC: 3.4 MMOL/L (ref 3.6–5)
SODIUM SERPL-SCNC: 139 MMOL/L (ref 135–145)

## 2018-10-30 RX ADMIN — ONDANSETRON PRN MG: 2 INJECTION, SOLUTION INTRAMUSCULAR; INTRAVENOUS at 09:14

## 2018-10-30 RX ADMIN — CALCITONIN SALMON SCH ML: 200 SPRAY, METERED NASAL at 10:03

## 2018-10-30 RX ADMIN — LEVOTHYROXINE SODIUM SCH MCG: 100 TABLET ORAL at 09:21

## 2018-10-30 RX ADMIN — ONDANSETRON PRN MG: 2 INJECTION, SOLUTION INTRAMUSCULAR; INTRAVENOUS at 00:18

## 2018-10-30 RX ADMIN — VERAPAMIL HYDROCHLORIDE SCH MG: 240 TABLET, FILM COATED, EXTENDED RELEASE ORAL at 09:21

## 2018-10-30 RX ADMIN — SODIUM CHLORIDE AND POTASSIUM CHLORIDE SCH MLS/HR: 9; 1.49 INJECTION, SOLUTION INTRAVENOUS at 00:19

## 2018-10-30 RX ADMIN — ACETAMINOPHEN PRN MG: 325 TABLET ORAL at 18:54

## 2018-10-30 RX ADMIN — TOLTERODINE TARTRATE SCH MG: 2 CAPSULE, EXTENDED RELEASE ORAL at 09:21

## 2018-10-30 RX ADMIN — METOPROLOL TARTRATE SCH MG: 25 TABLET, FILM COATED ORAL at 09:21

## 2018-10-30 RX ADMIN — METOPROLOL TARTRATE SCH MG: 25 TABLET, FILM COATED ORAL at 20:35

## 2018-10-30 RX ADMIN — ENALAPRIL MALEATE SCH MG: 10 TABLET ORAL at 09:21

## 2018-10-30 RX ADMIN — Medication SCH EACH: at 17:43

## 2018-10-30 RX ADMIN — FAMOTIDINE SCH MG: 20 TABLET, FILM COATED ORAL at 18:48

## 2018-10-30 RX ADMIN — CLOPIDOGREL BISULFATE SCH MG: 75 TABLET, FILM COATED ORAL at 09:21

## 2018-10-30 RX ADMIN — FENTANYL CITRATE PRN MCG: 50 INJECTION, SOLUTION INTRAMUSCULAR; INTRAVENOUS at 00:18

## 2018-10-30 RX ADMIN — LIDOCAINE SCH EA: 50 PATCH CUTANEOUS at 10:02

## 2018-10-30 RX ADMIN — SODIUM CHLORIDE SCH MLS/HR: 900 INJECTION INTRAVENOUS at 15:13

## 2018-10-30 RX ADMIN — Medication SCH EACH: at 09:21

## 2018-10-30 RX ADMIN — PANTOPRAZOLE SODIUM SCH MG: 40 TABLET, DELAYED RELEASE ORAL at 09:21

## 2018-10-30 NOTE — PHYSICAL THERAPY EVALUATION
PT Evaluation-General


Medical Diagnosis


Admission Date


Oct 28, 2018 at 14:30


Medical Diagnosis:  back pain


Onset Date:  Oct 28, 2018





Therapy Diagnosis


Therapy Diagnosis:  generalized weakness/debility





Height/Weight


Height (Feet):  5


Height (Inches):  3.00


Weight (Pounds):  106


Weight (Ounces):  0.0





Precautions


Precautions/Isolations:  Fall Prevention, Standard Precautions





Weight Bear Status


Right Lower Extremity:  Right


Full Weight Bearing


Left Lower Extremity:  Left


Full Weight Bearing





Referral


Physician:  Edgar


Reason for Referral:  Evaluation/Treatment





Medical History


Pertinent Medical History:  Arthritis, CAD, COPD, GERD, HTN, Hypothroidism, MI


Current History


s/p fall at home with inability to care for self


Reviewed History:  Yes





Social History


Home:  Single Level


Current Living Status:  Alone





Prior/Core FIM


Prior Level of Function


              Functional [place] Measure


0=Not Assessed/NA   4=Minimal Assistance


1=Total Assistance   5=Supervision or Setup


2=Maximal Assistance   6=Modified [place]


3=Moderate Assistance   7=Complete IndependenceIRFPAI Quality Coding Scale











6 Independent with activity with or without an assistive device


 


5  Patient requires set up or clean up by helper.  Patient completes activity  

by  themselves


 


4 Supervision or touching assist (CGA). [place] provide cues , steadying assist


 


3 The helper provides less than half the effort to complete the activity


 


2 The helper provides more than half the effort to complete the activity


 


1 Dependent.  The helper does all the effort to complete an activity 


 


7 Patient refused to complete or attempt activity


 


9 The patient did not perform the activity before the current illness or injury


 


88 Not attempted due to Medical conditions or safety concerns








Bed Mobility:  6


Transfers (B,C,W/C) (FIM):  6


Gait:  6





PT Evaluation-Current


Subjective


Patient agrees to PT.  She does c/o sacral pain with medication issued.





Pain





   Numeric Pain Scale:  10-Worst Possible Pain


   Location Body Site:  Sacrum


   Pain Description:  Acute





Objective


Patient Orientation:  Normal For Age


Problem Solving:  Fair





ROM/Strength


ROM Lower Extremities


bilateral LE WFL


Strength Lower Extremities


3+/5 grossly bilaterally





Integumentary/Posture


Integumentary


multiple contusions


Bowel Incontinence:  No


Bladder Incontinence:  No


Posture


kyphotic





Neuromuscular


(Tone, Coordination, Reflexes)


diminished coordination due to inactivity





Sensory


Vision:  Functional


Hearing:  Functional


Hand Dominance:  Right


Sensation Right Lower Extremit:  Intact


Sensation Left Lower Extremity:  Intact





Transfers


              Functional [place] Measure


0=Not Assessed/NA   4=Minimal Assistance


1=Total Assistance   5=Supervision or Setup


2=Maximal Assistance   6=Modified [place]


3=Moderate Assistance   7=Complete [place]


Transfers (B, C, W/C) (FIM):  5


Scootin


Rollin


Supine to/from Sit:  5


Sit to/from Stand:  5


patient transferred self commode to bed without difficulty





Gait


Mode of Locomotion:  Walk


Anticipated Mode of Locomotion:  Both


Comments/Gait Description


attempted to ambulate, however, c/o too much sacral pain





Balance


Sitting Static:  Normal


Sitting Dynamic:  Normal


Standing Static:  Fair


 Standing Dynamic:  Fair





Assessment/Needs


87 y.o. female, will be seen short term by skilled PT to address functional 

mobility.  Patient is currently limited by sacral pain and depression.


Rehab Potential:  Fair





PT Short Term Goals


Short Term Goals


Time Frame:  2018


Transfers (B,C,W/C) (FIM):  5


Gait (FIM):  1


Distance (FIM):  1=up to 49 ft


Gait Distance Comment:  15'


Gait Level of Assist:  4


Gait Assistive Device:  FWW





PT Plan


Problem List


Problem List:  Activity Tolerance, Functional Strength, Balance, Gait, Transfer





Treatment/Plan


Treatment Plan:  Continue Plan of Care


Treatment Plan:  Bed Mobility, Education, Functional Activity Atif, Functional 

Strength, Gait, Safety, Therapeutic Exercise, Transfers


Treatment Duration:  2018


Frequency:  4 times per week


Estimated Hrs Per Day:  .25 hour per day


Patient and/or Family Agrees t:  Yes





Time/GCodes


Time In:  1405


Time Out:  1420


Total Billed Treatment Time:  15


Total Billed Treatment


1 visit


EVLow 15 min











KAVITA PERDOMO PT Oct 30, 2018 14:31

## 2018-10-30 NOTE — PROGRESS NOTE
Subjective


Date Seen by a Provider:  Oct 30, 2018


Time Seen by a Provider:  08:39


Subjective/Events-last exam


PT REPORTS THAT SHE IS STILL IN QUITE A BIT OF PAIN - THERAPY STAFF AND NURSING 

STAFF IN THE ROOM REPORT THAT SHE COMPLAINS OF PAIN WITH ANY MOVEMENT.


SHE STATES THAT SHE HAS SOME NAUSEA AT TIMES


SHE COMPLAINS OF SOME MILD CONSTIPATION, SHE DENIES CHEST PAIN OR SHORTNESS OF 

BREATH


Review of Systems


General:  No Chills; Fatigue


HEENT:  No Head Aches


Pulmonary:  No Dyspnea, No Cough


Cardiovascular:  No: Chest Pain


Gastrointestinal:  No: Nausea, Abdominal Pain


Genitourinary:  No Dysuria


Musculoskeletal:  back pain


Neurological:  Weakness; No: Confusion





Objective


Exam


Last Set of Vital Signs





Vital Signs








  Date Time  Temp Pulse Resp B/P (MAP) Pulse Ox O2 Delivery O2 Flow Rate FiO2


 


10/30/18 04:06 98.7 93 18 164/77 (106) 93 Nasal Cannula 2.00 





Capillary Refill : Less Than 3 SecondsLess Than 3 Seconds


I&O











Intake and Output 


 


 10/30/18





 00:00


 


Intake Total 375 ml


 


Output Total 1200 ml


 


Balance -825 ml


 


 


 


Intake Oral 375 ml


 


Output Urine Total 1200 ml








General:  Alert, Oriented X3, Cooperative


HEENT:  Atraumatic, PERRLA


Neck:  Supple


Lungs:  Clear to Auscultation


Heart:  Regular Rate


Abdomen:  Normal Bowel Sounds, Soft


Skin:  No Rashes, No Breakdown


Psych/Mental Status:  Mental Status NL, Mood NL





Results


Lab


Laboratory Tests


10/30/18 05:25: 


Sodium Level 139, Potassium Level 3.4L, Chloride Level 100, Carbon Dioxide 

Level 27, Anion Gap 12, Blood Urea Nitrogen 5L, Creatinine 0.58L, Estimat 

Glomerular Filtration Rate > 60, BUN/Creatinine Ratio 9, Glucose Level 103, 

Calcium Level 8.4L





Microbiology


10/28/18 Urine Culture - Final, Complete


           NO GROWTH





Assessment/Plan


Assessment/Plan


Assess & Plan/Chief Complaint


L1 COMPRESSION FRACTURE


PROGRESSIVE WEAKNESS


URINARY TRACT INFECTION


BILATERAL SACRAL FRACTURE





L1 COMPRESSION FRACTURE - CONSULT WAS PLACED TO DR. VILLALOBOS ON PT ADMISSION - 

SHE HAD MRI THIS AFTERNOON WHICH SHOWED BILATERAL PELVIC FX - START ON  

CALCITONIN NASAL SPRAY FOR NOW, MONITOR  SYMPTOMS - WILL CONSIDER TYLMOS ON 

DISCHARGE.


1. Acute to subacute nondisplaced bilateral sacral alar


insufficiency fractures.





PROGRESSIVE WEAKNESS - START PHYSICAL THERAPY.





URINARY TRACT INFECTION - CONTINUE WITH IV ANTIBIOTICS FOR NOW.





DISCUSSED WITH PT - WILL NEED TO CONSIDER PHYSICAL THERAPY AND NURSING HOME 

PLACEMENT.





Clinical Quality Measures


Admission Status


Admission Dx


L1 COMPRESSION FRACTURE


PROGRESSIVE WEAKNESS


URINARY TRACT INFECTION





DVT/VTE Risk/Contraindication:


Risk Factor Score Per Nursin


RFS Level Per Nursing on Admit:  4+=Very High











MOSES CHAIREZ MD Oct 30, 2018 08:39

## 2018-10-30 NOTE — OCC THERAPY PROGRESS NOTE
Therapy Progress Note


Patient adamantly declined OT on this date due to depression and pain from tail 

bone, did not rate.  OT attempted to encourage/educate patient on importance of 

increasing her activity, however, patient became increasingly agitated and 

continued to decline therapy intervention. Pt stated "Oh not today, maybe 

tomorrow I won't hurt as much."  OT will attempt in a.m.


1 ref











RYAN BALLESTEROS Oct 30, 2018 11:21

## 2018-10-30 NOTE — PROGRESS NOTE-STANDARD
Standard Progress Note


Progress Notes/Assess & Plan


Time Seen by a Provider:  00:00


Progress/Assessment & Plan


MRI pelvis reveals sacral insufficiency fracture


Weight bearing as tolerated 


No surgical intervention necessary





Follow up in prn basis


Final Diagnosis


Sacral insufficiency fracture











ROBERTO MORRIS Oct 30, 2018 09:34

## 2018-10-31 VITALS — SYSTOLIC BLOOD PRESSURE: 122 MMHG | DIASTOLIC BLOOD PRESSURE: 68 MMHG

## 2018-10-31 VITALS — SYSTOLIC BLOOD PRESSURE: 121 MMHG | DIASTOLIC BLOOD PRESSURE: 72 MMHG

## 2018-10-31 VITALS — SYSTOLIC BLOOD PRESSURE: 115 MMHG | DIASTOLIC BLOOD PRESSURE: 68 MMHG

## 2018-10-31 VITALS — DIASTOLIC BLOOD PRESSURE: 68 MMHG | SYSTOLIC BLOOD PRESSURE: 122 MMHG

## 2018-10-31 RX ADMIN — FAMOTIDINE SCH MG: 20 TABLET, FILM COATED ORAL at 09:16

## 2018-10-31 RX ADMIN — CLOPIDOGREL BISULFATE SCH MG: 75 TABLET, FILM COATED ORAL at 09:13

## 2018-10-31 RX ADMIN — ENALAPRIL MALEATE SCH MG: 10 TABLET ORAL at 09:16

## 2018-10-31 RX ADMIN — METOPROLOL TARTRATE SCH MG: 25 TABLET, FILM COATED ORAL at 09:13

## 2018-10-31 RX ADMIN — LIDOCAINE SCH EA: 50 PATCH CUTANEOUS at 09:16

## 2018-10-31 RX ADMIN — LEVOTHYROXINE SODIUM SCH MCG: 100 TABLET ORAL at 05:58

## 2018-10-31 RX ADMIN — VERAPAMIL HYDROCHLORIDE SCH MG: 240 TABLET, FILM COATED, EXTENDED RELEASE ORAL at 09:16

## 2018-10-31 RX ADMIN — PANTOPRAZOLE SODIUM SCH MG: 40 TABLET, DELAYED RELEASE ORAL at 05:58

## 2018-10-31 RX ADMIN — Medication SCH EACH: at 05:58

## 2018-10-31 RX ADMIN — TOLTERODINE TARTRATE SCH MG: 2 CAPSULE, EXTENDED RELEASE ORAL at 09:13

## 2018-10-31 RX ADMIN — CALCITONIN SALMON SCH ML: 200 SPRAY, METERED NASAL at 09:20

## 2018-10-31 NOTE — DISCHARGE INST-SKILLED NURSING
Discharge Inst-Skilled NF


Patient Instructions


Patient Problems:  


sacral fx


htn


weakness


falling episodes


copd with oxygen requirement





Consult/Follow Up/Orders


Follow Up Appt.:  


1wk with eugen[facility] - facility to make the appt


Skilled NF Admit to:  [facility]


Certification (SNF)


I certify that SNF services are required to be given on an inpatient basis 

because of the above named patient's need for skilled nursing care on a 

continuing basis for the conditions(s) for which he/she was receiving inpatient 

hospital services prior to his/her transfer to the SNF.


Skilled Nursing Facility Order:  Nursing Services, Occupational Ther-Evaluate & 

Treat, Physical Therapy-Evaluate & Treat


Discharge Diet:  Regular Diet


Daily Activity as Tolerated:  Yes





New & Resume Previous Orders


Moses Hernández 


Oct 31, 2018 


09:39











MOSES HERNÁNDEZ MD Oct 31, 2018 09:40

## 2018-10-31 NOTE — PHYSICAL THERAPY DAILY NOTE
PT Daily Note-Current


Subjective


Pt. in bed on side, hesitant to agree to PT but does with encouragement. 

Educated pt. that lack of activity and gait put her at risk for blood clots and 

pneumonia.  C/o pain in her mid and low back at 9/10 tanisha with gait and stance





Pain





   Numeric Pain Scale:  9


   Location:  Medial


   Location Body Site:  Back


   Pain Description:  Stabbing





Mental Status


Patient Orientation:  Person, Place, Time, Situation


Attachments:  Oxygen (2L)





Transfers


              Functional [place] Measure


0=Not Assessed/NA   4=Minimal Assistance


1=Total Assistance   5=Supervision or Setup


2=Maximal Assistance   6=Modified [place]


3=Moderate Assistance   7=Complete IndependenceIRFPAI Quality Coding Scale











6 Independent with activity with or without an assistive device


 


5  Patient requires set up or clean up by helper.  Patient completes activity  

by  themselves


 


4 Supervision or touching assist (CGA). [place] provide cues , steadying assist


 


3 The helper provides less than half the effort to complete the activity


 


2 The helper provides more than half the effort to complete the activity


 


1 Dependent.  The helper does all the effort to complete an activity 


 


7 Patient refused to complete or attempt activity


 


9 The patient did not perform the activity before the current illness or injury


 


88 Not attempted due to Medical conditions or safety concerns








Transfers (B, C, W/C) (FIM):  4


Scootin


Rollin


Supine to/from Sit:  4


Sit to/from Stand:  4


Bed to/from Chair:  4





Weight Bearing


Right Lower Extremity:  Right


Full Weight Bearing


Left Lower Extremity:  Left


Full Weight Bearing





Gait Training


Gait (FIM):  2


Distance (FIM):  0=010-68 ft (80 ft)


Gait Level of Assist:  4


Gait Persons Needed:  1


Gait Assistive Device:  FWW


assist for O2, , education for turning and manuevering FWW





Treatments


slow moving, Dr visits during Rx to explain course of action to NH placement 

and SW intervention for payment etc





Assessment


Current Status:  Fair Progress


all moving and activity causes increased pain with pt c/o





PT Short Term Goals


Short Term Goals


Time Frame:  2018


Transfers (B,C,W/C) (FIM):  5


Gait (FIM):  1


Distance (FIM):  1=up to 49 ft


Gait Distance Comment:  15'


Gait Level of Assist:  4


Gait Assistive Device:  FWW





PT Plan


Treatment/Plan


Treatment Plan:  Continue Plan of Care


Treatment Plan:  Bed Mobility, Education, Functional Activity Atif, Functional 

Strength, Gait, Safety, Therapeutic Exercise, Transfers


Treatment Duration:  2018


Frequency:  4 times per week


Estimated Hrs Per Day:  .25 hour per day


Patient and/or Family Agrees t:  Yes





Safety Risks/Education


Patient Education:  Gait Training, Transfer Techniques, Correct Positioning, 

Disease Process, Safety Issues


Teaching Recipient:  Patient


Teaching Methods:  Demonstration, Discussion


Response to Teaching:  Verbalize Understanding, Return Demonstration, 

Reinforcement Needed


educated re: risks of no activity





Time/GCodes


Time In:  855


Time Out:  925


Total Billed Treatment Time:  30


Total Billed Treatment


1,FA30m


G Codes Necessary:  KATHRYN Blankenship PTA Oct 31, 2018 09:30

## 2018-10-31 NOTE — DISCHARGE SUMMARY
Diagnosis/Chief Complaint


Date of Admission


Oct 28, 2018 at 14:30


Date of Discharge





Discharge Date:  Oct 31, 2018


Discharge Time:  09:45


Admission Diagnosis


Admission Diagnosis


L1 COMPRESSION FRACTURE


PROGRESSIVE WEAKNESS


URINARY TRACT INFECTION





Discharge Diagnosis


L1 COMPRESSION FRACTURE


PROGRESSIVE WEAKNESS


URINARY TRACT INFECTION


BILATERAL SACRAL FRACTURE





Reason Hospital Visit


PT IS AN 86 Y/O FEMALE WHO IS KNOWN TO ME FROM CLINIC.


SHE HAD FALLEN A FEW WEEKS AGO, HAD PROGRESSIVE BACK PAIN - HAD AN IMAGING 

STUDY WHICH SHOWED LUMBAR COMPRESSION FRACTURE - SHE WAS TOLD SHE MAY BE A 

CANDIDATE FOR KYPHOPLASTY, HOWEVER SHE HAS YET TO BE SEEN BY SPECIALIST PRIOR 

TO HER PAIN PROGRESSING AND PRESENTING TO THE EMERGENCY DEPARTMENT.


SHE WAS FOUND TO HAVE A URINARY TRACT INFECTION AS WELL AND WAS THUS ADMITTED 

TO THE HOSPITAL FOR TREATMENT OF UTI AND CONSULT TO ORTHOPEDIC SURGEON FOR 

POSSIBLE KYPHOPLASTY.





Discharge Summary


Consultations


DR. VILLALOBOS


Discharge Physical Examination


Allergies:  


Coded Allergies:  


     prednisone (Verified  Allergy, Unknown, 10/28/18)


     codeine (Verified  Adverse Reaction, Mild, N/V, SWEATS, 10/28/18)


Uncoded Allergies:  


     NARCOTICS (Adverse Reaction, Intermediate, 13)


Vitals & I&Os





Vital Signs








  Date Time  Temp Pulse Resp B/P (MAP) Pulse Ox O2 Delivery O2 Flow Rate FiO2


 


10/31/18 13:13  72 18 122/68 94 Nasal Cannula  


 


10/31/18 08:00 98.0      2.00 








General Appearance:  Alert, Oriented X3, Cooperative


HEENT:  Atraumatic, PERRLA


Respiratory:  Clear to Auscultation


Cardiovascular:  Regular Rate


Abdominal:  Normal Bowel Sounds, Soft


Extremities:  No Clubbing, No Cyanosis


Skin:  No Rashes, No Breakdown


Neuro:  Cranial Nerves 3-12 NL


Psych/Mental Status:  Mental Status NL, Mood NL





Hospital Course


L1 COMPRESSION FRACTURE


PROGRESSIVE WEAKNESS


URINARY TRACT INFECTION


BILATERAL SACRAL FRACTURE





L1 COMPRESSION FRACTURE - CONSULT WAS PLACED TO DR. VILLALOBOS ON PT ADMISSION - 

SHE HAD MRI WHICH SHOWED BILATERAL PELVIC FX - START ON  CALCITONIN NASAL SPRAY 

FOR NOW, MONITOR  SYMPTOMS - WILL CONSIDER TYLMOS ON DISCHARGE.


1. Acute to subacute nondisplaced bilateral sacral alar


insufficiency fractures.





PROGRESSIVE WEAKNESS - STARTED PHYSICAL THERAPY.





URINARY TRACT INFECTION -CULTURE NEGATIVE - NO MEDICATION ON DISCHARGE





DISCUSSED WITH PT AND HER SON - WILL NEED TO PLACE AT [facility] FOR  

PHYSICAL THERAPY AND NURSING MANAGEMENT OF PAIN





Discharge


Condition at discharge


STABLE, SLIGHTLY IMPROVED


Instructions to patient/family


Please see electronic discharge instructions given to patient.


Discharge Medications


Reviewed and agree with Discharge Medication list on patient's Discharge 

Instruction sheet





Clinical Quality Measures


DVT/VTE Risk/Contraindication:


Risk Factor Score Per Nursin


RFS Level Per Nursing on Admit:  4+=Very High











MOSES CHAIREZ MD Oct 31, 2018 09:34

## 2018-10-31 NOTE — OCCUPATIONAL THER DAILY NOTE
OT Current Status-Daily Note


Subjective


Pt alert, sitting EOB.  Pt had put wig on and stated that she wanted a little 

bit of dignity.  Pt c/o pain but did agree to work with therapy.  Nrsg notified 

of pain.  Pt had pain patch on lower back area.





Mental Status/Objective


Patient Orientation:  Person


              Functional [place] Measure


0=Not Assessed/NA   4=Minimal Assistance


1=Total Assistance   5=Supervision or Setup


2=Maximal Assistance   6=Modified [place]


3=Moderate Assistance   7=Complete [place]





ADL-Treatment


Pt thought she was in [place].  Pt agrees to complete sponge bath and dress 

since she is going to [facility], per pt.  Nrsg reported that pt still needs to get 

insurance approval.  With higher surface, pt able to sit to stand with CGA.  

Min A and assist to manipulate FWW with ambulating to [facility] [place] thought that the 

FWW had brakes and that was why it is hard to use.  Pt in pain throughout.  Pt 

was able to cleanse chloe area after voiding and hike pants over hips with CGA 

for stabilization.  Assist needed to thread LE's through pant legs.  Assist 

needed to thread UE's through sleeves and pull over head.  Pt called son to 

find out where he was and if he knew about going to [facility].  Nrsg reported that 

son had been called already.  After therapy, pt sitting in recliner with feet 

elevated.  Call light/phone in reach, all needs met in room.


Upper Body (FIM):  2


Lower Body Dressing (FIM):  2


Toileting (FIM):  4


Transfers (B, C, W/C) (FIM):  3


Toilet/Commode Transfer (FIM):  3 (Mod A, pt pushes FWW away then takes ahold 

of [facility] [place] arm rests for support.)





OT Short Term Goals


Short Term Goals


Transfers (B,C,W/C) (FIM):  5


1=Demonstrate adherence to instructed precautions during ADL tasks.


2=Patient will verbalize/demonstrate understanding of assistive devices/

modifications for ADL.


3=Patient will improve strength/tolerance for activity to enable patient to 

perform ADL's.





OT Long Term Goals


Long Term Goals


Time Frame:  Nov 5, 2018


Eating (FIM):  6


Grooming(FIM):  6


Bathing(FIM):  5


Upper Body Dressing(FIM):  5


Lower Body Dressing(FIM):  5


Toileting(FIM):  5


Toilet/Commode Transfer(FIM):  5


Shower Transfer(FIM):  5


Additional Goals:  1-Demonstrate ADL Tasks, 2-Verbalize Understanding, 3-

ImproveStrength/Atif


1=Demonstrate adherence to instructed precautions during ADL tasks.


2=Patient will verbalize/demonstrate understanding of assistive devices/

modifications for ADL.


3=Patient will improve strength/tolerance for activity to enable patient to 

perform ADL's.





OT Education/Plan


Problem List/Assessment


Pt would benefit from skilled OT to increase her independence in basic self 

care to allow her to safely return home





Discharge Recommendations


Plan/Recommendations:  Continue POC





Treatment Plan/Plan of Care


Patient would benefit from OT for education, treatment and training to promote 

independence in ADL's, mobility, safety and/or upper extremity function for ADL'

s.


Plan of Care:  ADL Retraining, Functional Mobility, UE Funct Exercise/Act, UE 

Neuromus Re-Ed/Coord


Treatment Duration:  Nov 5, 2018


Frequency:  5 times per week


Estimated Hrs Per Day:  .5 hour per day


Agreement:  Yes


Rehab Potential:  Fair





Time/GCodes


Start Time:  10:45


Stop Time:  11:10


Total Time Billed (hr/min):  25


Billed Treatment Time


1 visit-ADL 2 (25 min)











RYAN BALLESTEROS Oct 31, 2018 11:19

## 2019-01-05 ENCOUNTER — HOSPITAL ENCOUNTER (EMERGENCY)
Dept: HOSPITAL 75 - ER | Age: 84
Discharge: NURSING, REHAB OR LONG-TERM CARE | End: 2019-01-05
Payer: MEDICARE

## 2019-01-07 ENCOUNTER — HOSPITAL ENCOUNTER (EMERGENCY)
Dept: HOSPITAL 75 - ER | Age: 84
Discharge: HOME | End: 2019-01-07
Payer: MEDICARE

## 2019-01-07 VITALS — HEIGHT: 62 IN | BODY MASS INDEX: 17.48 KG/M2 | WEIGHT: 95 LBS

## 2019-01-07 VITALS — SYSTOLIC BLOOD PRESSURE: 76 MMHG | DIASTOLIC BLOOD PRESSURE: 50 MMHG

## 2019-01-07 DIAGNOSIS — Z88.8: ICD-10-CM

## 2019-01-07 DIAGNOSIS — N39.0: ICD-10-CM

## 2019-01-07 DIAGNOSIS — Z79.02: ICD-10-CM

## 2019-01-07 DIAGNOSIS — Z98.890: ICD-10-CM

## 2019-01-07 DIAGNOSIS — J44.9: ICD-10-CM

## 2019-01-07 DIAGNOSIS — I25.2: ICD-10-CM

## 2019-01-07 DIAGNOSIS — Z90.89: ICD-10-CM

## 2019-01-07 DIAGNOSIS — Z79.51: ICD-10-CM

## 2019-01-07 DIAGNOSIS — I10: ICD-10-CM

## 2019-01-07 DIAGNOSIS — Z95.5: ICD-10-CM

## 2019-01-07 DIAGNOSIS — E78.00: ICD-10-CM

## 2019-01-07 DIAGNOSIS — Z87.891: ICD-10-CM

## 2019-01-07 DIAGNOSIS — I25.10: ICD-10-CM

## 2019-01-07 DIAGNOSIS — Z87.01: ICD-10-CM

## 2019-01-07 DIAGNOSIS — E03.9: ICD-10-CM

## 2019-01-07 DIAGNOSIS — Z80.1: ICD-10-CM

## 2019-01-07 DIAGNOSIS — F03.90: ICD-10-CM

## 2019-01-07 DIAGNOSIS — Z87.440: ICD-10-CM

## 2019-01-07 DIAGNOSIS — M81.0: ICD-10-CM

## 2019-01-07 DIAGNOSIS — R06.89: ICD-10-CM

## 2019-01-07 DIAGNOSIS — Z87.448: ICD-10-CM

## 2019-01-07 DIAGNOSIS — Z88.5: ICD-10-CM

## 2019-01-07 DIAGNOSIS — Z87.19: ICD-10-CM

## 2019-01-07 DIAGNOSIS — J96.91: Primary | ICD-10-CM

## 2019-01-07 LAB
ALBUMIN SERPL-MCNC: 3.1 GM/DL (ref 3.2–4.5)
ALP SERPL-CCNC: 115 U/L (ref 40–136)
ALT SERPL-CCNC: 10 U/L (ref 0–55)
APTT BLD: 39 SEC (ref 24–35)
APTT PPP: YELLOW S
ARTERIAL PATENCY WRIST A: (no result)
BACTERIA #/AREA URNS HPF: NEGATIVE /HPF
BASE EXCESS STD BLDA CALC-SCNC: 2.1 MMOL/L (ref -2.5–2.5)
BASOPHILS # BLD AUTO: 0 10^3/UL (ref 0–0.1)
BASOPHILS NFR BLD AUTO: 0 % (ref 0–10)
BDY SITE: (no result)
BILIRUB SERPL-MCNC: 0.7 MG/DL (ref 0.1–1)
BILIRUB UR QL STRIP: NEGATIVE
BODY TEMPERATURE: 99.1
BUN/CREAT SERPL: 15
CALCIUM SERPL-MCNC: 8.4 MG/DL (ref 8.5–10.1)
CHLORIDE SERPL-SCNC: 101 MMOL/L (ref 98–107)
CO2 BLDA CALC-SCNC: 30.8 MMOL/L (ref 21–31)
CO2 SERPL-SCNC: 27 MMOL/L (ref 21–32)
CREAT SERPL-MCNC: 2.42 MG/DL (ref 0.6–1.3)
EOSINOPHIL # BLD AUTO: 0.1 10^3/UL (ref 0–0.3)
EOSINOPHIL NFR BLD AUTO: 1 % (ref 0–10)
ERYTHROCYTE [DISTWIDTH] IN BLOOD BY AUTOMATED COUNT: 14.6 % (ref 10–14.5)
FIBRINOGEN PPP-MCNC: CLEAR MG/DL
GFR SERPLBLD BASED ON 1.73 SQ M-ARVRAT: 19 ML/MIN
GLUCOSE SERPL-MCNC: 90 MG/DL (ref 70–105)
GLUCOSE UR STRIP-MCNC: NEGATIVE MG/DL
HCT VFR BLD CALC: 36 % (ref 35–52)
HGB BLD-MCNC: 10.9 G/DL (ref 11.5–16)
INHALED O2 FLOW RATE: 5 L/MIN
INR PPP: 1.1 (ref 0.8–1.4)
KETONES UR QL STRIP: (no result)
LEUKOCYTE ESTERASE UR QL STRIP: (no result)
LYMPHOCYTES # BLD AUTO: 2 X 10^3 (ref 1–4)
LYMPHOCYTES NFR BLD AUTO: 17 % (ref 12–44)
MANUAL DIFFERENTIAL PERFORMED BLD QL: NO
MCH RBC QN AUTO: 32 PG (ref 25–34)
MCHC RBC AUTO-ENTMCNC: 30 G/DL (ref 32–36)
MCV RBC AUTO: 104 FL (ref 80–99)
MONOCYTES # BLD AUTO: 1.2 X 10^3 (ref 0–1)
MONOCYTES NFR BLD AUTO: 10 % (ref 0–12)
NEUTROPHILS # BLD AUTO: 8.8 X 10^3 (ref 1.8–7.8)
NEUTROPHILS NFR BLD AUTO: 72 % (ref 42–75)
NITRITE UR QL STRIP: POSITIVE
PCO2 BLDA: 69 MMHG (ref 35–45)
PH BLDA: 7.24 [PH] (ref 7.37–7.43)
PH UR STRIP: 5 [PH] (ref 5–9)
PLATELET # BLD: 154 10^3/UL (ref 130–400)
PMV BLD AUTO: 12.8 FL (ref 7.4–10.4)
PO2 BLDA: 68 MMHG (ref 79–93)
POTASSIUM SERPL-SCNC: 4.3 MMOL/L (ref 3.6–5)
PROT SERPL-MCNC: 6.3 GM/DL (ref 6.4–8.2)
PROT UR QL STRIP: (no result)
PROTHROMBIN TIME: 14.4 SEC (ref 12.2–14.7)
RBC # BLD AUTO: 3.46 10^6/UL (ref 4.35–5.85)
RBC #/AREA URNS HPF: (no result) /HPF
SAO2 % BLDA FROM PO2: 91 % (ref 94–100)
SODIUM SERPL-SCNC: 140 MMOL/L (ref 135–145)
SP GR UR STRIP: 1.02 (ref 1.02–1.02)
UROBILINOGEN UR-MCNC: 1 MG/DL
WBC # BLD AUTO: 12.2 10^3/UL (ref 4.3–11)
WBC #/AREA URNS HPF: >100 /HPF

## 2019-01-07 PROCEDURE — 96365 THER/PROPH/DIAG IV INF INIT: CPT

## 2019-01-07 PROCEDURE — 93005 ELECTROCARDIOGRAM TRACING: CPT

## 2019-01-07 PROCEDURE — 87040 BLOOD CULTURE FOR BACTERIA: CPT

## 2019-01-07 PROCEDURE — 36415 COLL VENOUS BLD VENIPUNCTURE: CPT

## 2019-01-07 PROCEDURE — 87088 URINE BACTERIA CULTURE: CPT

## 2019-01-07 PROCEDURE — 83605 ASSAY OF LACTIC ACID: CPT

## 2019-01-07 PROCEDURE — 94640 AIRWAY INHALATION TREATMENT: CPT

## 2019-01-07 PROCEDURE — 36600 WITHDRAWAL OF ARTERIAL BLOOD: CPT

## 2019-01-07 PROCEDURE — 82805 BLOOD GASES W/O2 SATURATION: CPT

## 2019-01-07 PROCEDURE — 85025 COMPLETE CBC W/AUTO DIFF WBC: CPT

## 2019-01-07 PROCEDURE — 85730 THROMBOPLASTIN TIME PARTIAL: CPT

## 2019-01-07 PROCEDURE — 85610 PROTHROMBIN TIME: CPT

## 2019-01-07 PROCEDURE — 81000 URINALYSIS NONAUTO W/SCOPE: CPT

## 2019-01-07 PROCEDURE — 80053 COMPREHEN METABOLIC PANEL: CPT

## 2019-01-07 PROCEDURE — 96361 HYDRATE IV INFUSION ADD-ON: CPT

## 2019-01-07 PROCEDURE — 84484 ASSAY OF TROPONIN QUANT: CPT

## 2019-01-07 NOTE — ED DYSPNEA
General


Stated Complaint:  UNRESPONSIVE


Source of Information:  Patient, EMS, Family, Nursing Home Records


Exam Limitations:  Other (clinical condition)





History of Present Illness


Date Seen by Provider:  Jan 7, 2019


Time Seen by Provider:  11:08


Initial Comments


The patient presents to the ER by EMS with chief complaint that nursing staff 

found her to be unresponsive today and with low oxygen sats in the 70%. EMS put 

a nonrebreather on her at 15 L/m and her oxygen sats went up to 98%. At 

baseline she is alert. Staff reports they had to take the medicines out of her 

mouth to try to give her this morning because she was not swallowing. She has a 

history of cognitive communication deficit, dysphagia and history of MI, 

dementia and COPD. EMS also reported there was a recent fall, patient had a 

lumbar vertebral compression fraction. She has tramadol for pain.





Allergies and Home Medications


Allergies


Coded Allergies:  


     prednisone (Verified  Allergy, Unknown, 10/28/18)


     codeine (Verified  Adverse Reaction, Mild, N/V, SWEATS, 10/28/18)


Uncoded Allergies:  


     NARCOTICS (Adverse Reaction, Intermediate, 2/1/13)





Home Medications


Acetaminophen 325 Mg Tablet, 650 MG PO Q6H PRN for PAIN-MILD


   Prescribed by: MOSES CHAIREZ on 10/31/18 0938


Albuterol Sulfate 18 Gm Hfa.aer.ad, 2 PUFF INH Q4H PRN for SHORTNESS OF BREATH, 

(Reported)


Atorvastatin Calcium 10 Mg Tablet, 5 MG PO HS, (Reported)


   TAKES 1/2 (10MG) TABLET 


Clopidogrel Bisulfate 75 Mg Tablet, 75 MG PO DAILY, (Reported)


Enalapril Maleate 10 Mg Tablet, 10 MG PO DAILY, (Reported)


L.acidoph & Paracasei,B.lactis 1 Each Capsule, 1 CAP PO BID, (Reported)


Levothyroxine Sodium 100 Mcg Tablet, 100 MCG PO DAILY, (Reported)


Lidocaine 1 Each Adh..patch, 1 EACH TP Q12H


   salonpas 4% lidocaine patch - apply two patches across sacrum - on AM off HS 


   Prescribed by: MOSES CHAIREZ on 10/31/18 0938


Meclizine HCl 25 Mg Tablet, 25 MG PO BID PRN for VERTIGO, (Reported)


Metoprolol Tartrate 25 Mg Tablet, 25 MG PO BID, (Reported)


Montelukast Sodium 10 Mg Tablet, 10 MG PO HS, (Reported)


Ondansetron 4 Mg Tab.rapdis, 4 MG PO TID PRN for NAUSEA/VOMITING-1ST LINE, (

Reported)


   PAIN MEDICATIONS CAUSE NAUSEA 


Ondansetron HCl 4 Mg Tab, 4 MG PO Q4H


   Prescribed by: JAMISON GATICA on 1/5/19 2032


Pantoprazole Sodium 40 Mg Tablet.dr, 40 MG PO DAILY, (Reported)


Tolterodine Tartrate 1 Mg Tablet, 1 MG PO BID, (Reported)


Tramadol HCl 50 Mg Tablet, 50 MG PO TID


   Prescribed by: MOSES CHAIREZ on 10/31/18 0938


Tramadol HCl 50 Mg Tablet, 50 MG PO Q4H


   Prescribed by: JAMISON GATICA on 1/5/19 2002


Trazodone HCl 50 Mg Tablet, 50 MG PO HS, (Reported)


Verapamil HCl 240 Mg Tablet.er, 240 MG PO DAILY, (Reported)


Vit A/C/E/Zinc/Co 1 Cap Capsule, 1 CAP PO BID, (Reported)


[Calcitonin] 3.7 ML SOLN, 0 ML NA DAILY


   Prescribed by: MOSES CHAIREZ on 10/31/18 0938





Patient Home Medication List


Home Medication List Reviewed:  Yes





Review of Systems


Review of Systems


Constitutional:  see HPI (patient is not able to give any review meaningful 

review of systems secondary to clinical condition.)





Past Medical-Social-Family Hx


Patient Social History


Alcohol Beverage of Choice:  Wine


Recreational Drug Use:  No


Smoking Status:  Former Smoker


Type Used:  Cigarettes


Former Smoker, Quit:  Jan 1, 2000


2nd Hand Smoke Exposure:  No


Recent Hopitalizations:  No





Immunizations Up To Date


Tetanus Booster (TDap):  Unknown


Date of Pneumonia Vaccine:  Oct 1, 2009


Date of Influenza Vaccine:  Oct 1, 2018





Seasonal Allergies


Seasonal Allergies:  No





Past Medical History


Surgeries:  Yes


Cardiac, Coronary Stent, Eye Surgery, Gallbladder, Orthopedic, Tonsillectomy


Respiratory:  Yes (ON VENT POST OP AFTER HIF FX)


Pneumonia, COPD, Emphysema


Currently Using CPAP:  No


Currently Using BIPAP:  No


Cardiac:  Yes (CARDIAC STENT X1; NSTEMI)


Coronary Artery Disease, Heart Attack, High Cholesterol, Hypertension, Valvular 

Heart Disease


Neurological:  Yes (chronic dizziness)


Dementia, Vertigo


Reproductive Disorders:  No


Female Reproductive Disorders:  Denies


GYN History:  Menopausal


Sexually Transmitted Disease:  No


HIV/AIDS:  No


Genitourinary:  Yes


Kidney Infection, UTI-Chronic


Gastrointestinal:  Yes (CHRONIC NAUSEA)


Colitis, Gastroesophageal Reflux


Musculoskeletal:  Yes (COMPRESSION FX'S; LEFT HIP FX/ORIF-PINNING. FALLS/

AMBULATES WITH WALKER)


Osteoporosis, Arthritis, Fractures


Endocrine:  Yes


Hypothyroidsim


HEENT:  Yes (CATARACT SURGERY X 3)


Cataract


Cancer:  No


Psychosocial:  No


Integumentary:  No


Blood Disorders:  No





Family Medical History





Neoplasm (LUNG CANCER)


  G8 SISTER


Cancer, Hypertension





Physical Exam


Vital Signs





Vital Signs - First Documented








 1/7/19 1/7/19 1/7/19





 11:02 11:29 11:50


 


Temp 97.8  


 


Pulse 80  


 


Resp 9  


 


B/P (MAP) 74/50 (58)  


 


Pulse Ox 83  


 


O2 Delivery Room Air  


 


O2 Flow Rate  5.00 


 


FiO2   35





Capillary Refill :


Height, Weight, BMI


Height: 5'3.00"


Weight: 110lbs. 0oz. [id number]; 18.8 BMI


Method:Estimated


General Appearance:  Cachetic, Moderate Distress


HEENT:  PERRL/EOMI, TMs Normal; No Moist Mucous Membranes


Neck:  Full Range of Motion, Normal Inspection


Respiratory:  Chest Non Tender, No Accessory Muscle Use, Decreased Breath Sounds

, Respiratory Distress (mild-to-moderate)


Cardiovascular:  Regular Rate, Rhythm, No Edema, Normal Peripheral Pulses


Peripheral Pulses:  2+ Radial Pulses (R), 2+ Radial Pulses (L)


Gastrointestinal:  Normal Bowel Sounds, Non Tender, Soft


Neurologic/Psychiatric:  No Motor/Sensory Deficits, Other (GCS 10 points)


Skin:  Normal Color, Warm/Dry





Focused Exam


Lactate Level


1/7/19 11:37: Lactic Acid Level 1.63





Lactic Acid Level





Laboratory Tests








Test


 1/7/19


11:37


 


Lactic Acid Level


 1.63 MMOL/L


(0.50-2.00)











Progress/Results/Core Measures


Results/Orders


Lab Results





Laboratory Tests








Test


 1/7/19


11:10 1/7/19


11:25 1/7/19


11:37 1/7/19


12:10 Range/Units


 


 


White Blood Count


 12.2 H


 


 


 


 4.3-11.0


10^3/uL


 


Red Blood Count


 3.46 L


 


 


 


 4.35-5.85


10^6/uL


 


Hemoglobin 10.9 L    11.5-16.0  G/DL


 


Hematocrit 36     35-52  %


 


Mean Corpuscular Volume 104 H    80-99  FL


 


Mean Corpuscular Hemoglobin 32     25-34  PG


 


Mean Corpuscular Hemoglobin


Concent 30 L


 


 


 


 32-36  G/DL





 


Red Cell Distribution Width 14.6 H    10.0-14.5  %


 


Platelet Count


 154 


 


 


 


 130-400


10^3/uL


 


Mean Platelet Volume 12.8 H    7.4-10.4  FL


 


Neutrophils (%) (Auto) 72     42-75  %


 


Lymphocytes (%) (Auto) 17     12-44  %


 


Monocytes (%) (Auto) 10     0-12  %


 


Eosinophils (%) (Auto) 1     0-10  %


 


Basophils (%) (Auto) 0     0-10  %


 


Neutrophils # (Auto) 8.8 H    1.8-7.8  X 10^3


 


Lymphocytes # (Auto) 2.0     1.0-4.0  X 10^3


 


Monocytes # (Auto) 1.2 H    0.0-1.0  X 10^3


 


Eosinophils # (Auto)


 0.1 


 


 


 


 0.0-0.3


10^3/uL


 


Basophils # (Auto)


 0.0 


 


 


 


 0.0-0.1


10^3/uL


 


Prothrombin Time 14.4     12.2-14.7  SEC


 


INR Comment 1.1     0.8-1.4  


 


Activated Partial


Thromboplast Time 39 H


 


 


 


 24-35  SEC





 


Sodium Level 140     135-145  MMOL/L


 


Potassium Level 4.3     3.6-5.0  MMOL/L


 


Chloride Level 101       MMOL/L


 


Carbon Dioxide Level 27     21-32  MMOL/L


 


Anion Gap 12     5-14  MMOL/L


 


Blood Urea Nitrogen 36 H    7-18  MG/DL


 


Creatinine


 2.42 H


 


 


 


 0.60-1.30


MG/DL


 


Estimat Glomerular Filtration


Rate 19 


 


 


 


  





 


BUN/Creatinine Ratio 15      


 


Glucose Level 90       MG/DL


 


Calcium Level 8.4 L    8.5-10.1  MG/DL


 


Corrected Calcium 9.1     8.5-10.1  MG/DL


 


Total Bilirubin 0.7     0.1-1.0  MG/DL


 


Aspartate Amino Transf


(AST/SGOT) 18 


 


 


 


 5-34  U/L





 


Alanine Aminotransferase


(ALT/SGPT) 10 


 


 


 


 0-55  U/L





 


Alkaline Phosphatase 115       U/L


 


Troponin I 0.073     <0.028  NG/ML


 


Total Protein 6.3 L    6.4-8.2  GM/DL


 


Albumin 3.1 L    3.2-4.5  GM/DL


 


Blood Gas Puncture Site  RR     


 


Blood Gas Patient Temperature  99.1     


 


Arterial Blood pH  7.24 *L   7.37-7.43  


 


Arterial Blood Partial


Pressure CO2 


 69 H


 


 


 35-45  MMHG





 


Arterial Blood Partial


Pressure O2 


 68 L


 


 


 79-93  MMHG





 


Arterial Blood HCO3  29 H   23-27  MMOL/L


 


Arterial Blood Total CO2


 


 30.8 


 


 


 21.0-31.0


MMOL/L


 


Arterial Blood Oxygen


Saturation 


 91 L


 


 


   %





 


Arterial Blood Base Excess


 


 2.1 


 


 


 -2.5-2.5


MMOL/L


 


Avtar Test  YES-POS     


 


Blood Gas Ventilator Setting  NA     


 


Blood Gas Inspired Oxygen  5     


 


Lactic Acid Level


 


 


 1.63 


 


 0.50-2.00


MMOL/L


 


Urine Color    YELLOW   


 


Urine Clarity    CLEAR   


 


Urine pH    5  5-9  


 


Urine Specific Gravity    1.025 H 1.016-1.022  


 


Urine Protein    2+ H NEGATIVE  


 


Urine Glucose (UA)    NEGATIVE  NEGATIVE  


 


Urine Ketones    1+ H NEGATIVE  


 


Urine Nitrite    POSITIVE H NEGATIVE  


 


Urine Bilirubin    NEGATIVE  NEGATIVE  


 


Urine Urobilinogen    1  NORMAL  MG/DL


 


Urine Leukocyte Esterase    3+ H NEGATIVE  


 


Urine RBC (Auto)    2+ H NEGATIVE  


 


Urine RBC    5-10 H  /HPF


 


Urine WBC    >100 H  /HPF


 


Urine Squamous Epithelial


Cells 


 


 


 10-25 H


  /HPF





 


Urine Crystals    NONE   /LPF


 


Urine Bacteria    NEGATIVE   /HPF


 


Urine Casts    NONE   /LPF


 


Urine Mucus    NEGATIVE   /LPF


 


Urine Culture Indicated    NO   








My Orders





Orders - ELEAZAR SALAZAR


Cbc With Automated Diff (1/7/19 11:12)


Comprehensive Metabolic Panel (1/7/19 11:12)


Blood Culture (1/7/19 11:12)


Sputum Culture (1/7/19 11:12)


Urinalysis (1/7/19 11:12)


Urine Culture (1/7/19 11:12)


Protime With Inr (1/7/19 11:12)


Partial Thromboplastin Time (1/7/19 11:12)


Saline Lock/Iv-Start (1/7/19 11:12)


Saline Lock/Iv-Start (1/7/19 11:12)


Ekg Tracing (1/7/19 11:12)


Troponin I (1/7/19 11:12)


Vital Signs Adult Sepsis Patie Q15M (1/7/19 11:12)


O2 (1/7/19 11:12)


Remove Rings In Anticipation O (1/7/19 11:12)


Lactic Acid Analyzer (1/7/19 11:12)


Ns Iv 1000 Ml (Sodium Chloride 0.9%) (1/7/19 11:12)


Cefepime Injection (Maxipime Injection) (1/7/19 11:15)


Albuterol/Ipra Inhalation Soln (Duoneb I (1/7/19 11:15)


Svn Small Volume Nebulizer (1/7/19 11:12)


Saline Lock/Iv-Start (1/7/19 11:26)


Ns Iv 500 Ml (Sodium Chloride 0.9%) (1/7/19 11:26)


Arterial Blood Gas (1/7/19 11:26)


Arterial Blood Draw (1/7/19 11:25)


Catheter(Urinary) Insert & Ass 03,15 (1/7/19 11:47)





Medications Given in ED





Current Medications








 Medications  Dose


 Ordered  Sig/Greg


 Route  Start Time


 Stop Time Status Last Admin


Dose Admin


 


 Albuterol/


 Ipratropium  3 ml  ONCE  ONCE


 INH  1/7/19 11:15


 1/7/19 11:16 DC 1/7/19 11:28


3 ML


 


 Cefepime HCl 1000


 mg/Sodium Chloride  50 ml @ 


 100 mls/hr  ONCE  ONCE


 IV  1/7/19 11:15


 1/7/19 11:44 DC 1/7/19 12:25


100 MLS/HR


 


 Sodium Chloride  500 ml @ 0


 mls/hr  Q0M ONCE


 IV  1/7/19 11:26


 1/7/19 11:29 DC 1/7/19 12:26


500 MLS/HR








Vital Signs/I&O











 1/7/19 1/7/19 1/7/19





 11:02 11:29 11:50


 


Temp 97.8  


 


Pulse 80  


 


Resp 9  


 


B/P (MAP) 74/50 (58)  


 


Pulse Ox 83 99 


 


O2 Delivery Room Air Nasal Cannula Vapotherm


 


O2 Flow Rate  5.00 15.00


 


FiO2   35











Progress


Progress Note #1:  


   Time:  11:25


Progress Note


The patient was mouth breathing so we put a nasal cannula in her mouth that at 

6 L she is staying right at 100 %. Her blood pressure is low so fluid boluses. 

Suspect she could have a pulmonary infection and she does have some rattly 

sounding lung sounds. No wheezing. We'll get an ABG to see if she has an 

elevated CO2. We'll get labs, blood cultures urine and straight catheter. EKG 

and troponin.


2 days ago the patient was brought into the ER for left superior and inferior 

rami hip fracture. She was sent back to the nursing home for outpatient follow-

up. Her left gamma nails were stable. She is is using the tramadol and she's 

been on it for the last couple months since her fall and back fracture. I 

suspect this is less likely to be due to her tramadol and she does not use 

opiates.


Her ABG came back with respiratory acidosis. We'll put her on Vapotherm.


Progress Note #2:  


   Time:  13:16


Progress Note


Had some lengthy discussions with the family and they feel that is their mother'

s wishes not to go through her old this and would prefer comfort cares. We've 

contacted palliative care as well as  to come by and talked patient and 

family. Palliative care is presently with the family.


Progress Note #3:  


   Time:  13:48


Progress Note


After a palliative care consult in the family has elected. Hospice and wants to 

go back to via [facility] with her. Palliative care will contact the 

hospice.


Initial ECG Impression Date:  Jan 7, 2019


Initial ECG Impression Time:  12:02


Initial ECG Rate:  112


Initial ECG Rhythm:  S.Tach


Initial ECG Intervals:  QT (505)


Initial ECG Impression:  Nonspecific Changes


Comment


Lots of artifact, PVC and no ST T-segment elevation or depression.





Diagnostic Imaging





   Diagonstic Imaging:  Xray


   Plain Films/CT/US/NM/MRI:  chest (1v)


   Reviewed:  Reviewed by Me





Departure


Impression





 Primary Impression:  


 RESPIRATORY FAILURE, UNSP, UNSP W HYPOXIA OR HYPERCAPNIA


 Additional Impression:  


 Urinary tract infection


 Qualified Codes:  N30.00 - Acute cystitis without hematuria


Disposition:  01 HOME, SELF-CARE


Condition:  Stable/Unchanged





Departure-Patient Inst.


Decision time for Depature:  13:48


Referrals:  


MOSES CHAIREZ MD (PCP/Family)


Primary Care Physician


Patient Instructions:  Palliative Care





Add. Discharge Instructions:  


Follow-up with [facility] care.


Scripts


Lorazepam (Lorazepam Intensol) 2 Mg/1 Ml Oral.conc


0.5 MG PO Q2H PRN for AGITATION, #30 ML


   Prov: ELEAZAR SALAZAR         1/7/19





Copy


Copies To 1:   MOSES CHAIREZ MD, TITUS J Jan 7, 2019 11:19

## 2019-01-07 NOTE — NUR
-------------------------------------------------------------------------------

           *** Note undone in [facility] - 01/07/19 at 1144 by [id number] ***           

-------------------------------------------------------------------------------

pharmacy called for med rec

## 2019-01-07 NOTE — NUR
Palliative Care RN called to the ED for this patient who came in unresponsive except for 
pain.  Family has opted for [facility] and would like to return to the [facility] previous placement with 
[facility] as choice hospice.  This RN has notified [facility] of return and [facility] 
of referral. I have sent cliical information to [facility] & [facility] as well.   I have also spoken with Dr. Hernández who is in agreement with Hospice at this time.  Dr. Sales to send script for 
Ativan SL, she is unable to take narcotics due to intolerance..according to the son she gets 
very nauseas and vomits. 



No needs currently.

## 2019-01-07 NOTE — NUR
Spoke with Magalie at [facility] about transportation to [facility].  Per Magalie, send pt via 
EMS.